# Patient Record
Sex: MALE | Race: OTHER | HISPANIC OR LATINO | Employment: PART TIME | URBAN - METROPOLITAN AREA
[De-identification: names, ages, dates, MRNs, and addresses within clinical notes are randomized per-mention and may not be internally consistent; named-entity substitution may affect disease eponyms.]

---

## 2020-11-01 ENCOUNTER — HOSPITAL ENCOUNTER (OUTPATIENT)
Facility: HOSPITAL | Age: 65
Setting detail: OBSERVATION
Discharge: HOME | End: 2020-11-02
Attending: EMERGENCY MEDICINE | Admitting: INTERNAL MEDICINE

## 2020-11-01 ENCOUNTER — APPOINTMENT (EMERGENCY)
Dept: RADIOLOGY | Facility: HOSPITAL | Age: 65
End: 2020-11-01

## 2020-11-01 DIAGNOSIS — R42 DIZZINESS: ICD-10-CM

## 2020-11-01 DIAGNOSIS — E78.5 HYPERLIPIDEMIA: ICD-10-CM

## 2020-11-01 DIAGNOSIS — I63.9 CVA (CEREBRAL VASCULAR ACCIDENT) (HCC): Primary | ICD-10-CM

## 2020-11-01 PROBLEM — H93.19 TINNITUS: Status: ACTIVE | Noted: 2020-11-01

## 2020-11-01 PROBLEM — J32.1 SINUSITIS CHRONIC, FRONTAL: Status: ACTIVE | Noted: 2020-11-01

## 2020-11-01 PROBLEM — G44.009 HEADACHES, CLUSTER: Status: ACTIVE | Noted: 2020-11-01

## 2020-11-01 LAB
ALBUMIN SERPL BCP-MCNC: 3.4 G/DL (ref 3.5–5)
ALP SERPL-CCNC: 93 U/L (ref 46–116)
ALT SERPL W P-5'-P-CCNC: 25 U/L (ref 12–78)
ANION GAP SERPL CALCULATED.3IONS-SCNC: 6 MMOL/L (ref 4–13)
APTT PPP: 30 SECONDS (ref 23–37)
AST SERPL W P-5'-P-CCNC: 14 U/L (ref 5–45)
BASOPHILS # BLD AUTO: 0.04 THOUSANDS/ΜL (ref 0–0.1)
BASOPHILS NFR BLD AUTO: 1 % (ref 0–1)
BILIRUB SERPL-MCNC: 0.4 MG/DL (ref 0.2–1)
BUN SERPL-MCNC: 19 MG/DL (ref 5–25)
CALCIUM ALBUM COR SERPL-MCNC: 9.2 MG/DL (ref 8.3–10.1)
CALCIUM SERPL-MCNC: 8.7 MG/DL (ref 8.3–10.1)
CHLORIDE SERPL-SCNC: 103 MMOL/L (ref 100–108)
CO2 SERPL-SCNC: 30 MMOL/L (ref 21–32)
CREAT SERPL-MCNC: 1.27 MG/DL (ref 0.6–1.3)
EOSINOPHIL # BLD AUTO: 0.13 THOUSAND/ΜL (ref 0–0.61)
EOSINOPHIL NFR BLD AUTO: 2 % (ref 0–6)
ERYTHROCYTE [DISTWIDTH] IN BLOOD BY AUTOMATED COUNT: 12.5 % (ref 11.6–15.1)
GFR SERPL CREATININE-BSD FRML MDRD: 59 ML/MIN/1.73SQ M
GLUCOSE SERPL-MCNC: 89 MG/DL (ref 65–140)
HCT VFR BLD AUTO: 43.4 % (ref 36.5–49.3)
HGB BLD-MCNC: 14.1 G/DL (ref 12–17)
IMM GRANULOCYTES # BLD AUTO: 0.11 THOUSAND/UL (ref 0–0.2)
IMM GRANULOCYTES NFR BLD AUTO: 2 % (ref 0–2)
INR PPP: 1.1 (ref 0.84–1.19)
LYMPHOCYTES # BLD AUTO: 1.08 THOUSANDS/ΜL (ref 0.6–4.47)
LYMPHOCYTES NFR BLD AUTO: 16 % (ref 14–44)
MCH RBC QN AUTO: 29.4 PG (ref 26.8–34.3)
MCHC RBC AUTO-ENTMCNC: 32.5 G/DL (ref 31.4–37.4)
MCV RBC AUTO: 91 FL (ref 82–98)
MONOCYTES # BLD AUTO: 0.53 THOUSAND/ΜL (ref 0.17–1.22)
MONOCYTES NFR BLD AUTO: 8 % (ref 4–12)
NEUTROPHILS # BLD AUTO: 4.75 THOUSANDS/ΜL (ref 1.85–7.62)
NEUTS SEG NFR BLD AUTO: 71 % (ref 43–75)
NRBC BLD AUTO-RTO: 0 /100 WBCS
NT-PROBNP SERPL-MCNC: 28 PG/ML
PLATELET # BLD AUTO: 204 THOUSANDS/UL (ref 149–390)
PMV BLD AUTO: 9.2 FL (ref 8.9–12.7)
POTASSIUM SERPL-SCNC: 4.2 MMOL/L (ref 3.5–5.3)
PROT SERPL-MCNC: 6.8 G/DL (ref 6.4–8.2)
PROTHROMBIN TIME: 14.1 SECONDS (ref 11.6–14.5)
RBC # BLD AUTO: 4.79 MILLION/UL (ref 3.88–5.62)
SODIUM SERPL-SCNC: 139 MMOL/L (ref 136–145)
TROPONIN I SERPL-MCNC: <0.02 NG/ML
TSH SERPL DL<=0.05 MIU/L-ACNC: 1.69 UIU/ML (ref 0.36–3.74)
WBC # BLD AUTO: 6.64 THOUSAND/UL (ref 4.31–10.16)

## 2020-11-01 PROCEDURE — 84484 ASSAY OF TROPONIN QUANT: CPT | Performed by: PHYSICIAN ASSISTANT

## 2020-11-01 PROCEDURE — 70496 CT ANGIOGRAPHY HEAD: CPT

## 2020-11-01 PROCEDURE — 85025 COMPLETE CBC W/AUTO DIFF WBC: CPT | Performed by: PHYSICIAN ASSISTANT

## 2020-11-01 PROCEDURE — 85730 THROMBOPLASTIN TIME PARTIAL: CPT | Performed by: PHYSICIAN ASSISTANT

## 2020-11-01 PROCEDURE — 84443 ASSAY THYROID STIM HORMONE: CPT | Performed by: PHYSICIAN ASSISTANT

## 2020-11-01 PROCEDURE — 80053 COMPREHEN METABOLIC PANEL: CPT | Performed by: PHYSICIAN ASSISTANT

## 2020-11-01 PROCEDURE — 71045 X-RAY EXAM CHEST 1 VIEW: CPT

## 2020-11-01 PROCEDURE — 85610 PROTHROMBIN TIME: CPT | Performed by: PHYSICIAN ASSISTANT

## 2020-11-01 PROCEDURE — 70498 CT ANGIOGRAPHY NECK: CPT

## 2020-11-01 PROCEDURE — 99220 PR INITIAL OBSERVATION CARE/DAY 70 MINUTES: CPT | Performed by: INTERNAL MEDICINE

## 2020-11-01 PROCEDURE — 99285 EMERGENCY DEPT VISIT HI MDM: CPT

## 2020-11-01 PROCEDURE — 99285 EMERGENCY DEPT VISIT HI MDM: CPT | Performed by: PHYSICIAN ASSISTANT

## 2020-11-01 PROCEDURE — 93005 ELECTROCARDIOGRAM TRACING: CPT

## 2020-11-01 PROCEDURE — 83880 ASSAY OF NATRIURETIC PEPTIDE: CPT | Performed by: PHYSICIAN ASSISTANT

## 2020-11-01 PROCEDURE — 36415 COLL VENOUS BLD VENIPUNCTURE: CPT | Performed by: PHYSICIAN ASSISTANT

## 2020-11-01 PROCEDURE — G1004 CDSM NDSC: HCPCS

## 2020-11-01 RX ORDER — ATORVASTATIN CALCIUM 40 MG/1
40 TABLET, FILM COATED ORAL EVERY EVENING
Status: DISCONTINUED | OUTPATIENT
Start: 2020-11-01 | End: 2020-11-02 | Stop reason: HOSPADM

## 2020-11-01 RX ORDER — ASPIRIN 81 MG/1
81 TABLET, CHEWABLE ORAL DAILY
Status: DISCONTINUED | OUTPATIENT
Start: 2020-11-01 | End: 2020-11-02 | Stop reason: HOSPADM

## 2020-11-01 RX ORDER — MECLIZINE HYDROCHLORIDE 25 MG/1
25 TABLET ORAL ONCE
Status: COMPLETED | OUTPATIENT
Start: 2020-11-01 | End: 2020-11-01

## 2020-11-01 RX ORDER — LORATADINE 10 MG/1
10 TABLET ORAL DAILY
Status: DISCONTINUED | OUTPATIENT
Start: 2020-11-01 | End: 2020-11-02 | Stop reason: HOSPADM

## 2020-11-01 RX ORDER — HEPARIN SODIUM 5000 [USP'U]/ML
5000 INJECTION, SOLUTION INTRAVENOUS; SUBCUTANEOUS EVERY 8 HOURS SCHEDULED
Status: DISCONTINUED | OUTPATIENT
Start: 2020-11-01 | End: 2020-11-02 | Stop reason: HOSPADM

## 2020-11-01 RX ORDER — FLUTICASONE PROPIONATE 50 MCG
1 SPRAY, SUSPENSION (ML) NASAL AS NEEDED
COMMUNITY

## 2020-11-01 RX ORDER — FLUTICASONE PROPIONATE 50 MCG
1 SPRAY, SUSPENSION (ML) NASAL DAILY
Status: DISCONTINUED | OUTPATIENT
Start: 2020-11-01 | End: 2020-11-02 | Stop reason: HOSPADM

## 2020-11-01 RX ORDER — ASPIRIN 81 MG/1
162 TABLET, CHEWABLE ORAL ONCE
Status: COMPLETED | OUTPATIENT
Start: 2020-11-01 | End: 2020-11-01

## 2020-11-01 RX ORDER — LORATADINE 10 MG/1
10 TABLET ORAL DAILY PRN
COMMUNITY

## 2020-11-01 RX ADMIN — PREDNISONE 50 MG: 20 TABLET ORAL at 10:55

## 2020-11-01 RX ADMIN — MECLIZINE HYDROCHLORIDE 25 MG: 25 TABLET ORAL at 10:54

## 2020-11-01 RX ADMIN — HEPARIN SODIUM 5000 UNITS: 5000 INJECTION INTRAVENOUS; SUBCUTANEOUS at 21:05

## 2020-11-01 RX ADMIN — ASPIRIN 81 MG 81 MG: 81 TABLET ORAL at 15:18

## 2020-11-01 RX ADMIN — IOHEXOL 85 ML: 350 INJECTION, SOLUTION INTRAVENOUS at 11:34

## 2020-11-01 RX ADMIN — HEPARIN SODIUM 5000 UNITS: 5000 INJECTION INTRAVENOUS; SUBCUTANEOUS at 15:17

## 2020-11-01 RX ADMIN — ATORVASTATIN CALCIUM 40 MG: 40 TABLET, FILM COATED ORAL at 17:29

## 2020-11-01 RX ADMIN — ASPIRIN 81 MG CHEWABLE TABLET 162 MG: 81 TABLET CHEWABLE at 12:22

## 2020-11-02 ENCOUNTER — APPOINTMENT (OUTPATIENT)
Dept: RADIOLOGY | Facility: HOSPITAL | Age: 65
End: 2020-11-02

## 2020-11-02 ENCOUNTER — APPOINTMENT (OUTPATIENT)
Dept: NON INVASIVE DIAGNOSTICS | Facility: HOSPITAL | Age: 65
End: 2020-11-02

## 2020-11-02 VITALS
DIASTOLIC BLOOD PRESSURE: 74 MMHG | WEIGHT: 196.87 LBS | HEART RATE: 63 BPM | BODY MASS INDEX: 27.56 KG/M2 | HEIGHT: 71 IN | SYSTOLIC BLOOD PRESSURE: 126 MMHG | TEMPERATURE: 98.3 F | OXYGEN SATURATION: 100 % | RESPIRATION RATE: 17 BRPM

## 2020-11-02 LAB
ALBUMIN SERPL BCP-MCNC: 3.1 G/DL (ref 3.5–5)
ALP SERPL-CCNC: 86 U/L (ref 46–116)
ALT SERPL W P-5'-P-CCNC: 20 U/L (ref 12–78)
ANION GAP SERPL CALCULATED.3IONS-SCNC: 8 MMOL/L (ref 4–13)
AST SERPL W P-5'-P-CCNC: 13 U/L (ref 5–45)
ATRIAL RATE: 61 BPM
BILIRUB SERPL-MCNC: 0.3 MG/DL (ref 0.2–1)
BUN SERPL-MCNC: 15 MG/DL (ref 5–25)
CALCIUM ALBUM COR SERPL-MCNC: 9.3 MG/DL (ref 8.3–10.1)
CALCIUM SERPL-MCNC: 8.6 MG/DL (ref 8.3–10.1)
CHLORIDE SERPL-SCNC: 105 MMOL/L (ref 100–108)
CHOLEST SERPL-MCNC: 196 MG/DL (ref 50–200)
CO2 SERPL-SCNC: 28 MMOL/L (ref 21–32)
CREAT SERPL-MCNC: 1.2 MG/DL (ref 0.6–1.3)
ERYTHROCYTE [DISTWIDTH] IN BLOOD BY AUTOMATED COUNT: 12.5 % (ref 11.6–15.1)
EST. AVERAGE GLUCOSE BLD GHB EST-MCNC: 114 MG/DL
GFR SERPL CREATININE-BSD FRML MDRD: 64 ML/MIN/1.73SQ M
GLUCOSE P FAST SERPL-MCNC: 101 MG/DL (ref 65–99)
GLUCOSE SERPL-MCNC: 101 MG/DL (ref 65–140)
HBA1C MFR BLD: 5.6 %
HCT VFR BLD AUTO: 43.1 % (ref 36.5–49.3)
HDLC SERPL-MCNC: 67 MG/DL
HGB BLD-MCNC: 13.8 G/DL (ref 12–17)
LDLC SERPL CALC-MCNC: 112 MG/DL (ref 0–100)
MCH RBC QN AUTO: 29.1 PG (ref 26.8–34.3)
MCHC RBC AUTO-ENTMCNC: 32 G/DL (ref 31.4–37.4)
MCV RBC AUTO: 91 FL (ref 82–98)
P AXIS: 53 DEGREES
PLATELET # BLD AUTO: 193 THOUSANDS/UL (ref 149–390)
PMV BLD AUTO: 9.8 FL (ref 8.9–12.7)
POTASSIUM SERPL-SCNC: 3.7 MMOL/L (ref 3.5–5.3)
PR INTERVAL: 164 MS
PROT SERPL-MCNC: 6.6 G/DL (ref 6.4–8.2)
QRS AXIS: 76 DEGREES
QRSD INTERVAL: 84 MS
QT INTERVAL: 376 MS
QTC INTERVAL: 378 MS
RBC # BLD AUTO: 4.75 MILLION/UL (ref 3.88–5.62)
SODIUM SERPL-SCNC: 141 MMOL/L (ref 136–145)
T WAVE AXIS: 42 DEGREES
TRIGL SERPL-MCNC: 87 MG/DL
VENTRICULAR RATE: 61 BPM
WBC # BLD AUTO: 9.14 THOUSAND/UL (ref 4.31–10.16)

## 2020-11-02 PROCEDURE — 85027 COMPLETE CBC AUTOMATED: CPT | Performed by: INTERNAL MEDICINE

## 2020-11-02 PROCEDURE — 93306 TTE W/DOPPLER COMPLETE: CPT

## 2020-11-02 PROCEDURE — 83036 HEMOGLOBIN GLYCOSYLATED A1C: CPT | Performed by: INTERNAL MEDICINE

## 2020-11-02 PROCEDURE — 70551 MRI BRAIN STEM W/O DYE: CPT

## 2020-11-02 PROCEDURE — 80061 LIPID PANEL: CPT | Performed by: INTERNAL MEDICINE

## 2020-11-02 PROCEDURE — 93306 TTE W/DOPPLER COMPLETE: CPT | Performed by: INTERNAL MEDICINE

## 2020-11-02 PROCEDURE — 97161 PT EVAL LOW COMPLEX 20 MIN: CPT

## 2020-11-02 PROCEDURE — 80053 COMPREHEN METABOLIC PANEL: CPT | Performed by: INTERNAL MEDICINE

## 2020-11-02 PROCEDURE — 99217 PR OBSERVATION CARE DISCHARGE MANAGEMENT: CPT | Performed by: NURSE PRACTITIONER

## 2020-11-02 PROCEDURE — 92523 SPEECH SOUND LANG COMPREHEN: CPT

## 2020-11-02 PROCEDURE — 99244 OFF/OP CNSLTJ NEW/EST MOD 40: CPT | Performed by: NURSE PRACTITIONER

## 2020-11-02 PROCEDURE — 97165 OT EVAL LOW COMPLEX 30 MIN: CPT

## 2020-11-02 PROCEDURE — 93010 ELECTROCARDIOGRAM REPORT: CPT | Performed by: INTERNAL MEDICINE

## 2020-11-02 PROCEDURE — G1004 CDSM NDSC: HCPCS

## 2020-11-02 RX ORDER — ATORVASTATIN CALCIUM 20 MG/1
20 TABLET, FILM COATED ORAL
Qty: 30 TABLET | Refills: 0 | Status: SHIPPED | OUTPATIENT
Start: 2020-11-02 | End: 2021-06-23

## 2020-11-02 RX ORDER — ATORVASTATIN CALCIUM 40 MG/1
20 TABLET, FILM COATED ORAL
Qty: 15 TABLET | Refills: 0 | Status: SHIPPED | OUTPATIENT
Start: 2020-11-02 | End: 2020-11-02 | Stop reason: HOSPADM

## 2020-11-02 RX ADMIN — ATORVASTATIN CALCIUM 40 MG: 40 TABLET, FILM COATED ORAL at 17:56

## 2020-11-02 RX ADMIN — HEPARIN SODIUM 5000 UNITS: 5000 INJECTION INTRAVENOUS; SUBCUTANEOUS at 15:11

## 2020-11-02 RX ADMIN — HEPARIN SODIUM 5000 UNITS: 5000 INJECTION INTRAVENOUS; SUBCUTANEOUS at 05:28

## 2020-11-02 RX ADMIN — ASPIRIN 81 MG 81 MG: 81 TABLET ORAL at 08:08

## 2020-11-02 RX ADMIN — LORATADINE 10 MG: 10 TABLET ORAL at 08:08

## 2020-11-02 RX ADMIN — FLUTICASONE PROPIONATE 1 SPRAY: 50 SPRAY, METERED NASAL at 08:08

## 2020-11-06 ENCOUNTER — TELEPHONE (OUTPATIENT)
Dept: NEUROLOGY | Facility: CLINIC | Age: 65
End: 2020-11-06

## 2021-03-15 DIAGNOSIS — K21.00 GASTROESOPHAGEAL REFLUX DISEASE WITH ESOPHAGITIS WITHOUT HEMORRHAGE: Primary | ICD-10-CM

## 2021-03-17 RX ORDER — OMEPRAZOLE 40 MG/1
CAPSULE, DELAYED RELEASE ORAL
Qty: 30 CAPSULE | Refills: 2 | Status: SHIPPED | OUTPATIENT
Start: 2021-03-17 | End: 2021-09-24

## 2021-03-18 NOTE — TELEPHONE ENCOUNTER
I lmom for pt to please call back to schedule an appt with Dr Schroeder Clarity  Will call pt again in one week if do not hear back from him

## 2021-03-26 NOTE — TELEPHONE ENCOUNTER
I lmom for pt to please call back to schedule an appt with Dr Susan Lockhart for a f/u to med refill  Will call pt again in two weeks if do not hear back from him

## 2021-03-31 ENCOUNTER — OFFICE VISIT (OUTPATIENT)
Dept: GASTROENTEROLOGY | Facility: CLINIC | Age: 66
End: 2021-03-31
Payer: COMMERCIAL

## 2021-03-31 VITALS
SYSTOLIC BLOOD PRESSURE: 122 MMHG | HEIGHT: 71 IN | DIASTOLIC BLOOD PRESSURE: 82 MMHG | WEIGHT: 200 LBS | BODY MASS INDEX: 28 KG/M2 | HEART RATE: 89 BPM

## 2021-03-31 DIAGNOSIS — E66.3 OVERWEIGHT: ICD-10-CM

## 2021-03-31 DIAGNOSIS — Z86.010 HX OF ADENOMATOUS COLONIC POLYPS: ICD-10-CM

## 2021-03-31 DIAGNOSIS — K21.00 GASTROESOPHAGEAL REFLUX DISEASE WITH ESOPHAGITIS WITHOUT HEMORRHAGE: Primary | ICD-10-CM

## 2021-03-31 PROBLEM — I63.81 LACUNAR INFARCT, ACUTE (HCC): Status: ACTIVE | Noted: 2021-03-31

## 2021-03-31 PROBLEM — Z86.0101 HX OF ADENOMATOUS COLONIC POLYPS: Status: ACTIVE | Noted: 2021-03-31

## 2021-03-31 PROCEDURE — 99214 OFFICE O/P EST MOD 30 MIN: CPT | Performed by: INTERNAL MEDICINE

## 2021-03-31 NOTE — PROGRESS NOTES
[facility] - Outpatient Follow-up Note  David Willard 73 y o  male MRN: [id number]  Encounter: [id number]          ASSESSMENT AND PLAN:      1  Gastroesophageal reflux disease with esophagitis without hemorrhage    Intermittent episodes controlled with as needed omeprazole  He will work on conservative measures and weight loss  He has not had a EGD for over 5 years  - EGD; Future  - PAT Covid Screening; Future    2  Hx of adenomatous colonic polyps    Was due for repeat colonoscopy for tubular adenomas and tubulovillous adenomas in March of 2019 but lost his insurance he now presents to schedule colonoscopy  - Colonoscopy; Future    3  Overweight    Gained weight   After poor dietary choices  He is working on better control ie  portion control and carbohydrate avoidance     ______________________________________________________________________    SUBJECTIVE:   Very pleasant 81-year-old gentleman who we seen over 3 years ago for EGD and colonoscopy  He has a gastroesophageal reflux without Mercado's  He was found to have colon polyps both tubular adenomas and tubulovillous adenomas  He was due for repeat in 2019 but lost his insurance  He now presents  No significant upper lower GI symptoms at present  Occasionally has some bloating  His reflux she controls with intermittent use of a PPI  He has gained weight during COVID  REVIEW OF SYSTEMS IS OTHERWISE NEGATIVE  Historical Information   History reviewed  No pertinent past medical history  Past Surgical History:   Procedure Laterality Date    NO PAST SURGERIES      NC COLONOSCOPY FLX DX W/COLLJ SPEC WHEN PFRMD N/A 2/29/2016    Procedure: COLONOSCOPY;  Surgeon: Ish Philippe MD;  Location: [facility] GI LAB; Service: Gastroenterology    NC EGD TRANSORAL BIOPSY SINGLE/MULTIPLE N/A 2/29/2016    Procedure: ESOPHAGOGASTRODUODENOSCOPY (EGD); Surgeon: Ish Philippe MD;  Location: [facility] GI LAB;   Service: Gastroenterology  TONSILLECTOMY N/A     child     Social History   Social History     Substance and Sexual Activity   Alcohol Use Yes    Frequency: 4 or more times a week    Drinks per session: 3 or 4    Binge frequency: Never     Social History     Substance and Sexual Activity   Drug Use No     Social History     Tobacco Use   Smoking Status Former Smoker    Quit date: 2001    Years since quittin 0   Smokeless Tobacco Never Used     History reviewed  No pertinent family history  Meds/Allergies       Current Outpatient Medications:     fluticasone (FLONASE) 50 mcg/act nasal spray    loratadine (CLARITIN) 10 mg tablet    omeprazole (PriLOSEC) 40 MG capsule    atorvastatin (LIPITOR) 20 mg tablet    Allergies   Allergen Reactions    Penicillins Other (See Comments)     dizziness           Objective     Blood pressure 122/82, pulse 89, height 5' 11" (1 803 m), weight 90 7 kg (200 lb)  Body mass index is 27 89 kg/m²  PHYSICAL EXAM:      General Appearance:   Alert, cooperative, no distress   HEENT:   Normocephalic, atraumatic, anicteric      Neck:  Supple, symmetrical, trachea midline   Lungs:   Clear to auscultation bilaterally; no rales, rhonchi or wheezing; respirations unlabored    Heart[de-identified]   Regular rate and rhythm; no murmur, rub, or gallop  Abdomen:   Soft, non-tender, non-distended; normal bowel sounds; no masses, no organomegaly    Genitalia:   Deferred    Rectal:   Deferred    Extremities:  No cyanosis, clubbing or edema    Pulses:  2+ and symmetric    Skin:  No jaundice, rashes, or lesions    Lymph nodes:  No palpable cervical lymphadenopathy        Lab Results:   No visits with results within 1 Day(s) from this visit     Latest known visit with results is:   Admission on 2020, Discharged on 2020   Component Date Value    WBC 2020 6 64     RBC 2020 4 79     Hemoglobin 2020 14 1     Hematocrit 2020 43 4     MCV 2020 91     MCH 2020 29 4     MCHC 11/01/2020 32 5     RDW 11/01/2020 12 5     MPV 11/01/2020 9 2     Platelets 86/33/8020 204     nRBC 11/01/2020 0     Neutrophils Relative 11/01/2020 71     Immat GRANS % 11/01/2020 2     Lymphocytes Relative 11/01/2020 16     Monocytes Relative 11/01/2020 8     Eosinophils Relative 11/01/2020 2     Basophils Relative 11/01/2020 1     Neutrophils Absolute 11/01/2020 4 75     Immature Grans Absolute 11/01/2020 0 11     Lymphocytes Absolute 11/01/2020 1 08     Monocytes Absolute 11/01/2020 0 53     Eosinophils Absolute 11/01/2020 0 13     Basophils Absolute 11/01/2020 0 04     Protime 11/01/2020 14 1     INR 11/01/2020 1 10     PTT 11/01/2020 30     Sodium 11/01/2020 139     Potassium 11/01/2020 4 2     Chloride 11/01/2020 103     CO2 11/01/2020 30     ANION GAP 11/01/2020 6     BUN 11/01/2020 19     Creatinine 11/01/2020 1 27     Glucose 11/01/2020 89     Calcium 11/01/2020 8 7     Corrected Calcium 11/01/2020 9 2     AST 11/01/2020 14     ALT 11/01/2020 25     Alkaline Phosphatase 11/01/2020 93     Total Protein 11/01/2020 6 8     Albumin 11/01/2020 3 4*    Total Bilirubin 11/01/2020 0 40     eGFR 11/01/2020 59     Troponin I 11/01/2020 <0 02     NT-proBNP 11/01/2020 28     TSH 3RD GENERATON 11/01/2020 1 691     Cholesterol 11/02/2020 196     Triglycerides 11/02/2020 87     HDL, Direct 11/02/2020 67     LDL Calculated 11/02/2020 112*    Hemoglobin A1C 11/02/2020 5 6     EAG 11/02/2020 114     Sodium 11/02/2020 141     Potassium 11/02/2020 3 7     Chloride 11/02/2020 105     CO2 11/02/2020 28     ANION GAP 11/02/2020 8     BUN 11/02/2020 15     Creatinine 11/02/2020 1 20     Glucose 11/02/2020 101     Glucose, Fasting 11/02/2020 101*    Calcium 11/02/2020 8 6     Corrected Calcium 11/02/2020 9 3     AST 11/02/2020 13     ALT 11/02/2020 20     Alkaline Phosphatase 11/02/2020 86     Total Protein 11/02/2020 6 6     Albumin 11/02/2020 3 1*    Total Bilirubin 11/02/2020 0 30     eGFR 11/02/2020 64     WBC 11/02/2020 9 14     RBC 11/02/2020 4 75     Hemoglobin 11/02/2020 13 8     Hematocrit 11/02/2020 43 1     MCV 11/02/2020 91     MCH 11/02/2020 29 1     MCHC 11/02/2020 32 0     RDW 11/02/2020 12 5     Platelets 67/64/5974 193     MPV 11/02/2020 9 8     Ventricular Rate 11/01/2020 61     Atrial Rate 11/01/2020 61     OR Interval 11/01/2020 164     QRSD Interval 11/01/2020 84     QT Interval 11/01/2020 376     QTC Interval 11/01/2020 378     P Axis 11/01/2020 53     QRS Axis 11/01/2020 76     T Wave Axis 11/01/2020 42          Radiology Results:   No results found

## 2021-03-31 NOTE — H&P (VIEW-ONLY)
[facility] - Outpatient Follow-up Note  Dione Nayak 73 y o  male MRN: [id number]  Encounter: [id number]          ASSESSMENT AND PLAN:      1  Gastroesophageal reflux disease with esophagitis without hemorrhage    Intermittent episodes controlled with as needed omeprazole  He will work on conservative measures and weight loss  He has not had a EGD for over 5 years  - EGD; Future  - PAT Covid Screening; Future    2  Hx of adenomatous colonic polyps    Was due for repeat colonoscopy for tubular adenomas and tubulovillous adenomas in March of 2019 but lost his insurance he now presents to schedule colonoscopy  - Colonoscopy; Future    3  Overweight    Gained weight   After poor dietary choices  He is working on better control ie  portion control and carbohydrate avoidance     ______________________________________________________________________    SUBJECTIVE:   Very pleasant 52-year-old gentleman who we seen over 3 years ago for EGD and colonoscopy  He has a gastroesophageal reflux without Mercado's  He was found to have colon polyps both tubular adenomas and tubulovillous adenomas  He was due for repeat in 2019 but lost his insurance  He now presents  No significant upper lower GI symptoms at present  Occasionally has some bloating  His reflux she controls with intermittent use of a PPI  He has gained weight during COVID  REVIEW OF SYSTEMS IS OTHERWISE NEGATIVE  Historical Information   History reviewed  No pertinent past medical history  Past Surgical History:   Procedure Laterality Date    NO PAST SURGERIES      VT COLONOSCOPY FLX DX W/COLLJ SPEC WHEN PFRMD N/A 2/29/2016    Procedure: COLONOSCOPY;  Surgeon: Berto Cho MD;  Location: [facility] GI LAB; Service: Gastroenterology    VT EGD TRANSORAL BIOPSY SINGLE/MULTIPLE N/A 2/29/2016    Procedure: ESOPHAGOGASTRODUODENOSCOPY (EGD); Surgeon: Berto Cho MD;  Location: [facility] GI LAB;   Service: Gastroenterology  TONSILLECTOMY N/A     child     Social History   Social History     Substance and Sexual Activity   Alcohol Use Yes    Frequency: 4 or more times a week    Drinks per session: 3 or 4    Binge frequency: Never     Social History     Substance and Sexual Activity   Drug Use No     Social History     Tobacco Use   Smoking Status Former Smoker    Quit date: 2001    Years since quittin 0   Smokeless Tobacco Never Used     History reviewed  No pertinent family history  Meds/Allergies       Current Outpatient Medications:     fluticasone (FLONASE) 50 mcg/act nasal spray    loratadine (CLARITIN) 10 mg tablet    omeprazole (PriLOSEC) 40 MG capsule    atorvastatin (LIPITOR) 20 mg tablet    Allergies   Allergen Reactions    Penicillins Other (See Comments)     dizziness           Objective     Blood pressure 122/82, pulse 89, height 5' 11" (1 803 m), weight 90 7 kg (200 lb)  Body mass index is 27 89 kg/m²  PHYSICAL EXAM:      General Appearance:   Alert, cooperative, no distress   HEENT:   Normocephalic, atraumatic, anicteric      Neck:  Supple, symmetrical, trachea midline   Lungs:   Clear to auscultation bilaterally; no rales, rhonchi or wheezing; respirations unlabored    Heart[de-identified]   Regular rate and rhythm; no murmur, rub, or gallop  Abdomen:   Soft, non-tender, non-distended; normal bowel sounds; no masses, no organomegaly    Genitalia:   Deferred    Rectal:   Deferred    Extremities:  No cyanosis, clubbing or edema    Pulses:  2+ and symmetric    Skin:  No jaundice, rashes, or lesions    Lymph nodes:  No palpable cervical lymphadenopathy        Lab Results:   No visits with results within 1 Day(s) from this visit     Latest known visit with results is:   Admission on 2020, Discharged on 2020   Component Date Value    WBC 2020 6 64     RBC 2020 4 79     Hemoglobin 2020 14 1     Hematocrit 2020 43 4     MCV 2020 91     MCH 2020 29 4     MCHC 11/01/2020 32 5     RDW 11/01/2020 12 5     MPV 11/01/2020 9 2     Platelets 46/38/3255 204     nRBC 11/01/2020 0     Neutrophils Relative 11/01/2020 71     Immat GRANS % 11/01/2020 2     Lymphocytes Relative 11/01/2020 16     Monocytes Relative 11/01/2020 8     Eosinophils Relative 11/01/2020 2     Basophils Relative 11/01/2020 1     Neutrophils Absolute 11/01/2020 4 75     Immature Grans Absolute 11/01/2020 0 11     Lymphocytes Absolute 11/01/2020 1 08     Monocytes Absolute 11/01/2020 0 53     Eosinophils Absolute 11/01/2020 0 13     Basophils Absolute 11/01/2020 0 04     Protime 11/01/2020 14 1     INR 11/01/2020 1 10     PTT 11/01/2020 30     Sodium 11/01/2020 139     Potassium 11/01/2020 4 2     Chloride 11/01/2020 103     CO2 11/01/2020 30     ANION GAP 11/01/2020 6     BUN 11/01/2020 19     Creatinine 11/01/2020 1 27     Glucose 11/01/2020 89     Calcium 11/01/2020 8 7     Corrected Calcium 11/01/2020 9 2     AST 11/01/2020 14     ALT 11/01/2020 25     Alkaline Phosphatase 11/01/2020 93     Total Protein 11/01/2020 6 8     Albumin 11/01/2020 3 4*    Total Bilirubin 11/01/2020 0 40     eGFR 11/01/2020 59     Troponin I 11/01/2020 <0 02     NT-proBNP 11/01/2020 28     TSH 3RD GENERATON 11/01/2020 1 691     Cholesterol 11/02/2020 196     Triglycerides 11/02/2020 87     HDL, Direct 11/02/2020 67     LDL Calculated 11/02/2020 112*    Hemoglobin A1C 11/02/2020 5 6     EAG 11/02/2020 114     Sodium 11/02/2020 141     Potassium 11/02/2020 3 7     Chloride 11/02/2020 105     CO2 11/02/2020 28     ANION GAP 11/02/2020 8     BUN 11/02/2020 15     Creatinine 11/02/2020 1 20     Glucose 11/02/2020 101     Glucose, Fasting 11/02/2020 101*    Calcium 11/02/2020 8 6     Corrected Calcium 11/02/2020 9 3     AST 11/02/2020 13     ALT 11/02/2020 20     Alkaline Phosphatase 11/02/2020 86     Total Protein 11/02/2020 6 6     Albumin 11/02/2020 3 1*    Total Bilirubin 11/02/2020 0 30     eGFR 11/02/2020 64     WBC 11/02/2020 9 14     RBC 11/02/2020 4 75     Hemoglobin 11/02/2020 13 8     Hematocrit 11/02/2020 43 1     MCV 11/02/2020 91     MCH 11/02/2020 29 1     MCHC 11/02/2020 32 0     RDW 11/02/2020 12 5     Platelets 47/62/0976 193     MPV 11/02/2020 9 8     Ventricular Rate 11/01/2020 61     Atrial Rate 11/01/2020 61     GA Interval 11/01/2020 164     QRSD Interval 11/01/2020 84     QT Interval 11/01/2020 376     QTC Interval 11/01/2020 378     P Axis 11/01/2020 53     QRS Axis 11/01/2020 76     T Wave Axis 11/01/2020 42          Radiology Results:   No results found

## 2021-04-05 ENCOUNTER — TRANSCRIBE ORDERS (OUTPATIENT)
Dept: ADMINISTRATIVE | Facility: HOSPITAL | Age: 66
End: 2021-04-05

## 2021-04-05 DIAGNOSIS — I70.213 ATHEROSCLEROSIS OF NATIVE ARTERIES OF EXTREMITIES WITH INTERMITTENT CLAUDICATION, BILATERAL LEGS (HCC): Primary | ICD-10-CM

## 2021-04-15 DIAGNOSIS — K21.00 GASTROESOPHAGEAL REFLUX DISEASE WITH ESOPHAGITIS WITHOUT HEMORRHAGE: ICD-10-CM

## 2021-04-15 PROCEDURE — U0005 INFEC AGEN DETEC AMPLI PROBE: HCPCS | Performed by: INTERNAL MEDICINE

## 2021-04-15 PROCEDURE — U0003 INFECTIOUS AGENT DETECTION BY NUCLEIC ACID (DNA OR RNA); SEVERE ACUTE RESPIRATORY SYNDROME CORONAVIRUS 2 (SARS-COV-2) (CORONAVIRUS DISEASE [COVID-19]), AMPLIFIED PROBE TECHNIQUE, MAKING USE OF HIGH THROUGHPUT TECHNOLOGIES AS DESCRIBED BY CMS-2020-01-R: HCPCS | Performed by: INTERNAL MEDICINE

## 2021-04-16 ENCOUNTER — HOSPITAL ENCOUNTER (OUTPATIENT)
Dept: RADIOLOGY | Facility: HOSPITAL | Age: 66
Discharge: HOME | End: 2021-04-16
Attending: INTERNAL MEDICINE
Payer: COMMERCIAL

## 2021-04-16 DIAGNOSIS — I70.213 ATHEROSCLEROSIS OF NATIVE ARTERIES OF EXTREMITIES WITH INTERMITTENT CLAUDICATION, BILATERAL LEGS (HCC): ICD-10-CM

## 2021-04-16 LAB — SARS-COV-2 RNA RESP QL NAA+PROBE: NEGATIVE

## 2021-04-16 PROCEDURE — 93923 UPR/LXTR ART STDY 3+ LVLS: CPT

## 2021-04-16 PROCEDURE — 93922 UPR/L XTREMITY ART 2 LEVELS: CPT | Performed by: SURGERY

## 2021-04-16 PROCEDURE — 93925 LOWER EXTREMITY STUDY: CPT

## 2021-04-16 PROCEDURE — 93925 LOWER EXTREMITY STUDY: CPT | Performed by: SURGERY

## 2021-04-19 NOTE — PRE-PROCEDURE INSTRUCTIONS
Pre-Surgery Instructions:   Medication Instructions    atorvastatin (LIPITOR) 20 mg tablet Patient was instructed by Physician and understands   fluticasone (FLONASE) 50 mcg/act nasal spray Patient was instructed by Physician and understands   loratadine (CLARITIN) 10 mg tablet Patient was instructed by Physician and understands   omeprazole (PriLOSEC) 40 MG capsule Patient was instructed by Physician and understands

## 2021-04-20 ENCOUNTER — ANESTHESIA EVENT (OUTPATIENT)
Dept: GASTROENTEROLOGY | Facility: AMBULARY SURGERY CENTER | Age: 66
End: 2021-04-20

## 2021-04-21 ENCOUNTER — ANESTHESIA (OUTPATIENT)
Dept: GASTROENTEROLOGY | Facility: AMBULARY SURGERY CENTER | Age: 66
End: 2021-04-21

## 2021-04-21 ENCOUNTER — HOSPITAL ENCOUNTER (OUTPATIENT)
Dept: GASTROENTEROLOGY | Facility: AMBULARY SURGERY CENTER | Age: 66
Setting detail: OUTPATIENT SURGERY
Discharge: HOME | End: 2021-04-21
Attending: INTERNAL MEDICINE
Payer: COMMERCIAL

## 2021-04-21 VITALS
SYSTOLIC BLOOD PRESSURE: 118 MMHG | WEIGHT: 200 LBS | HEIGHT: 71 IN | HEART RATE: 54 BPM | RESPIRATION RATE: 18 BRPM | DIASTOLIC BLOOD PRESSURE: 70 MMHG | BODY MASS INDEX: 28 KG/M2 | OXYGEN SATURATION: 96 % | TEMPERATURE: 95.3 F

## 2021-04-21 DIAGNOSIS — Z86.010 HX OF ADENOMATOUS COLONIC POLYPS: ICD-10-CM

## 2021-04-21 DIAGNOSIS — K21.00 GASTROESOPHAGEAL REFLUX DISEASE WITH ESOPHAGITIS WITHOUT HEMORRHAGE: ICD-10-CM

## 2021-04-21 PROCEDURE — 88305 TISSUE EXAM BY PATHOLOGIST: CPT | Performed by: PATHOLOGY

## 2021-04-21 PROCEDURE — 45385 COLONOSCOPY W/LESION REMOVAL: CPT | Performed by: INTERNAL MEDICINE

## 2021-04-21 PROCEDURE — 43239 EGD BIOPSY SINGLE/MULTIPLE: CPT | Performed by: INTERNAL MEDICINE

## 2021-04-21 PROCEDURE — 88313 SPECIAL STAINS GROUP 2: CPT | Performed by: PATHOLOGY

## 2021-04-21 RX ORDER — PROPOFOL 10 MG/ML
INJECTION, EMULSION INTRAVENOUS AS NEEDED
Status: DISCONTINUED | OUTPATIENT
Start: 2021-04-21 | End: 2021-04-21

## 2021-04-21 RX ORDER — SODIUM CHLORIDE, SODIUM LACTATE, POTASSIUM CHLORIDE, CALCIUM CHLORIDE 600; 310; 30; 20 MG/100ML; MG/100ML; MG/100ML; MG/100ML
125 INJECTION, SOLUTION INTRAVENOUS CONTINUOUS
Status: DISCONTINUED | OUTPATIENT
Start: 2021-04-21 | End: 2021-04-25 | Stop reason: HOSPADM

## 2021-04-21 RX ADMIN — PROPOFOL 50 MG: 10 INJECTION, EMULSION INTRAVENOUS at 11:14

## 2021-04-21 RX ADMIN — PROPOFOL 50 MG: 10 INJECTION, EMULSION INTRAVENOUS at 11:12

## 2021-04-21 RX ADMIN — PROPOFOL 20 MG: 10 INJECTION, EMULSION INTRAVENOUS at 11:31

## 2021-04-21 RX ADMIN — PROPOFOL 30 MG: 10 INJECTION, EMULSION INTRAVENOUS at 11:28

## 2021-04-21 RX ADMIN — PROPOFOL 50 MG: 10 INJECTION, EMULSION INTRAVENOUS at 11:18

## 2021-04-21 RX ADMIN — PROPOFOL 50 MG: 10 INJECTION, EMULSION INTRAVENOUS at 11:15

## 2021-04-21 RX ADMIN — PROPOFOL 50 MG: 10 INJECTION, EMULSION INTRAVENOUS at 11:06

## 2021-04-21 RX ADMIN — SODIUM CHLORIDE, SODIUM LACTATE, POTASSIUM CHLORIDE, AND CALCIUM CHLORIDE 125 ML/HR: .6; .31; .03; .02 INJECTION, SOLUTION INTRAVENOUS at 10:34

## 2021-04-21 RX ADMIN — PROPOFOL 100 MG: 10 INJECTION, EMULSION INTRAVENOUS at 10:55

## 2021-04-21 RX ADMIN — LIDOCAINE HYDROCHLORIDE 100 MG: 20 INJECTION, SOLUTION INTRAVENOUS at 10:55

## 2021-04-21 NOTE — INTERVAL H&P NOTE
H&P reviewed  After examining the patient I find no changes in the patients condition since the H&P had been written      Vitals:    04/21/21 1023   BP: 104/57   Pulse: 57   Resp: 16   Temp: (!) 95 3 °F (35 2 °C)   SpO2: 97%

## 2021-04-21 NOTE — ANESTHESIA PREPROCEDURE EVALUATION
Procedure:  EGD  COLONOSCOPY    Relevant Problems   GI/HEPATIC   (+) Gastroesophageal reflux disease with esophagitis without hemorrhage      NEURO/PSYCH   (+) Hx of adenomatous colonic polyps        Physical Exam    Airway    Mallampati score: II  TM Distance: >3 FB  Neck ROM: full     Dental   No notable dental hx     Cardiovascular  Rhythm: regular, Rate: normal, Cardiovascular exam normal    Pulmonary  Pulmonary exam normal Breath sounds clear to auscultation,     Other Findings        Anesthesia Plan  ASA Score- 2     Anesthesia Type- IV sedation with anesthesia with ASA Monitors  Additional Monitors:   Airway Plan:           Plan Factors-Exercise tolerance (METS): >4 METS  Chart reviewed  Existing labs reviewed  Patient summary reviewed  Patient is not a current smoker  Induction- intravenous  Postoperative Plan-     Informed Consent- Anesthetic plan and risks discussed with patient  I personally reviewed this patient with the CRNA  Discussed and agreed on the Anesthesia Plan with the CRNA  Sae Ponce

## 2021-04-21 NOTE — ANESTHESIA POSTPROCEDURE EVALUATION
Post-Op Assessment Note    CV Status:  Stable       Mental Status:  Sleepy   Hydration Status:  Stable   PONV Controlled:  Controlled   Airway Patency:  Patent      Post Op Vitals Reviewed: Yes      Staff: CRNA         No complications documented      BP   115/69   Temp     Pulse  54   Resp   20   SpO2   98

## 2021-04-30 NOTE — RESULT ENCOUNTER NOTE
Please inform patient that there biopsies were benign    Schedule EGD for 2-3 months to follow-up ulcer, repeat colonoscopy 3 years

## 2021-05-03 ENCOUNTER — TELEPHONE (OUTPATIENT)
Dept: GASTROENTEROLOGY | Facility: CLINIC | Age: 66
End: 2021-05-03

## 2021-05-03 NOTE — TELEPHONE ENCOUNTER
LMOM for pt to call to schedule EGD with Dr Cheryl Bowie in 2-3 months  Hx of ulcer  Will try calling again in 1 week  If he calls back please get him scheduled @ [facility] [place]  I can offer him June 30 or July 1

## 2021-05-03 NOTE — TELEPHONE ENCOUNTER
----- Message from Angela Chakraborty LPN sent at 3/5/4046 10:03 AM EDT -----  Pt informed of results and recommendations for repeat EGD 2-3 months and colon 3 years  Recalls entered  Please call pt to schedule his repeat EGD

## 2021-05-05 ENCOUNTER — IMMUNIZATIONS (OUTPATIENT)
Dept: FAMILY MEDICINE CLINIC | Facility: HOSPITAL | Age: 66
End: 2021-05-05

## 2021-05-05 DIAGNOSIS — Z23 ENCOUNTER FOR IMMUNIZATION: Primary | ICD-10-CM

## 2021-05-05 PROCEDURE — 0011A SARS-COV-2 / COVID-19 MRNA VACCINE (MODERNA) 100 MCG: CPT

## 2021-05-05 PROCEDURE — 91301 SARS-COV-2 / COVID-19 MRNA VACCINE (MODERNA) 100 MCG: CPT

## 2021-06-07 ENCOUNTER — IMMUNIZATIONS (OUTPATIENT)
Dept: FAMILY MEDICINE CLINIC | Facility: HOSPITAL | Age: 66
End: 2021-06-07

## 2021-06-07 DIAGNOSIS — Z23 ENCOUNTER FOR IMMUNIZATION: Primary | ICD-10-CM

## 2021-06-07 PROCEDURE — 0012A SARS-COV-2 / COVID-19 MRNA VACCINE (MODERNA) 100 MCG: CPT

## 2021-06-07 PROCEDURE — 91301 SARS-COV-2 / COVID-19 MRNA VACCINE (MODERNA) 100 MCG: CPT

## 2021-06-22 ENCOUNTER — TELEPHONE (OUTPATIENT)
Dept: PREADMISSION TESTING | Facility: HOSPITAL | Age: 66
End: 2021-06-22

## 2021-06-23 VITALS — WEIGHT: 200 LBS | HEIGHT: 71 IN | BODY MASS INDEX: 28 KG/M2

## 2021-06-23 PROCEDURE — U0005 INFEC AGEN DETEC AMPLI PROBE: HCPCS | Performed by: INTERNAL MEDICINE

## 2021-06-23 PROCEDURE — U0003 INFECTIOUS AGENT DETECTION BY NUCLEIC ACID (DNA OR RNA); SEVERE ACUTE RESPIRATORY SYNDROME CORONAVIRUS 2 (SARS-COV-2) (CORONAVIRUS DISEASE [COVID-19]), AMPLIFIED PROBE TECHNIQUE, MAKING USE OF HIGH THROUGHPUT TECHNOLOGIES AS DESCRIBED BY CMS-2020-01-R: HCPCS | Performed by: INTERNAL MEDICINE

## 2021-06-23 NOTE — PRE-PROCEDURE INSTRUCTIONS
Pre-Surgery Instructions:   Medication Instructions    fluticasone (FLONASE) 50 mcg/act nasal spray Pt may use if needed    loratadine (CLARITIN) 10 mg tablet Instructed patient per Anesthesia Guidelines   omeprazole (PriLOSEC) 40 MG capsule Instructed patient per Anesthesia Guidelines  Pt to follow Dr Ej Dong instructions    yamilex Hunt [phone]

## 2021-06-29 ENCOUNTER — TELEPHONE (OUTPATIENT)
Dept: GASTROENTEROLOGY | Facility: AMBULARY SURGERY CENTER | Age: 66
End: 2021-06-29

## 2021-06-30 ENCOUNTER — HOSPITAL ENCOUNTER (OUTPATIENT)
Dept: GASTROENTEROLOGY | Facility: AMBULARY SURGERY CENTER | Age: 66
Setting detail: OUTPATIENT SURGERY
Discharge: HOME | End: 2021-06-30
Attending: INTERNAL MEDICINE | Admitting: INTERNAL MEDICINE
Payer: COMMERCIAL

## 2021-06-30 ENCOUNTER — ANESTHESIA (OUTPATIENT)
Dept: GASTROENTEROLOGY | Facility: AMBULARY SURGERY CENTER | Age: 66
End: 2021-06-30

## 2021-06-30 ENCOUNTER — ANESTHESIA EVENT (OUTPATIENT)
Dept: GASTROENTEROLOGY | Facility: AMBULARY SURGERY CENTER | Age: 66
End: 2021-06-30

## 2021-06-30 VITALS
RESPIRATION RATE: 18 BRPM | OXYGEN SATURATION: 98 % | BODY MASS INDEX: 27.89 KG/M2 | DIASTOLIC BLOOD PRESSURE: 64 MMHG | HEART RATE: 52 BPM | SYSTOLIC BLOOD PRESSURE: 114 MMHG | TEMPERATURE: 97.4 F | HEIGHT: 71 IN

## 2021-06-30 DIAGNOSIS — K25.9 GASTRIC ULCER, UNSPECIFIED CHRONICITY, UNSPECIFIED WHETHER GASTRIC ULCER HEMORRHAGE OR PERFORATION PRESENT: ICD-10-CM

## 2021-06-30 PROCEDURE — 43239 EGD BIOPSY SINGLE/MULTIPLE: CPT | Performed by: INTERNAL MEDICINE

## 2021-06-30 PROCEDURE — 88313 SPECIAL STAINS GROUP 2: CPT | Performed by: PATHOLOGY

## 2021-06-30 PROCEDURE — 88305 TISSUE EXAM BY PATHOLOGIST: CPT | Performed by: PATHOLOGY

## 2021-06-30 RX ORDER — PROPOFOL 10 MG/ML
INJECTION, EMULSION INTRAVENOUS AS NEEDED
Status: DISCONTINUED | OUTPATIENT
Start: 2021-06-30 | End: 2021-06-30

## 2021-06-30 RX ORDER — SODIUM CHLORIDE, SODIUM LACTATE, POTASSIUM CHLORIDE, CALCIUM CHLORIDE 600; 310; 30; 20 MG/100ML; MG/100ML; MG/100ML; MG/100ML
INJECTION, SOLUTION INTRAVENOUS CONTINUOUS PRN
Status: DISCONTINUED | OUTPATIENT
Start: 2021-06-30 | End: 2021-06-30

## 2021-06-30 RX ORDER — SODIUM CHLORIDE, SODIUM LACTATE, POTASSIUM CHLORIDE, CALCIUM CHLORIDE 600; 310; 30; 20 MG/100ML; MG/100ML; MG/100ML; MG/100ML
125 INJECTION, SOLUTION INTRAVENOUS CONTINUOUS
Status: DISCONTINUED | OUTPATIENT
Start: 2021-06-30 | End: 2021-07-04 | Stop reason: HOSPADM

## 2021-06-30 RX ORDER — LIDOCAINE HYDROCHLORIDE 10 MG/ML
INJECTION, SOLUTION EPIDURAL; INFILTRATION; INTRACAUDAL; PERINEURAL AS NEEDED
Status: DISCONTINUED | OUTPATIENT
Start: 2021-06-30 | End: 2021-06-30

## 2021-06-30 RX ADMIN — SODIUM CHLORIDE, SODIUM LACTATE, POTASSIUM CHLORIDE, AND CALCIUM CHLORIDE 125 ML/HR: .6; .31; .03; .02 INJECTION, SOLUTION INTRAVENOUS at 09:28

## 2021-06-30 RX ADMIN — PROPOFOL 130 MG: 10 INJECTION, EMULSION INTRAVENOUS at 09:38

## 2021-06-30 RX ADMIN — PROPOFOL 50 MG: 10 INJECTION, EMULSION INTRAVENOUS at 09:43

## 2021-06-30 RX ADMIN — PROPOFOL 50 MG: 10 INJECTION, EMULSION INTRAVENOUS at 09:41

## 2021-06-30 RX ADMIN — PROPOFOL 20 MG: 10 INJECTION, EMULSION INTRAVENOUS at 09:39

## 2021-06-30 RX ADMIN — SODIUM CHLORIDE, SODIUM LACTATE, POTASSIUM CHLORIDE, AND CALCIUM CHLORIDE: .6; .31; .03; .02 INJECTION, SOLUTION INTRAVENOUS at 09:31

## 2021-06-30 RX ADMIN — LIDOCAINE HYDROCHLORIDE 50 MG: 10 INJECTION, SOLUTION EPIDURAL; INFILTRATION; INTRACAUDAL; PERINEURAL at 09:38

## 2021-06-30 RX ADMIN — PROPOFOL 50 MG: 10 INJECTION, EMULSION INTRAVENOUS at 09:45

## 2021-06-30 NOTE — H&P
History and Physical - [facility]  Virginie Nelson 73 y o  male MRN: [id number]                  HPI: Virginie Nelson is a 73y o  year old male who presents for upper endoscopy follow-up on esophageal ulcerations and antral ulceration      REVIEW OF SYSTEMS: Per the HPI, and otherwise unremarkable  Historical Information   Past Medical History:   Diagnosis Date    Cataract     Colon polyp     GERD (gastroesophageal reflux disease)     Hyperlipidemia     Seasonal allergies     Tinnitus     Wears glasses      Past Surgical History:   Procedure Laterality Date    COLONOSCOPY      FL COLONOSCOPY FLX DX W/COLLJ SPEC WHEN PFRMD N/A 2016    Procedure: COLONOSCOPY;  Surgeon: Nam Paulson MD;  Location: [facility] GI LAB; Service: Gastroenterology    FL EGD TRANSORAL BIOPSY SINGLE/MULTIPLE N/A 2016    Procedure: ESOPHAGOGASTRODUODENOSCOPY (EGD); Surgeon: Nma Paulson MD;  Location: [facility] GI LAB;   Service: Gastroenterology    TONSILLECTOMY N/A     child     Social History   Social History     Substance and Sexual Activity   Alcohol Use Yes    Comment: on occ     Social History     Substance and Sexual Activity   Drug Use No     Social History     Tobacco Use   Smoking Status Former Smoker    Quit date: 2001    Years since quittin 3   Smokeless Tobacco Never Used     Family History   Problem Relation Age of Onset    Arthritis Mother     Alzheimer's disease Mother     Other Mother         smoker-phlegm    Alcohol abuse Father     Suicidality Father        Meds/Allergies       Current Outpatient Medications:     fluticasone (FLONASE) 50 mcg/act nasal spray    loratadine (CLARITIN) 10 mg tablet    omeprazole (PriLOSEC) 40 MG capsule    Current Facility-Administered Medications:     lactated ringers infusion, 125 mL/hr, Intravenous, Continuous    Allergies   Allergen Reactions    Penicillins Other (See Comments)     dizziness       Objective     There were no vitals taken for this visit  PHYSICAL EXAM    Gen: NAD  Head: NCAT  CV: RRR  CHEST: Clear  ABD: soft, NT/ND  EXT: no edema      ASSESSMENT/PLAN:  This is a 73y o  year old male here for upper endoscopy, and he is stable and optimized for his procedure

## 2021-06-30 NOTE — ANESTHESIA PREPROCEDURE EVALUATION
Procedure:  EGD    Relevant Problems   GI/HEPATIC   (+) Gastroesophageal reflux disease with esophagitis without hemorrhage      NEURO/PSYCH   (+) Hx of adenomatous colonic polyps        Physical Exam    Airway    Mallampati score: II  TM Distance: >3 FB  Neck ROM: full     Dental   No notable dental hx     Cardiovascular  Cardiovascular exam normal    Pulmonary  Pulmonary exam normal     Other Findings        Anesthesia Plan  ASA Score- 2     Anesthesia Type- IV sedation with anesthesia with ASA Monitors  Additional Monitors:   Airway Plan:           Plan Factors-Exercise tolerance (METS): >4 METS  Chart reviewed  Imaging results reviewed  Existing labs reviewed  Patient summary reviewed  Patient is not a current smoker  Induction-     Postoperative Plan-     Informed Consent- Anesthetic plan and risks discussed with patient  I personally reviewed this patient with the CRNA  Discussed and agreed on the Anesthesia Plan with the CRNA  Lucia Sampson

## 2021-06-30 NOTE — ANESTHESIA POSTPROCEDURE EVALUATION
Post-Op Assessment Note    CV Status:  Stable  Pain Score: 0    Pain management: adequate     Mental Status:  Alert and awake   Hydration Status:  Euvolemic   PONV Controlled:  Controlled   Airway Patency:  Patent      Post Op Vitals Reviewed: Yes      Staff: CRNA         No complications documented      BP   95/56   Temp  97   Pulse  57   Resp   16   SpO2   98

## 2021-07-01 NOTE — RESULT ENCOUNTER NOTE
Please inform patient that their biopsies were benign  Office visit 3-4 months  Repeat EGD in 1 year to check for esophageal polyps and re-biopsy of the EG junction

## 2021-07-02 ENCOUNTER — TELEPHONE (OUTPATIENT)
Dept: GASTROENTEROLOGY | Facility: CLINIC | Age: 66
End: 2021-07-02

## 2021-07-02 NOTE — TELEPHONE ENCOUNTER
Left message for patient to call and schedule f/u ov to EGD with Dr Delisa Araiza in 3-4 months  Will call again in 1 wk if he doesn't return call to schedule

## 2021-07-02 NOTE — TELEPHONE ENCOUNTER
----- Message from Jessy Enriquez LPN sent at 3/6/2032  5:03 PM EDT -----  Patient aware  Educated  Egd 1 year  Please call patient to schedule 3-4 month f/u appt   Thank you

## 2021-09-22 DIAGNOSIS — K21.00 GASTROESOPHAGEAL REFLUX DISEASE WITH ESOPHAGITIS WITHOUT HEMORRHAGE: ICD-10-CM

## 2021-09-24 RX ORDER — OMEPRAZOLE 40 MG/1
CAPSULE, DELAYED RELEASE ORAL
Qty: 30 CAPSULE | Refills: 2 | Status: SHIPPED | OUTPATIENT
Start: 2021-09-24 | End: 2021-09-30 | Stop reason: DRUGHIGH

## 2021-09-30 ENCOUNTER — OFFICE VISIT (OUTPATIENT)
Dept: GASTROENTEROLOGY | Facility: CLINIC | Age: 66
End: 2021-09-30
Payer: COMMERCIAL

## 2021-09-30 VITALS
SYSTOLIC BLOOD PRESSURE: 132 MMHG | DIASTOLIC BLOOD PRESSURE: 85 MMHG | BODY MASS INDEX: 25.93 KG/M2 | WEIGHT: 185.19 LBS | HEART RATE: 94 BPM | OXYGEN SATURATION: 98 % | HEIGHT: 71 IN

## 2021-09-30 DIAGNOSIS — K25.9 GASTRIC ULCER, UNSPECIFIED CHRONICITY, UNSPECIFIED WHETHER GASTRIC ULCER HEMORRHAGE OR PERFORATION PRESENT: ICD-10-CM

## 2021-09-30 DIAGNOSIS — E66.3 OVERWEIGHT: ICD-10-CM

## 2021-09-30 DIAGNOSIS — K22.81 ESOPHAGEAL POLYP: Primary | ICD-10-CM

## 2021-09-30 DIAGNOSIS — Z86.010 HX OF ADENOMATOUS COLONIC POLYPS: ICD-10-CM

## 2021-09-30 DIAGNOSIS — K21.00 GASTROESOPHAGEAL REFLUX DISEASE WITH ESOPHAGITIS WITHOUT HEMORRHAGE: ICD-10-CM

## 2021-09-30 PROCEDURE — 99213 OFFICE O/P EST LOW 20 MIN: CPT | Performed by: INTERNAL MEDICINE

## 2021-09-30 RX ORDER — OMEPRAZOLE 20 MG/1
20 CAPSULE, DELAYED RELEASE ORAL DAILY
Qty: 90 CAPSULE | Refills: 1 | Status: SHIPPED | OUTPATIENT
Start: 2021-09-30 | End: 2022-04-05

## 2021-09-30 RX ORDER — ERGOCALCIFEROL 1.25 MG/1
50000 CAPSULE ORAL WEEKLY
COMMUNITY
Start: 2021-09-27

## 2021-09-30 NOTE — PROGRESS NOTES
[facility] - Outpatient Follow-up Note  Genaro Cota 73 y o  male MRN: [id number]  Encounter: [id number]          ASSESSMENT AND PLAN:      1  Esophageal polyp    Squamous papillomas repeat   EGD in June of 2022    2  Gastroesophageal reflux disease with esophagitis without hemorrhage   well controlled will decrease omeprazole to 20 mg a day  - omeprazole (PriLOSEC) 20 mg delayed release capsule; Take 1 capsule (20 mg total) by mouth daily  Dispense: 90 capsule; Refill: 1    3  Gastric ulcer, unspecified chronicity, unspecified whether gastric ulcer hemorrhage or perforation present   no H pylori I  Healing documented  - omeprazole (PriLOSEC) 20 mg delayed release capsule; Take 1 capsule (20 mg total) by mouth daily  Dispense: 90 capsule; Refill: 1    4  Hx of adenomatous colonic polyps    Next colonoscopy March of 2024    5  Overweight   will work on portion control weight loss  He will otherwise follow-up in 6 months     ______________________________________________________________________    SUBJECTIVE:    Very pleasant 36-year-old gentleman who we see for history of esophageal polyps, gastric ulcer, esophagitis and adenomatous colon polyps  He is doing very well on 40 mg of omeprazole  We discussed tapering that down  He is working on weight loss and avoids pizza which is 1 of his biggest triggers  No melena bright red blood per rectum  REVIEW OF SYSTEMS IS OTHERWISE NEGATIVE  Historical Information   Past Medical History:   Diagnosis Date    Cataract     Colon polyp     GERD (gastroesophageal reflux disease)     Hyperlipidemia     Seasonal allergies     Tinnitus     Wears glasses      Past Surgical History:   Procedure Laterality Date    COLONOSCOPY      HI COLONOSCOPY FLX DX W/COLLJ SPEC WHEN PFRMD N/A 2/29/2016    Procedure: COLONOSCOPY;  Surgeon: Blaine Fournier MD;  Location: [place] [address] GI LAB;   Service: Gastroenterology    HI EGD TRANSORAL BIOPSY SINGLE/MULTIPLE N/A 2016    Procedure: ESOPHAGOGASTRODUODENOSCOPY (EGD); Surgeon: Pedro Vyas MD;  Location: [facility]; Service: Gastroenterology    TONSILLECTOMY N/A     child     Social History   Social History     Substance and Sexual Activity   Alcohol Use Yes    Comment: on occ     Social History     Substance and Sexual Activity   Drug Use No     Social History     Tobacco Use   Smoking Status Former Smoker    Quit date: 2001    Years since quittin 6   Smokeless Tobacco Never Used     Family History   Problem Relation Age of Onset    Arthritis Mother     Alzheimer's disease Mother     Other Mother         smoker-phlegm    Alcohol abuse Father     Suicidality Father        Meds/Allergies       Current Outpatient Medications:     ergocalciferol (VITAMIN D2) 50,000 units    loratadine (CLARITIN) 10 mg tablet    fluticasone (FLONASE) 50 mcg/act nasal spray    omeprazole (PriLOSEC) 20 mg delayed release capsule    Allergies   Allergen Reactions    Penicillins Other (See Comments)     dizziness           Objective     Blood pressure 132/85, pulse 94, height 5' 11" (1 803 m), weight 84 kg (185 lb 3 oz), SpO2 98 %  Body mass index is 25 83 kg/m²  PHYSICAL EXAM:      General Appearance:   Alert, cooperative, no distress   HEENT:   Normocephalic, atraumatic, anicteric      Neck:  Supple, symmetrical, trachea midline   Lungs:   Clear to auscultation bilaterally; no rales, rhonchi or wheezing; respirations unlabored    Heart[de-identified]   Regular rate and rhythm; no murmur, rub, or gallop  Abdomen:   Soft, non-tender, non-distended; normal bowel sounds; no masses, no organomegaly    Genitalia:   Deferred    Rectal:   Deferred    Extremities:  No cyanosis, clubbing or edema    Pulses:  2+ and symmetric    Skin:  No jaundice, rashes, or lesions    Lymph nodes:  No palpable cervical lymphadenopathy        Lab Results:   No visits with results within 1 Day(s) from this visit     Latest known visit with results is:   Hospital Outpatient Visit on 06/30/2021   Component Date Value    Case Report 06/30/2021                      Value:Surgical Pathology Report                         Case: [id number]                                   Authorizing Provider:  Alex Lopez MD           Collected:           06/30/2021 8950              Ordering Location:     [facility]   Received:            06/30/2021 [address]                                                                       Pathologist:           Dia Barber MD                                                              Specimens:   A) - Esophagus, Polyp X2 Bx at 26cm-esophageal                                                      B) - Stomach, Antral Bx Dx  Erythema                                                                C) - Esophagogastric junction, Bx  EGJ Dx  Erythema                                                 D) - Stomach, Polyp BX  at hiatal hernia site                                              Final Diagnosis 06/30/2021                      Value: This result contains rich text formatting which cannot be displayed here   Note 06/30/2021                      Value: This result contains rich text formatting which cannot be displayed here   Additional Information 06/30/2021                      Value: This result contains rich text formatting which cannot be displayed here  Mariana Mosley Gross Description 06/30/2021                      Value: This result contains rich text formatting which cannot be displayed here  Radiology Results:   No results found

## 2021-12-07 ENCOUNTER — IMMUNIZATIONS (OUTPATIENT)
Dept: FAMILY MEDICINE CLINIC | Facility: HOSPITAL | Age: 66
End: 2021-12-07

## 2021-12-07 DIAGNOSIS — Z23 ENCOUNTER FOR IMMUNIZATION: Primary | ICD-10-CM

## 2021-12-07 PROCEDURE — 91306 COVID-19 MODERNA VACC 0.25 ML BOOSTER: CPT

## 2021-12-07 PROCEDURE — 0064A COVID-19 MODERNA VACC 0.25 ML BOOSTER: CPT

## 2021-12-22 PROCEDURE — U0005 INFEC AGEN DETEC AMPLI PROBE: HCPCS | Performed by: INTERNAL MEDICINE

## 2021-12-22 PROCEDURE — U0003 INFECTIOUS AGENT DETECTION BY NUCLEIC ACID (DNA OR RNA); SEVERE ACUTE RESPIRATORY SYNDROME CORONAVIRUS 2 (SARS-COV-2) (CORONAVIRUS DISEASE [COVID-19]), AMPLIFIED PROBE TECHNIQUE, MAKING USE OF HIGH THROUGHPUT TECHNOLOGIES AS DESCRIBED BY CMS-2020-01-R: HCPCS | Performed by: INTERNAL MEDICINE

## 2022-04-05 DIAGNOSIS — K21.00 GASTROESOPHAGEAL REFLUX DISEASE WITH ESOPHAGITIS WITHOUT HEMORRHAGE: ICD-10-CM

## 2022-04-05 DIAGNOSIS — K25.9 GASTRIC ULCER, UNSPECIFIED CHRONICITY, UNSPECIFIED WHETHER GASTRIC ULCER HEMORRHAGE OR PERFORATION PRESENT: ICD-10-CM

## 2022-04-05 RX ORDER — OMEPRAZOLE 20 MG/1
CAPSULE, DELAYED RELEASE ORAL
Qty: 90 CAPSULE | Refills: 1 | Status: SHIPPED | OUTPATIENT
Start: 2022-04-05

## 2022-09-27 ENCOUNTER — OFFICE VISIT (OUTPATIENT)
Dept: GASTROENTEROLOGY | Facility: CLINIC | Age: 67
End: 2022-09-27
Payer: MEDICARE

## 2022-09-27 VITALS
WEIGHT: 196.2 LBS | HEIGHT: 71 IN | SYSTOLIC BLOOD PRESSURE: 154 MMHG | BODY MASS INDEX: 27.47 KG/M2 | HEART RATE: 58 BPM | DIASTOLIC BLOOD PRESSURE: 88 MMHG

## 2022-09-27 DIAGNOSIS — K21.00 GASTROESOPHAGEAL REFLUX DISEASE WITH ESOPHAGITIS WITHOUT HEMORRHAGE: Primary | ICD-10-CM

## 2022-09-27 DIAGNOSIS — Z86.010 HX OF ADENOMATOUS COLONIC POLYPS: ICD-10-CM

## 2022-09-27 DIAGNOSIS — K25.9 GASTRIC ULCER, UNSPECIFIED CHRONICITY, UNSPECIFIED WHETHER GASTRIC ULCER HEMORRHAGE OR PERFORATION PRESENT: ICD-10-CM

## 2022-09-27 DIAGNOSIS — K31.A11 INTESTINAL METAPLASIA OF ANTRUM OF STOMACH WITHOUT DYSPLASIA: ICD-10-CM

## 2022-09-27 DIAGNOSIS — I10 PRIMARY HYPERTENSION: ICD-10-CM

## 2022-09-27 DIAGNOSIS — K22.81 ESOPHAGEAL POLYP: ICD-10-CM

## 2022-09-27 PROCEDURE — 99214 OFFICE O/P EST MOD 30 MIN: CPT | Performed by: INTERNAL MEDICINE

## 2022-09-27 RX ORDER — OMEPRAZOLE 20 MG/1
20 CAPSULE, DELAYED RELEASE ORAL DAILY
Qty: 90 CAPSULE | Refills: 1 | Status: SHIPPED | OUTPATIENT
Start: 2022-09-27

## 2022-09-27 NOTE — PROGRESS NOTES
[facility] - Outpatient Follow-up Note  Josee Amaro 78 y o  male MRN: [id number]  Encounter: [id number]          ASSESSMENT AND PLAN:      1  Gastroesophageal reflux disease with esophagitis without hemorrhage  Controlled with Prilosec as needed  - EGD; Future  - omeprazole (PriLOSEC) 20 mg delayed release capsule; Take 1 capsule (20 mg total) by mouth daily  Dispense: 90 capsule; Refill: 1    2  Gastric ulcer, unspecified chronicity, unspecified whether gastric ulcer hemorrhage or perforation present  In 2021  - EGD; Future  - omeprazole (PriLOSEC) 20 mg delayed release capsule; Take 1 capsule (20 mg total) by mouth daily  Dispense: 90 capsule; Refill: 1    3  Esophageal polyp  In 2021  - EGD; Future    4  Intestinal metaplasia of antrum of stomach without dysplasia  In 2021 on 1 occasion   - EGD; Future    5  Hx of adenomatous colonic polyps  Next colonoscopy due April of 2024    6  Primary hypertension  Polyps his blood pressure at home  Will keep a closer eye on it  He will otherwise follow-up in 6 months     ______________________________________________________________________    SUBJECTIVE:  Very pleasant gentleman 78years of age who we see for colon cancer screening gastroesophageal reflux  In 2021 he did have some goblet cells in the antrum along with a gastric ulcer he is due for repeat EGD additionally had a squamous papilloma removed from the esophagus this can also be followed up with his upcoming EGD  He uses a omeprazole sometimes on a daily basis sometimes wean himself off  We discussed conservative measures for controlling his reflux  His colon cancer screening is up-to-date  He is working on portion control weight loss  REVIEW OF SYSTEMS IS OTHERWISE NEGATIVE        Historical Information   Past Medical History:   Diagnosis Date    Cataract     Colon polyp     GERD (gastroesophageal reflux disease)     Hyperlipidemia     Seasonal allergies     Tinnitus  Wears glasses      Past Surgical History:   Procedure Laterality Date    COLONOSCOPY      MN COLONOSCOPY FLX DX W/COLLJ SPEC WHEN PFRMD N/A 2016    Procedure: COLONOSCOPY;  Surgeon: Abdiel Griffith MD;  Location[facility]; Service: Gastroenterology    MN EGD TRANSORAL BIOPSY SINGLE/MULTIPLE N/A 2016    Procedure: ESOPHAGOGASTRODUODENOSCOPY (EGD); Surgeon: Abdiel Griffith MD;  [facility]; Service: Gastroenterology    TONSILLECTOMY N/A     child     Social History   Social History     Substance and Sexual Activity   Alcohol Use Yes    Comment: on occ     Social History     Substance and Sexual Activity   Drug Use No     Social History     Tobacco Use   Smoking Status Former Smoker    Quit date: 2001    Years since quittin 5   Smokeless Tobacco Never Used     Family History   Problem Relation Age of Onset    Arthritis Mother     Alzheimer's disease Mother     Other Mother         smoker-phlegm    Alcohol abuse Father     Suicidality Father        Meds/Allergies       Current Outpatient Medications:     ergocalciferol (VITAMIN D2) 50,000 units    loratadine (CLARITIN) 10 mg tablet    omeprazole (PriLOSEC) 20 mg delayed release capsule    fluticasone (FLONASE) 50 mcg/act nasal spray    Allergies   Allergen Reactions    Penicillins Other (See Comments)     dizziness           Objective     Blood pressure 154/88, pulse 58, height 5' 11" (1 803 m), weight 89 kg (196 lb 3 2 oz)  Body mass index is 27 36 kg/m²  PHYSICAL EXAM:      General Appearance:   Alert, cooperative, no distress   HEENT:   Normocephalic, atraumatic, anicteric      Neck:  Supple, symmetrical, trachea midline   Lungs:   Clear to auscultation bilaterally; no rales, rhonchi or wheezing; respirations unlabored    Heart[de-identified]   Regular rate and rhythm; no murmur, rub, or gallop     Abdomen:   Soft, non-tender, non-distended; normal bowel sounds; no masses, no organomegaly    Genitalia:   Deferred  Rectal:   Deferred    Extremities:  No cyanosis, clubbing or edema    Pulses:  2+ and symmetric    Skin:  No jaundice, rashes, or lesions    Lymph nodes:  No palpable cervical lymphadenopathy        Lab Results:   No visits with results within 1 Day(s) from this visit  Latest known visit with results is:   Orders Only on 12/22/2021   Component Date Value    SARS-CoV-2 12/22/2021 Negative          Radiology Results:   No results found

## 2022-10-21 ENCOUNTER — TELEPHONE (OUTPATIENT)
Dept: GASTROENTEROLOGY | Facility: AMBULARY SURGERY CENTER | Age: 67
End: 2022-10-21

## 2022-10-21 NOTE — TELEPHONE ENCOUNTER
Patients GI provider:  Dr Leopoldo Blew    Number to return call: (    [phone]    Reason for call: Pt calling to cancel egd    Scheduled procedure/appointment date if applicable: Apt/procedure   11-16-22

## 2023-01-06 ENCOUNTER — OFFICE VISIT (OUTPATIENT)
Dept: INTERNAL MEDICINE CLINIC | Facility: CLINIC | Age: 68
End: 2023-01-06

## 2023-01-06 ENCOUNTER — APPOINTMENT (OUTPATIENT)
Dept: LAB | Facility: CLINIC | Age: 68
End: 2023-01-06

## 2023-01-06 VITALS
BODY MASS INDEX: 27.52 KG/M2 | WEIGHT: 196.6 LBS | HEART RATE: 78 BPM | SYSTOLIC BLOOD PRESSURE: 130 MMHG | DIASTOLIC BLOOD PRESSURE: 84 MMHG | HEIGHT: 71 IN | TEMPERATURE: 98 F | OXYGEN SATURATION: 98 % | RESPIRATION RATE: 15 BRPM

## 2023-01-06 DIAGNOSIS — E78.2 MIXED HYPERLIPIDEMIA: ICD-10-CM

## 2023-01-06 DIAGNOSIS — K25.7 CHRONIC GASTRIC ULCER WITHOUT HEMORRHAGE AND WITHOUT PERFORATION: ICD-10-CM

## 2023-01-06 DIAGNOSIS — Z13.0 SCREENING FOR DEFICIENCY ANEMIA: ICD-10-CM

## 2023-01-06 DIAGNOSIS — Z13.6 SCREENING FOR CARDIOVASCULAR CONDITION: ICD-10-CM

## 2023-01-06 DIAGNOSIS — I63.81 LACUNAR INFARCT, ACUTE (HCC): ICD-10-CM

## 2023-01-06 DIAGNOSIS — N18.31 STAGE 3A CHRONIC KIDNEY DISEASE (HCC): Primary | ICD-10-CM

## 2023-01-06 DIAGNOSIS — E55.9 VITAMIN D DEFICIENCY: ICD-10-CM

## 2023-01-06 DIAGNOSIS — R73.9 HYPERGLYCEMIA: ICD-10-CM

## 2023-01-06 DIAGNOSIS — Z00.00 ENCOUNTER FOR SUBSEQUENT ANNUAL WELLNESS VISIT (AWV) IN MEDICARE PATIENT: ICD-10-CM

## 2023-01-06 DIAGNOSIS — I70.213 ATHEROSCLEROSIS OF NATIVE ARTERIES OF EXTREMITIES WITH INTERMITTENT CLAUDICATION, BILATERAL LEGS (HCC): ICD-10-CM

## 2023-01-06 DIAGNOSIS — K21.00 GASTROESOPHAGEAL REFLUX DISEASE WITH ESOPHAGITIS WITHOUT HEMORRHAGE: ICD-10-CM

## 2023-01-06 DIAGNOSIS — H93.13 TINNITUS OF BOTH EARS: ICD-10-CM

## 2023-01-06 LAB
25(OH)D3 SERPL-MCNC: 19.7 NG/ML (ref 30–100)
ALBUMIN SERPL BCP-MCNC: 3.8 G/DL (ref 3.5–5)
ALP SERPL-CCNC: 100 U/L (ref 46–116)
ALT SERPL W P-5'-P-CCNC: 26 U/L (ref 12–78)
ANION GAP SERPL CALCULATED.3IONS-SCNC: 3 MMOL/L (ref 4–13)
AST SERPL W P-5'-P-CCNC: 19 U/L (ref 5–45)
BACTERIA UR QL AUTO: ABNORMAL /HPF
BASOPHILS # BLD AUTO: 0.06 THOUSANDS/ΜL (ref 0–0.1)
BASOPHILS NFR BLD AUTO: 1 % (ref 0–1)
BILIRUB SERPL-MCNC: 0.7 MG/DL (ref 0.2–1)
BILIRUB UR QL STRIP: NEGATIVE
BUN SERPL-MCNC: 17 MG/DL (ref 5–25)
CALCIUM SERPL-MCNC: 9.4 MG/DL (ref 8.3–10.1)
CHLORIDE SERPL-SCNC: 107 MMOL/L (ref 96–108)
CHOLEST SERPL-MCNC: 248 MG/DL
CLARITY UR: ABNORMAL
CO2 SERPL-SCNC: 29 MMOL/L (ref 21–32)
COLOR UR: ABNORMAL
CREAT SERPL-MCNC: 1.27 MG/DL (ref 0.6–1.3)
CREAT UR-MCNC: 265 MG/DL
EOSINOPHIL # BLD AUTO: 0.17 THOUSAND/ΜL (ref 0–0.61)
EOSINOPHIL NFR BLD AUTO: 3 % (ref 0–6)
ERYTHROCYTE [DISTWIDTH] IN BLOOD BY AUTOMATED COUNT: 12.8 % (ref 11.6–15.1)
GFR SERPL CREATININE-BSD FRML MDRD: 58 ML/MIN/1.73SQ M
GLUCOSE P FAST SERPL-MCNC: 101 MG/DL (ref 65–99)
GLUCOSE UR STRIP-MCNC: NEGATIVE MG/DL
HCT VFR BLD AUTO: 42.9 % (ref 36.5–49.3)
HDLC SERPL-MCNC: 66 MG/DL
HGB BLD-MCNC: 14.2 G/DL (ref 12–17)
HGB UR QL STRIP.AUTO: ABNORMAL
IMM GRANULOCYTES # BLD AUTO: 0.02 THOUSAND/UL (ref 0–0.2)
IMM GRANULOCYTES NFR BLD AUTO: 0 % (ref 0–2)
KETONES UR STRIP-MCNC: NEGATIVE MG/DL
LDLC SERPL CALC-MCNC: 166 MG/DL (ref 0–100)
LEUKOCYTE ESTERASE UR QL STRIP: NEGATIVE
LYMPHOCYTES # BLD AUTO: 1.18 THOUSANDS/ΜL (ref 0.6–4.47)
LYMPHOCYTES NFR BLD AUTO: 22 % (ref 14–44)
MCH RBC QN AUTO: 29.2 PG (ref 26.8–34.3)
MCHC RBC AUTO-ENTMCNC: 33.1 G/DL (ref 31.4–37.4)
MCV RBC AUTO: 88 FL (ref 82–98)
MICROALBUMIN UR-MCNC: 10.6 MG/L (ref 0–20)
MICROALBUMIN/CREAT 24H UR: 4 MG/G CREATININE (ref 0–30)
MONOCYTES # BLD AUTO: 0.47 THOUSAND/ΜL (ref 0.17–1.22)
MONOCYTES NFR BLD AUTO: 9 % (ref 4–12)
MUCOUS THREADS UR QL AUTO: ABNORMAL
NEUTROPHILS # BLD AUTO: 3.51 THOUSANDS/ΜL (ref 1.85–7.62)
NEUTS SEG NFR BLD AUTO: 65 % (ref 43–75)
NITRITE UR QL STRIP: NEGATIVE
NON-SQ EPI CELLS URNS QL MICRO: ABNORMAL /HPF
NRBC BLD AUTO-RTO: 0 /100 WBCS
PH UR STRIP.AUTO: 6 [PH]
PLATELET # BLD AUTO: 196 THOUSANDS/UL (ref 149–390)
PMV BLD AUTO: 9.9 FL (ref 8.9–12.7)
POTASSIUM SERPL-SCNC: 4.4 MMOL/L (ref 3.5–5.3)
PROT SERPL-MCNC: 7.4 G/DL (ref 6.4–8.4)
PROT UR STRIP-MCNC: ABNORMAL MG/DL
RBC # BLD AUTO: 4.86 MILLION/UL (ref 3.88–5.62)
RBC #/AREA URNS AUTO: ABNORMAL /HPF
SODIUM SERPL-SCNC: 139 MMOL/L (ref 135–147)
SP GR UR STRIP.AUTO: 1.02 (ref 1–1.03)
TRIGL SERPL-MCNC: 81 MG/DL
UROBILINOGEN UR STRIP-ACNC: <2 MG/DL
WBC # BLD AUTO: 5.41 THOUSAND/UL (ref 4.31–10.16)
WBC #/AREA URNS AUTO: ABNORMAL /HPF

## 2023-01-06 RX ORDER — MELATONIN
1000 DAILY
COMMUNITY

## 2023-01-06 NOTE — ASSESSMENT & PLAN NOTE
Complete annual wellness exam done counseling screening recommendation work-up follow-up in place for details see attached annual wellness

## 2023-01-06 NOTE — PATIENT INSTRUCTIONS
Medicare Preventive Visit Patient Instructions  Thank you for completing your Welcome to Medicare Visit or Medicare Annual Wellness Visit today  Your next wellness visit will be due in one year (1/7/2024)  The screening/preventive services that you may require over the next 5-10 years are detailed below  Some tests may not apply to you based off risk factors and/or age  Screening tests ordered at today's visit but not completed yet may show as past due  Also, please note that scanned in results may not display below  Preventive Screenings:  Service Recommendations Previous Testing/Comments   Colorectal Cancer Screening  · Colonoscopy    · Fecal Occult Blood Test (FOBT)/Fecal Immunochemical Test (FIT)  · Fecal DNA/Cologuard Test  · Flexible Sigmoidoscopy Age: 39-70 years old   Colonoscopy: every 10 years (May be performed more frequently if at higher risk)  OR  FOBT/FIT: every 1 year  OR  Cologuard: every 3 years  OR  Sigmoidoscopy: every 5 years  Screening may be recommended earlier than age 39 if at higher risk for colorectal cancer  Also, an individualized decision between you and your healthcare provider will decide whether screening between the ages of 74-80 would be appropriate   Colonoscopy: 04/21/2021  FOBT/FIT: Not on file  Cologuard: Not on file  Sigmoidoscopy: Not on file          Prostate Cancer Screening Individualized decision between patient and health care provider in men between ages of 53-78   Medicare will cover every 12 months beginning on the day after your 50th birthday PSA: No results in last 5 years           Hepatitis C Screening Once for adults born between 80 and 1965  More frequently in patients at high risk for Hepatitis C Hep C Antibody: Not on file        Diabetes Screening 1-2 times per year if you're at risk for diabetes or have pre-diabetes Fasting glucose: 101 mg/dL (11/2/2020)  A1C: 5 6 % (11/2/2020)      Cholesterol Screening Once every 5 years if you don't have a lipid disorder  May order more often based on risk factors  Lipid panel: 11/02/2020         Other Preventive Screenings Covered by Medicare:  1  Abdominal Aortic Aneurysm (AAA) Screening: covered once if your at risk  You're considered to be at risk if you have a family history of AAA or a male between the age of 73-68 who smoking at least 100 cigarettes in your lifetime  2  Lung Cancer Screening: covers low dose CT scan once per year if you meet all of the following conditions: (1) Age 50-69; (2) No signs or symptoms of lung cancer; (3) Current smoker or have quit smoking within the last 15 years; (4) You have a tobacco smoking history of at least 20 pack years (packs per day x number of years you smoked); (5) You get a written order from a healthcare provider  3  Glaucoma Screening: covered annually if you're considered high risk: (1) You have diabetes OR (2) Family history of glaucoma OR (3)  aged 48 and older OR (3)  American aged 72 and older  3  Osteoporosis Screening: covered every 2 years if you meet one of the following conditions: (1) Have a vertebral abnormality; (2) On glucocorticoid therapy for more than 3 months; (3) Have primary hyperparathyroidism; (4) On osteoporosis medications and need to assess response to drug therapy  5  HIV Screening: covered annually if you're between the age of 12-76  Also covered annually if you are younger than 13 and older than 72 with risk factors for HIV infection  For pregnant patients, it is covered up to 3 times per pregnancy      Immunizations:  Immunization Recommendations   Influenza Vaccine Annual influenza vaccination during flu season is recommended for all persons aged >= 6 months who do not have contraindications   Pneumococcal Vaccine   * Pneumococcal conjugate vaccine = PCV13 (Prevnar 13), PCV15 (Vaxneuvance), PCV20 (Prevnar 20)  * Pneumococcal polysaccharide vaccine = PPSV23 (Pneumovax) Adults 25-60 years old: 1-3 doses may be recommended based on certain risk factors  Adults 72 years old: 1-2 doses may be recommended based off what pneumonia vaccine you previously received   Hepatitis B Vaccine 3 dose series if at intermediate or high risk (ex: diabetes, end stage renal disease, liver disease)   Tetanus (Td) Vaccine - COST NOT COVERED BY MEDICARE PART B Following completion of primary series, a booster dose should be given every 10 years to maintain immunity against tetanus  Td may also be given as tetanus wound prophylaxis  Tdap Vaccine - COST NOT COVERED BY MEDICARE PART B Recommended at least once for all adults  For pregnant patients, recommended with each pregnancy  Shingles Vaccine (Shingrix) - COST NOT COVERED BY MEDICARE PART B  2 shot series recommended in those aged 48 and above     Health Maintenance Due:      Topic Date Due   • Hepatitis C Screening  Never done   • Colorectal Cancer Screening  04/21/2031     Immunizations Due:      Topic Date Due   • Hepatitis B Vaccine (1 of 3 - 3-dose series) Never done   • Pneumococcal Vaccine: 65+ Years (1 - PCV) Never done   • COVID-19 Vaccine (4 - Booster for Moderna series) 02/01/2022   • Influenza Vaccine (1) Never done     Advance Directives   What are advance directives? Advance directives are legal documents that state your wishes and plans for medical care  These plans are made ahead of time in case you lose your ability to make decisions for yourself  Advance directives can apply to any medical decision, such as the treatments you want, and if you want to donate organs  What are the types of advance directives? There are many types of advance directives, and each state has rules about how to use them  You may choose a combination of any of the following:  · Living will: This is a written record of the treatment you want  You can also choose which treatments you do not want, which to limit, and which to stop at a certain time   This includes surgery, medicine, IV fluid, and tube feedings  · Durable power of  for healthcare [facility]): This is a written record that states who you want to make healthcare choices for you when you are unable to make them for yourself  This person, called a proxy, is usually a family member or a friend  You may choose more than 1 proxy  · Do not resuscitate (DNR) order:  A DNR order is used in case your heart stops beating or you stop breathing  It is a request not to have certain forms of treatment, such as CPR  A DNR order may be included in other types of advance directives  · Medical directive: This covers the care that you want if you are in a coma, near death, or unable to make decisions for yourself  You can list the treatments you want for each condition  Treatment may include pain medicine, surgery, blood transfusions, dialysis, IV or tube feedings, and a ventilator (breathing machine)  · Values history: This document has questions about your views, beliefs, and how you feel and think about life  This information can help others choose the care that you would choose  Why are advance directives important? An advance directive helps you control your care  Although spoken wishes may be used, it is better to have your wishes written down  Spoken wishes can be misunderstood, or not followed  Treatments may be given even if you do not want them  An advance directive may make it easier for your family to make difficult choices about your care  Weight Management   Why it is important to manage your weight:  Being overweight increases your risk of health conditions such as heart disease, high blood pressure, type 2 diabetes, and certain types of cancer  It can also increase your risk for osteoarthritis, sleep apnea, and other respiratory problems  Aim for a slow, steady weight loss  Even a small amount of weight loss can lower your risk of health problems    How to lose weight safely:  A safe and healthy way to lose weight is to eat fewer calories and get regular exercise  You can lose up about 1 pound a week by decreasing the number of calories you eat by 500 calories each day  Healthy meal plan for weight management:  A healthy meal plan includes a variety of foods, contains fewer calories, and helps you stay healthy  A healthy meal plan includes the following:  · Eat whole-grain foods more often  A healthy meal plan should contain fiber  Fiber is the part of grains, fruits, and vegetables that is not broken down by your body  Whole-grain foods are healthy and provide extra fiber in your diet  Some examples of whole-grain foods are whole-wheat breads and pastas, oatmeal, brown rice, and bulgur  · Eat a variety of vegetables every day  Include dark, leafy greens such as spinach, kale, rajesh greens, and mustard greens  Eat yellow and orange vegetables such as carrots, sweet potatoes, and winter squash  · Eat a variety of fruits every day  Choose fresh or canned fruit (canned in its own juice or light syrup) instead of juice  Fruit juice has very little or no fiber  · Eat low-fat dairy foods  Drink fat-free (skim) milk or 1% milk  Eat fat-free yogurt and low-fat cottage cheese  Try low-fat cheeses such as mozzarella and other reduced-fat cheeses  · Choose meat and other protein foods that are low in fat  Choose beans or other legumes such as split peas or lentils  Choose fish, skinless poultry (chicken or turkey), or lean cuts of red meat (beef or pork)  Before you cook meat or poultry, cut off any visible fat  · Use less fat and oil  Try baking foods instead of frying them  Add less fat, such as margarine, sour cream, regular salad dressing and mayonnaise to foods  Eat fewer high-fat foods  Some examples of high-fat foods include french fries, doughnuts, ice cream, and cakes  · Eat fewer sweets  Limit foods and drinks that are high in sugar  This includes candy, cookies, regular soda, and sweetened drinks    Exercise: Exercise at least 30 minutes per day on most days of the week  Some examples of exercise include walking, biking, dancing, and swimming  You can also fit in more physical activity by taking the stairs instead of the elevator or parking farther away from stores  Ask your healthcare provider about the best exercise plan for you  © Copyright [employer] 2018 Information is for End User's use only and may not be sold, redistributed or otherwise used for commercial purposes   All illustrations and images included in CareNotes® are the copyrighted property of [employer]  or [address] [place] [address]

## 2023-01-06 NOTE — ASSESSMENT & PLAN NOTE
No sign of no sign of symptoms of ischemia both legs continue baby aspirin awaiting lipid profile check LDL if needed start statin

## 2023-01-06 NOTE — PROGRESS NOTES
Assessment and Plan:     Problem List Items Addressed This Visit        Digestive    Gastroesophageal reflux disease with esophagitis without hemorrhage     Symptoms controlled with Pepcid continue Pepcid         Chronic gastric ulcer without hemorrhage and without perforation     Symptoms controlled with Pepcid patient was on Protonix discontinued awaiting to be seen by GI         Relevant Orders    Ambulatory Referral to Gastroenterology       Cardiovascular and Mediastinum    Atherosclerosis of native arteries of extremities with intermittent claudication, bilateral legs ([facility] [address] ) - Primary     No sign of no sign of symptoms of ischemia both legs continue baby aspirin awaiting lipid profile check LDL if needed start statin            Nervous and Auditory    Lacunar infarct, acute ([facility] [address] )     History of right basal ganglia lacunar infarct in 2020 advise baby aspirin check lipid profile LDL if needed start statin         Relevant Orders    Comprehensive metabolic panel       Other    Tinnitus of both ears     For more than 2 years awaiting to be seen by ENT         Relevant Orders    Ambulatory Referral to Otolaryngology   Other Visit Diagnoses     Vitamin D deficiency        Relevant Orders    Vitamin D 25 hydroxy    Screening for cardiovascular condition        Relevant Orders    Comprehensive metabolic panel    Screening for deficiency anemia        Relevant Orders    CBC and differential    Hyperglycemia        Relevant Orders    Hemoglobin A1C    Microalbumin / creatinine urine ratio    Urinalysis with microscopic    Mixed hyperlipidemia        Relevant Orders    Lipid Panel with Direct LDL reflex        BMI Counseling: Body mass index is 27 42 kg/m²   The BMI is above normal  Nutrition recommendations include decreasing portion sizes, encouraging healthy choices of fruits and vegetables, decreasing fast food intake, consuming healthier snacks, limiting drinks that contain sugar, moderation in carbohydrate intake, increasing intake of lean protein, reducing intake of saturated and trans fat and reducing intake of cholesterol  Exercise recommendations include exercising 3-5 times per week  No pharmacotherapy was ordered  Patient referred to PCP  Rationale for BMI follow-up plan is due to patient being overweight or obese  Depression Screening and Follow-up Plan: Patient was screened for depression during today's encounter  They screened negative with a PHQ-2 score of 0  Preventive health issues were discussed with patient, and age appropriate screening tests were ordered as noted in patient's After Visit Summary  Personalized health advice and appropriate referrals for health education or preventive services given if needed, as noted in patient's After Visit Summary  History of Present Illness:     Patient presents for a Medicare Wellness Visit    Patient came in for follow-up for more than after more than 2 years complains of ringing in the ears both side almost for 2 years initially at the time was treated for chronic sinusitis the sinusitis resolved still persistent tinnitus both side with her suspected hearing loss awaiting to be seen by ENT also history of lacunar infarct more than 2 years ago no recurrence or no symptoms since then also for blood work and yearly wellness exam which was done all the counseling screening recommendation and follow-up done for details see attached annual wellness      Patient Care Team:  Nick Waite MD as PCP - General (Internal Medicine)  Nick Waite MD as PCP - PCP-[facility] ([facility])  Ronit Serrano MD as Endoscopist     Review of Systems:     Review of Systems   Constitutional: Negative for activity change, appetite change, chills, diaphoresis, fever and unexpected weight change  HENT: Positive for hearing loss and tinnitus   Negative for congestion, dental problem, drooling, ear discharge, ear pain, facial swelling, mouth sores, nosebleeds, postnasal drip, rhinorrhea, sinus pressure, sneezing, sore throat, trouble swallowing and voice change  Eyes: Negative for photophobia, pain, discharge, redness, itching and visual disturbance  Respiratory: Negative for apnea, cough, choking, chest tightness, shortness of breath, wheezing and stridor  Cardiovascular: Negative for chest pain, palpitations and leg swelling  Gastrointestinal: Negative for abdominal distention, abdominal pain, anal bleeding, blood in stool, constipation, diarrhea, nausea, rectal pain and vomiting  Endocrine: Negative for cold intolerance, heat intolerance, polydipsia, polyphagia and polyuria  Genitourinary: Negative for decreased urine volume, difficulty urinating, dysuria, enuresis, flank pain, frequency, genital sores, hematuria and urgency  Musculoskeletal: Negative for arthralgias, back pain, gait problem, joint swelling, myalgias, neck pain and neck stiffness  Skin: Negative for color change, pallor, rash and wound  Allergic/Immunologic: Negative  Negative for environmental allergies, food allergies and immunocompromised state  Neurological: Negative for dizziness, tremors, seizures, syncope, facial asymmetry, speech difficulty, weakness, light-headedness, numbness and headaches  Psychiatric/Behavioral: Negative for agitation, behavioral problems, confusion, decreased concentration, dysphoric mood, hallucinations, self-injury, sleep disturbance and suicidal ideas  The patient is not nervous/anxious and is not hyperactive           Problem List:     Patient Active Problem List   Diagnosis   • Dizziness   • Headaches, cluster   • Tinnitus of both ears   • Sinusitis chronic, frontal   • Gastroesophageal reflux disease with esophagitis without hemorrhage   • Hx of adenomatous colonic polyps   • Lacunar infarct, acute (HCC)   • Atherosclerosis of native arteries of extremities with intermittent claudication, bilateral legs (HCC)   • Chronic gastric ulcer without hemorrhage and without perforation      Past Medical and Surgical History:     Past Medical History:   Diagnosis Date   • Cataract    • Colon polyp    • Dizziness    • GERD (gastroesophageal reflux disease)    • Hyperlipidemia    • Lightheadedness    • Seasonal allergies    • Tinnitus    • Wears glasses      Past Surgical History:   Procedure Laterality Date   • COLONOSCOPY     • NC COLONOSCOPY FLX DX W/COLLJ SPEC WHEN PFRMD N/A 2016    Procedure: COLONOSCOPY;  Surgeon: Jarrett Hines MD;  Location: [facility] GI LAB; Service: Gastroenterology   • NC EGD TRANSORAL BIOPSY SINGLE/MULTIPLE N/A 2016    Procedure: ESOPHAGOGASTRODUODENOSCOPY (EGD); Surgeon: Jarrett Hines MD;  Location: [facility] GI LAB; Service: Gastroenterology   • TONSILLECTOMY N/A     child      Family History:     Family History   Problem Relation Age of Onset   • Arthritis Mother    • Alzheimer's disease Mother    • Other Mother         smoker-phlegm   • Alcohol abuse Father    • Suicidality Father       Social History:     Social History     Socioeconomic History   • Marital status: Single     Spouse name: None   • Number of children: None   • Years of education: None   • Highest education level: None   Occupational History   • None   Tobacco Use   • Smoking status: Former     Types: Cigarettes     Quit date: 2001     Years since quittin 8   • Smokeless tobacco: Never   Vaping Use   • Vaping Use: Never used   Substance and Sexual Activity   • Alcohol use: Yes     Comment: on occ   • Drug use: No   • Sexual activity: None   Other Topics Concern   • None   Social History Narrative   • None     Social Determinants of Health     Financial Resource Strain: Low Risk    • Difficulty of Paying Living Expenses: Not very hard   Food Insecurity: Not on file   Transportation Needs: No Transportation Needs   • Lack of Transportation (Medical): No   • Lack of Transportation (Non-Medical):  No   Physical Activity: Not on file   Stress: Not on file Social Connections: Not on file   Intimate Partner Violence: Not on file   Housing Stability: Not on file      Medications and Allergies:     Current Outpatient Medications   Medication Sig Dispense Refill   • cholecalciferol (VITAMIN D3) 1,000 units tablet Take 1,000 Units by mouth daily       No current facility-administered medications for this visit  Allergies   Allergen Reactions   • Penicillins Other (See Comments)     dizziness      Immunizations:     Immunization History   Administered Date(s) Administered   • COVID-19 MODERNA VACC 0 25 ML IM BOOSTER 12/07/2021   • COVID-19 MODERNA VACC 0 5 ML IM 05/05/2021, 06/07/2021      Health Maintenance:         Topic Date Due   • Hepatitis C Screening  Never done   • Colorectal Cancer Screening  04/21/2031         Topic Date Due   • Hepatitis B Vaccine (1 of 3 - 3-dose series) Never done   • Pneumococcal Vaccine: 65+ Years (1 - PCV) Never done   • COVID-19 Vaccine (4 - Booster for [place] series) 02/01/2022   • Influenza Vaccine (1) Never done      Medicare Screening Tests and Risk Assessments:     Dearl Phalen is here for his Subsequent Wellness visit  Health Risk Assessment:   Patient rates overall health as fair  Patient feels that their physical health rating is slightly better  Patient is satisfied with their life  Eyesight was rated as same  Hearing was rated as slightly worse  Patient feels that their emotional and mental health rating is same  Patients states they are sometimes angry  Patient states they are sometimes unusually tired/fatigued  Pain experienced in the last 7 days has been some  Patient's pain rating has been 7/10  Patient states that he has experienced no weight loss or gain in last 6 months  Fall Risk Screening:    In the past year, patient has experienced: history of falling in past year    Number of falls: 1  Injured during fall?: No    Feels unsteady when standing or walking?: No    Worried about falling?: No      Home Safety:  Patient does not have trouble with stairs inside or outside of their home  Patient has working smoke alarms and has working carbon monoxide detector  Home safety hazards include: none  Nutrition:   Current diet is Low Saturated Fat and Low Carb  High protein lots of salads  Medications:   Patient is currently taking over-the-counter supplements  OTC medications include: see medication list  Patient is able to manage medications  Activities of Daily Living (ADLs)/Instrumental Activities of Daily Living (IADLs):   Walk and transfer into and out of bed and chair?: Yes  Dress and groom yourself?: Yes    Bathe or shower yourself?: Yes    Feed yourself? Yes  Do your laundry/housekeeping?: Yes  Manage your money, pay your bills and track your expenses?: Yes  Make your own meals?: Yes    Do your own shopping?: Yes    Previous Hospitalizations:   Any hospitalizations or ED visits within the last 12 months?: No      Advance Care Planning:   Living will: No    Durable POA for healthcare: No    Advanced directive: No    Advanced directive counseling given: No    Five wishes given: No    Patient declined ACP directive: No    End of Life Decisions reviewed with patient: No    Provider agrees with end of life decisions: No      Cognitive Screening:   Provider or family/friend/caregiver concerned regarding cognition?: No    PREVENTIVE SCREENINGS      Cardiovascular Screening:    General: Screening Current    Due for: Lipid Panel, Lipoprotein, Cholesterol and Triglycerides      Diabetes Screening:       Due for: Blood Glucose      Colorectal Cancer Screening:     General: Screening Current      Prostate Cancer Screening:      Due for: PSA      Abdominal Aortic Aneurysm (AAA) Screening:    Risk factors include: age between 73-69 yo and tobacco use      Due for: Screening AAA Ultrasound      Lung Cancer Screening:     General: Screening Not Indicated      Hepatitis C Screening:      Hep C Screening Accepted:  Yes Screening, Brief Intervention, and Referral to Treatment (SBIRT)    Screening      AUDIT-C Screenin) How often did you have a drink containing alcohol in the past year? monthly or less  2) How many drinks did you have on a typical day when you were drinking in the past year? 1 to 2  3) How often did you have 6 or more drinks on one occasion in the past year? less than monthly    AUDIT-C Score: 2  Interpretation: Score 0-3 (male): Negative screen for alcohol misuse    Single Item Drug Screening:  How often have you used an illegal drug (including marijuana) or a prescription medication for non-medical reasons in the past year? never    Single Item Drug Screen Score: 0  Interpretation: Negative screen for possible drug use disorder    No results found  Physical Exam:     /84   Pulse 78   Temp 98 °F (36 7 °C)   Resp 15   Ht 5' 11" (1 803 m)   Wt 89 2 kg (196 lb 9 6 oz)   SpO2 98%   BMI 27 42 kg/m²     Physical Exam  Vitals and nursing note reviewed  Constitutional:       General: He is not in acute distress  Appearance: He is well-developed  HENT:      Head: Normocephalic and atraumatic  Eyes:      Conjunctiva/sclera: Conjunctivae normal    Cardiovascular:      Rate and Rhythm: Normal rate and regular rhythm  Heart sounds: No murmur heard  Pulmonary:      Effort: Pulmonary effort is normal  No respiratory distress  Breath sounds: Normal breath sounds  Abdominal:      Palpations: Abdomen is soft  Tenderness: There is no abdominal tenderness  Musculoskeletal:         General: No swelling  Cervical back: Neck supple  Skin:     General: Skin is warm and dry  Capillary Refill: Capillary refill takes less than 2 seconds  Neurological:      General: No focal deficit present  Mental Status: He is alert     Psychiatric:         Mood and Affect: Mood normal           Darline Leavitt MD

## 2023-01-06 NOTE — ASSESSMENT & PLAN NOTE
History of right basal ganglia lacunar infarct in 2020 advise baby aspirin check lipid profile LDL if needed start statin

## 2023-01-07 PROBLEM — N18.31 STAGE 3A CHRONIC KIDNEY DISEASE (HCC): Status: ACTIVE | Noted: 2023-01-07

## 2023-01-07 PROBLEM — E78.2 MIXED HYPERLIPIDEMIA: Status: ACTIVE | Noted: 2023-01-07

## 2023-01-07 NOTE — ASSESSMENT & PLAN NOTE
Lab Results   Component Value Date    LDLCALC 166 (H) 01/06/2023   Patient recommended statin reluctant wants to cry diet exercise low-fat low-cholesterol diet counseling done

## 2023-01-09 ENCOUNTER — OFFICE VISIT (OUTPATIENT)
Dept: OTOLARYNGOLOGY | Facility: CLINIC | Age: 68
End: 2023-01-09

## 2023-01-09 ENCOUNTER — OFFICE VISIT (OUTPATIENT)
Dept: AUDIOLOGY | Facility: CLINIC | Age: 68
End: 2023-01-09

## 2023-01-09 VITALS — HEIGHT: 71 IN | TEMPERATURE: 97.4 F | BODY MASS INDEX: 27.72 KG/M2 | WEIGHT: 198 LBS

## 2023-01-09 DIAGNOSIS — H90.3 SENSORINEURAL HEARING LOSS (SNHL), BILATERAL: ICD-10-CM

## 2023-01-09 DIAGNOSIS — H93.13 TINNITUS OF BOTH EARS: Primary | ICD-10-CM

## 2023-01-09 DIAGNOSIS — H90.3 SENSORINEURAL HEARING LOSS (SNHL), BILATERAL: Primary | ICD-10-CM

## 2023-01-09 LAB
EST. AVERAGE GLUCOSE BLD GHB EST-MCNC: 105 MG/DL
HBA1C MFR BLD: 5.3 %

## 2023-01-09 NOTE — PATIENT INSTRUCTIONS
Tinnitus and possible causes including medications, viral illness, inner ear disorder, TMJ syndrome, or hearing changes  Options for bilateral tinnitus including adding background noise, tinnitus retraining therapy, masking device, Resound tinnitus miguel, Acupuncture, or acceptance  Limit caffeine and ibuprofen intake  Discouraged q-tip use due to damage of eac hair for sound conduction  Consider Flonase daily for eustachian tube dysfunction  No specific medications or surgery indicated for treatment of tinnitus

## 2023-01-09 NOTE — ASSESSMENT & PLAN NOTE
No significant findings on exam   No cerumen impaction, no serous fluid  Offered audiogram, pt noted hearing is decreased    Audiogram indicating bilateral high frequency SNHL mild sloping to moderately severe  Tymps type A bilaterally, word discrimination 90% on right and 100% on left  Tinnitus most bothersome during the night  Reviewed nature of tinnitus and possible causes including medications, viral illness, inner ear disorder, TMJ syndrome, or hearing changes  Discussed options for bilateral tinnitus including adding background noise, tinnitus retraining therapy, masking device, Resound tinnitus miguel, Acupuncture, or acceptance  Limit caffeine and ibuprofen intake  Discouraged q-tip use due to damage of eac hair for sound conduction  Consider Flonase daily for eustachian tube dysfunction  No specific medications or surgery indicated for treatment of tinnitus  Offered imaging if worsens  Offered options for bilateral SNHL including acceptance, lifestyle changes such as moving closer to those who are speaking, or hearing amplification  He would qualify for hearing amplification based on audiogram   Discussed hearing aid options including facilities to obtain a hearing aid from as well as costs and benefits    Discussed that quality of life may improve with hearing amplification

## 2023-01-09 NOTE — PROGRESS NOTES
Assessment/Plan:    Tinnitus of both ears  No significant findings on exam   No cerumen impaction, no serous fluid  Offered audiogram, pt noted hearing is decreased    Audiogram indicating bilateral high frequency SNHL mild sloping to moderately severe  Tymps type A bilaterally, word discrimination 90% on right and 100% on left  Tinnitus most bothersome during the night  Reviewed nature of tinnitus and possible causes including medications, viral illness, inner ear disorder, TMJ syndrome, or hearing changes  Discussed options for bilateral tinnitus including adding background noise, tinnitus retraining therapy, masking device, Resound tinnitus miguel, Acupuncture, or acceptance  Limit caffeine and ibuprofen intake  Discouraged q-tip use due to damage of eac hair for sound conduction  Consider Flonase daily for eustachian tube dysfunction  No specific medications or surgery indicated for treatment of tinnitus  Offered imaging if worsens  Offered options for bilateral SNHL including acceptance, lifestyle changes such as moving closer to those who are speaking, or hearing amplification  He would qualify for hearing amplification based on audiogram   Discussed hearing aid options including facilities to obtain a hearing aid from as well as costs and benefits  Discussed that quality of life may improve with hearing amplification           Diagnoses and all orders for this visit:    Tinnitus of both ears  -     Ambulatory Referral to Otolaryngology  -     Ambulatory referral to Audiology    Sensorineural hearing loss (SNHL), bilateral          Subjective:      Patient ID: Lissy Srevin is a 78 y o  male  Presents today as a new patient due to ear concerns  Hearing gradually worsening  Difficulty hearing at times, works dispatch and mis hears words  Bilateral tinnitus (ringing) since 2020  No otalgia or otorrhea  No history of ear surgery  No current hearing aids    History of q-tip injury to right ear that caused bleeding  The following portions of the patient's history were reviewed and updated as appropriate: allergies, current medications, past family history, past medical history, past social history, past surgical history and problem list     Review of Systems   Constitutional: Negative  HENT: Positive for tinnitus  Negative for congestion, ear discharge, ear pain, hearing loss, nosebleeds, postnasal drip, rhinorrhea, sinus pressure, sinus pain, sore throat and voice change  Eyes: Negative  Respiratory: Negative for chest tightness and shortness of breath  Cardiovascular: Negative  Gastrointestinal: Negative  Endocrine: Negative  Musculoskeletal: Negative  Skin: Negative for color change  Neurological: Negative for dizziness, numbness and headaches  Psychiatric/Behavioral: Negative  Objective:      Temp (!) 97 4 °F (36 3 °C) (Temporal)   Ht 5' 11" (1 803 m)   Wt 89 8 kg (198 lb)   BMI 27 62 kg/m²          Physical Exam  Constitutional:       Appearance: He is well-developed  HENT:      Head: Normocephalic  Right Ear: Hearing, tympanic membrane, ear canal and external ear normal  No decreased hearing noted  No drainage or tenderness  Tympanic membrane is not perforated or erythematous  Left Ear: Hearing, tympanic membrane, ear canal and external ear normal  No decreased hearing noted  No drainage or tenderness  Tympanic membrane is not perforated or erythematous  Nose: Nose normal  No nasal deformity or septal deviation  Mouth/Throat:      Mouth: Mucous membranes are not pale and not dry  No oral lesions  Dentition: Normal dentition  Pharynx: Uvula midline  No oropharyngeal exudate  Neck:      Trachea: No tracheal deviation  Cardiovascular:      Rate and Rhythm: Normal rate  Pulmonary:      Effort: Pulmonary effort is normal  No accessory muscle usage or respiratory distress     Musculoskeletal:      Right shoulder: Normal range of motion  Cervical back: Full passive range of motion without pain, normal range of motion and neck supple  Lymphadenopathy:      Cervical: No cervical adenopathy  Skin:     General: Skin is warm and dry  Neurological:      Mental Status: He is alert and oriented to person, place, and time  Cranial Nerves: No cranial nerve deficit  Sensory: No sensory deficit  Psychiatric:         Behavior: Behavior is cooperative

## 2023-01-09 NOTE — PROGRESS NOTES
ADULT ENT HEARING EVALUATION - [place] [address] AUDIOLOGY      Patient Name: Gabo Gutierrez   MRN:  [id number]   :  1955   Age: 78 y o  Gender: male   DOS: 2023     HISTORY:     Gabo Gutierrez, a 78 y o  male, was seen on 2023 at the referral of EMILY Castorena,  for an evaluation of hearing as part of his ENT visit  Mr Boston Beach primary complaint is bilateral hearing loss and tinnitus  He denied otalgia, otorrhea, aural fullness and dizziness  Mr Norberto Meigs has not had his hearing tested previously  RESULTS:    Otoscopic Evaluation:   Right Ear: Unremarkable, canal clear   Left Ear: Unremarkable, canal clear    Tympanometry:   Right Ear: Type A; normal middle ear pressure and static compliance    Left Ear: Type A; normal middle ear pressure and static compliance     Audiometry:  Conventional pure tone audiometry from 250 - 8000 Hz  obtained with good reliability and revealed the following:     Right Ear: normal to moderate sensorineural hearing loss (SNHL)   Left Ear: normal to moderate sensorineural hearing loss (SNHL)     Speech Audiometry:    Speech Reception (SRT)   Right Ear: 15 dB HL   Left Ear: 15 dB HL    Word Recognition Scores (WRS):  Right Ear: excellent (90 % correct)     Left Ear: excellent (100 % correct)   Stimuli: [id number]    *see attached audiogram*      RECOMMENDATIONS:    1 ) Follow-up with referring provider  2 ) Based on today's findings and patient symptoms, Mr Norberto Meigs is a candidate for a trial with amplification  A hearing aid evaluation to further discuss Mr [place] Beach lifestyle, communication needs, and develop a treatment plan for their hearing loss is recommended  It was a pleasure working with Mr Norberto Meigs today  Thank you for referring this patient  Newt Phoenix, AuD    Clinical Audiologist    [address] [place] [phone]

## 2023-01-10 PROBLEM — H90.3 SENSORINEURAL HEARING LOSS (SNHL), BILATERAL: Status: ACTIVE | Noted: 2023-01-10

## 2023-03-06 ENCOUNTER — TELEPHONE (OUTPATIENT)
Dept: GASTROENTEROLOGY | Facility: CLINIC | Age: 68
End: 2023-03-06

## 2023-03-07 PROBLEM — Z00.00 ENCOUNTER FOR SUBSEQUENT ANNUAL WELLNESS VISIT (AWV) IN MEDICARE PATIENT: Status: RESOLVED | Noted: 2023-01-06 | Resolved: 2023-03-07

## 2023-03-15 ENCOUNTER — APPOINTMENT (OUTPATIENT)
Dept: LAB | Facility: CLINIC | Age: 68
End: 2023-03-15

## 2023-03-20 ENCOUNTER — HOSPITAL ENCOUNTER (OUTPATIENT)
Dept: GASTROENTEROLOGY | Facility: AMBULARY SURGERY CENTER | Age: 68
Setting detail: OUTPATIENT SURGERY
Discharge: HOME | End: 2023-03-20
Attending: INTERNAL MEDICINE

## 2023-03-20 ENCOUNTER — ANESTHESIA (OUTPATIENT)
Dept: GASTROENTEROLOGY | Facility: AMBULARY SURGERY CENTER | Age: 68
End: 2023-03-20

## 2023-03-20 ENCOUNTER — ANESTHESIA EVENT (OUTPATIENT)
Dept: GASTROENTEROLOGY | Facility: AMBULARY SURGERY CENTER | Age: 68
End: 2023-03-20

## 2023-03-20 ENCOUNTER — APPOINTMENT (OUTPATIENT)
Dept: LAB | Facility: CLINIC | Age: 68
End: 2023-03-20

## 2023-03-20 VITALS
TEMPERATURE: 97.5 F | RESPIRATION RATE: 18 BRPM | SYSTOLIC BLOOD PRESSURE: 117 MMHG | OXYGEN SATURATION: 98 % | DIASTOLIC BLOOD PRESSURE: 86 MMHG | HEART RATE: 81 BPM

## 2023-03-20 DIAGNOSIS — K22.81 ESOPHAGEAL POLYP: ICD-10-CM

## 2023-03-20 DIAGNOSIS — K25.9 GASTRIC ULCER, UNSPECIFIED CHRONICITY, UNSPECIFIED WHETHER GASTRIC ULCER HEMORRHAGE OR PERFORATION PRESENT: ICD-10-CM

## 2023-03-20 DIAGNOSIS — K31.A11 INTESTINAL METAPLASIA OF ANTRUM OF STOMACH WITHOUT DYSPLASIA: ICD-10-CM

## 2023-03-20 DIAGNOSIS — K21.00 GASTROESOPHAGEAL REFLUX DISEASE WITH ESOPHAGITIS WITHOUT HEMORRHAGE: ICD-10-CM

## 2023-03-20 RX ORDER — LIDOCAINE HYDROCHLORIDE 20 MG/ML
INJECTION, SOLUTION EPIDURAL; INFILTRATION; INTRACAUDAL; PERINEURAL AS NEEDED
Status: DISCONTINUED | OUTPATIENT
Start: 2023-03-20 | End: 2023-03-20

## 2023-03-20 RX ORDER — SODIUM CHLORIDE, SODIUM LACTATE, POTASSIUM CHLORIDE, CALCIUM CHLORIDE 600; 310; 30; 20 MG/100ML; MG/100ML; MG/100ML; MG/100ML
INJECTION, SOLUTION INTRAVENOUS CONTINUOUS PRN
Status: DISCONTINUED | OUTPATIENT
Start: 2023-03-20 | End: 2023-03-20

## 2023-03-20 RX ORDER — PROPOFOL 10 MG/ML
INJECTION, EMULSION INTRAVENOUS AS NEEDED
Status: DISCONTINUED | OUTPATIENT
Start: 2023-03-20 | End: 2023-03-20

## 2023-03-20 RX ADMIN — PROPOFOL 50 MG: 10 INJECTION, EMULSION INTRAVENOUS at 11:16

## 2023-03-20 RX ADMIN — PROPOFOL 50 MG: 10 INJECTION, EMULSION INTRAVENOUS at 11:18

## 2023-03-20 RX ADMIN — PROPOFOL 130 MG: 10 INJECTION, EMULSION INTRAVENOUS at 11:13

## 2023-03-20 RX ADMIN — SODIUM CHLORIDE, SODIUM LACTATE, POTASSIUM CHLORIDE, AND CALCIUM CHLORIDE: .6; .31; .03; .02 INJECTION, SOLUTION INTRAVENOUS at 10:44

## 2023-03-20 RX ADMIN — LIDOCAINE HYDROCHLORIDE 100 MG: 20 INJECTION, SOLUTION EPIDURAL; INFILTRATION; INTRACAUDAL; PERINEURAL at 11:13

## 2023-03-20 NOTE — ANESTHESIA PREPROCEDURE EVALUATION
Procedure:  EGD    Relevant Problems   ANESTHESIA (within normal limits)      CARDIO   (+) Mixed hyperlipidemia      ENDO (within normal limits)      GI/HEPATIC   (+) Chronic gastric ulcer without hemorrhage and without perforation   (+) Gastroesophageal reflux disease with esophagitis without hemorrhage      /RENAL   (+) Stage 3a chronic kidney disease (HCC)      NEURO/PSYCH   (+) Atherosclerosis of native arteries of extremities with intermittent claudication, bilateral legs (HCC)   (+) Headaches, cluster   (+) Hx of adenomatous colonic polyps      PULMONARY (within normal limits)        Physical Exam    Airway    Mallampati score: II  TM Distance: >3 FB  Neck ROM: full     Dental   No notable dental hx     Cardiovascular      Pulmonary      Other Findings        Anesthesia Plan  ASA Score- 2     Anesthesia Type- IV sedation with anesthesia with ASA Monitors  Additional Monitors:   Airway Plan:           Plan Factors-Exercise tolerance (METS): >4 METS  Chart reviewed  EKG reviewed  Existing labs reviewed  Patient summary reviewed  Patient is not a current smoker  Induction-     Postoperative Plan-     Informed Consent- Anesthetic plan and risks discussed with patient  I personally reviewed this patient with the CRNA  Discussed and agreed on the Anesthesia Plan with the CRNA  Oliver Ley

## 2023-03-20 NOTE — H&P
History and Physical - [facility]  Adrianna Del Rio 80 y o  male MRN: [id number]                  HPI: Adrianna Del Rio is a 80y o  year old male who presents for EGD due to gastroesophageal reflux history of gastric ulcers, squamous papilloma of the esophagus      REVIEW OF SYSTEMS: Per the HPI, and otherwise unremarkable  Historical Information   Past Medical History:   Diagnosis Date   • Cataract    • Colon polyp    • Dizziness    • Ear problems 2020   • GERD (gastroesophageal reflux disease)    • Hyperlipidemia    • Lightheadedness    • Seasonal allergies    • Tinnitus    • Wears glasses      Past Surgical History:   Procedure Laterality Date   • COLONOSCOPY     • AR COLONOSCOPY FLX DX W/COLLJ SPEC WHEN PFRMD N/A 2016    Procedure: COLONOSCOPY;  Surgeon: Graig Spurling, MD;  Location: [facility] GI LAB; Service: Gastroenterology   • AR EGD TRANSORAL BIOPSY SINGLE/MULTIPLE N/A 2016    Procedure: ESOPHAGOGASTRODUODENOSCOPY (EGD); Surgeon: Graig Spurling, MD;  Location: [facility] GI LAB;   Service: Gastroenterology   • TONSILLECTOMY N/A     child     Social History   Social History     Substance and Sexual Activity   Alcohol Use Yes    Comment: on occ     Social History     Substance and Sexual Activity   Drug Use No     Social History     Tobacco Use   Smoking Status Former   • Types: Cigarettes   • Quit date: 2001   • Years since quittin 0   Smokeless Tobacco Never     Family History   Problem Relation Age of Onset   • Arthritis Mother    • Alzheimer's disease Mother    • Other Mother         smoker-phlegm   • Alcohol abuse Father    • Suicidality Father        Meds/Allergies       Current Outpatient Medications:   •  tamsulosin (FLOMAX) 0 4 mg  •  cholecalciferol (VITAMIN D3) 1,000 units tablet  •  Cyanocobalamin (VITAMIN B 12 PO)  •  VITAMIN E PO    Allergies   Allergen Reactions   • Penicillins Other (See Comments)     dizziness       Objective     /72   Pulse (!) 54   Temp 97 5 °F (36 4 °C) (Temporal)   Resp 16   SpO2 97%       PHYSICAL EXAM    Gen: NAD  Head: NCAT  CV: RRR  CHEST: Clear  ABD: soft, NT/ND  EXT: no edema      ASSESSMENT/PLAN:  This is a 80y o  year old male here for EGD, and he is stable and optimized for his procedure

## 2023-03-20 NOTE — ANESTHESIA POSTPROCEDURE EVALUATION
Post-Op Assessment Note    CV Status:  Stable  Pain Score: 0    Pain management: adequate     Mental Status:  Sleepy and arousable   Hydration Status:  Stable   PONV Controlled:  None   Airway Patency:  Patent and adequate      Post Op Vitals Reviewed: Yes      Staff: CRNA         No notable events documented      BP      Temp      Pulse    Resp      SpO2

## 2023-03-23 NOTE — RESULT ENCOUNTER NOTE
Patient was informed via my chart biopsies were benign  Repeat EGD 3 years due to intestinal metaplasia of the antrum, reflux, squamous papillomas of the esophagus

## 2023-04-03 ENCOUNTER — OFFICE VISIT (OUTPATIENT)
Dept: INTERNAL MEDICINE CLINIC | Facility: CLINIC | Age: 68
End: 2023-04-03

## 2023-04-03 VITALS
OXYGEN SATURATION: 97 % | SYSTOLIC BLOOD PRESSURE: 120 MMHG | HEART RATE: 60 BPM | DIASTOLIC BLOOD PRESSURE: 80 MMHG | WEIGHT: 201 LBS | HEIGHT: 71 IN | RESPIRATION RATE: 16 BRPM | BODY MASS INDEX: 28.14 KG/M2 | TEMPERATURE: 98 F

## 2023-04-03 DIAGNOSIS — E78.2 MIXED HYPERLIPIDEMIA: ICD-10-CM

## 2023-04-03 DIAGNOSIS — I63.81 LACUNAR INFARCT, ACUTE (HCC): ICD-10-CM

## 2023-04-03 DIAGNOSIS — N18.31 STAGE 3A CHRONIC KIDNEY DISEASE (HCC): ICD-10-CM

## 2023-04-03 DIAGNOSIS — H90.3 SENSORINEURAL HEARING LOSS (SNHL), BILATERAL: ICD-10-CM

## 2023-04-03 DIAGNOSIS — E55.9 VITAMIN D DEFICIENCY: ICD-10-CM

## 2023-04-03 DIAGNOSIS — K21.00 GASTROESOPHAGEAL REFLUX DISEASE WITH ESOPHAGITIS WITHOUT HEMORRHAGE: ICD-10-CM

## 2023-04-03 DIAGNOSIS — I70.213 ATHEROSCLEROSIS OF NATIVE ARTERIES OF EXTREMITIES WITH INTERMITTENT CLAUDICATION, BILATERAL LEGS (HCC): Primary | ICD-10-CM

## 2023-04-03 DIAGNOSIS — G47.39 OTHER SLEEP APNEA: ICD-10-CM

## 2023-04-03 RX ORDER — ERGOCALCIFEROL 1.25 MG/1
50000 CAPSULE ORAL WEEKLY
Qty: 12 CAPSULE | Refills: 0 | Status: SHIPPED | OUTPATIENT
Start: 2023-04-03

## 2023-04-03 RX ORDER — ATORVASTATIN CALCIUM 40 MG/1
40 TABLET, FILM COATED ORAL DAILY
Qty: 90 TABLET | Refills: 1 | Status: SHIPPED | OUTPATIENT
Start: 2023-04-03

## 2023-04-03 NOTE — ASSESSMENT & PLAN NOTE
Lab Results   Component Value Date    EGFR 58 01/06/2023    EGFR 64 11/02/2020    EGFR 59 11/01/2020    CREATININE 1 27 01/06/2023    CREATININE 1 20 11/02/2020    CREATININE 1 27 11/01/2020   The GFR creatinine stable at baseline GFR around 59 blood pressure controlled avoid nephrotoxic  GFR is baseline  Creat is baseline  Recommend periodic blood test for monitoring of BUN and Creat  Avoid Non Steroidal Antiinflammatory such as ibuprofen, aleve etc   Potassium, HCO3 and calcium are wnl and will require periodic blood test   Bone and Mineral disease monitoring as appropriate    All patient with GFR less than 30( CKD 4 or 5 or 6) advised to follow with nephrologist v

## 2023-04-03 NOTE — PATIENT INSTRUCTIONS
Hyperlipidemia   AMBULATORY CARE:   Hyperlipidemia  is a high level of lipids (fats) in your blood  These lipids include cholesterol or triglycerides  Lipids are made by your body  They also come from the foods you eat  Your body needs lipids to work properly, but high levels increase your risk for heart disease, heart attack, and stroke  Call your local emergency number (911 in the [address] [facility] [facility]) or have someone call if:   You have any of the following signs of a heart attack:      Squeezing, pressure, or pain in your chest    You may  also have any of the following:     Discomfort or pain in your back, neck, jaw, stomach, or arm    Shortness of breath    Nausea or vomiting    Lightheadedness or a sudden cold sweat    You have any of the following signs of a stroke:      Numbness or drooping on one side of your face     Weakness in an arm or leg    Confusion or difficulty speaking    Dizziness, a severe headache, or vision loss    Call your doctor if:   You have questions or concerns about your condition or care  Treatment  may first include lifestyle changes to help decrease your lipid levels  Your provider may recommend you work with a team to manage hyperlipidemia  The team may include medical experts such as a dietitian, an exercise or physical therapist, and a behavior therapist  Your family members may be included in helping you create lifestyle changes  You may also need to take medicine to lower your lipid levels  Some of the lifestyle changes you may need to make include the following:  Maintain a healthy weight  Ask your healthcare provider what a healthy weight is for you  Ask him or her to help you create a weight loss plan if you are overweight  Weight loss can decrease your cholesterol and triglyceride levels  Be physically active throughout the day  Physical activity, such as exercise, lowers your cholesterol levels and helps you maintain a healthy weight   Get 30 minutes or more of aerobic exercise 4 to 6 days each week  You can split your exercise into four 10-minute workouts instead of 30 minutes at one time  Examples of aerobic exercises include walking briskly, swimming, or riding a bike  Work with your healthcare provider to plan the best exercise program for you  Also include strength training at least 2 times each week  Your healthcare providers can help you create a physical activity plan  Do not smoke  Nicotine and other chemicals in cigarettes and cigars can increase your risk for a heart attack and stroke  Ask your healthcare provider for information if you currently smoke and need help to quit  E-cigarettes or smokeless tobacco still contain nicotine  Talk to your healthcare provider before you use these products  Eat heart-healthy foods  A dietitian or your provider can give you more information on low-sodium plans or the DASH (Dietary Approaches to Stop Hypertension) eating plan  The DASH plan is low in sodium, processed sugar, unhealthy fats, and total fat  It is high in potassium, calcium, and fiber  It is high in potassium, calcium, and fiber  These can be found in vegetables, fruit, and whole-grain foods  The following are ways to get more heart-healthy foods:         Decrease the total amount of fat you eat  Choose lean meats, fat-free or 1% fat milk, and low-fat dairy products, such as yogurt and cheese  Limit or do not eat red meat  Red meats are high in fat and cholesterol  Replace unhealthy fats with healthy fats  Unhealthy fats include saturated fat, trans fat, and cholesterol  Choose soft margarines that are low in saturated fat and have little or no trans fat  Monounsaturated fats are healthy fats  These are found in olive oil, canola oil, avocado, and nuts  Polyunsaturated fats are also healthy  These are found in fish, flaxseed, walnuts, and soybeans  Eat 5 or more servings of fruits and vegetables every day    They are low in calories and fat and a good source of essential vitamins  Include dark green, red, and orange vegetables  Examples include spinach, kale, broccoli, and carrots  Eat foods high in fiber  Fiber can help lower your cholesterol levels  Choose whole grain, high-fiber foods  Good choices include whole-wheat breads or cereals, beans, peas, fruits, and vegetables  Limit sodium (salt) as directed  Too much sodium can affect your fluid balance and blood pressure  Your healthcare provider will tell you how much sodium and potassium are safe for you to have in a day  He or she may recommend that you limit sodium to 2,300 mg a day  Your provider or a dietitian can help you find ways to limit sodium  For example, if you add salt while you cook, do not add more salt at the table  Check labels to find low-sodium or no-salt-added foods  Some low-sodium foods use potassium salts for flavor  Too much potassium can also cause health problems  Ask your healthcare provider if it is okay for you to drink alcohol  Alcohol can increase your cholesterol and triglyceride levels  Your provider can tell you how many drinks are okay to have within 24 hours and within 1 week  A drink of alcohol is 12 ounces of beer, 5 ounces of wine, or 1½ ounces of liquor  Follow up with your doctor as directed: You may need to return for more tests  Your healthcare provider may refer you to a dietitian  Write down your questions so you remember to ask them during your visits  © Copyright [employer] 2022 Information is for End User's use only and may not be sold, redistributed or otherwise used for commercial purposes  The above information is an  only  It is not intended as medical advice for individual conditions or treatments  Talk to your doctor, nurse or pharmacist before following any medical regimen to see if it is safe and effective for you

## 2023-04-03 NOTE — ASSESSMENT & PLAN NOTE
Complaint snoring daytime sleepiness morning headaches we will check for sleep apnea referred to pulmonary need sleep study

## 2023-06-21 ENCOUNTER — CONSULT (OUTPATIENT)
Dept: PULMONOLOGY | Facility: MEDICAL CENTER | Age: 68
End: 2023-06-21
Payer: COMMERCIAL

## 2023-06-21 VITALS
HEIGHT: 71 IN | SYSTOLIC BLOOD PRESSURE: 106 MMHG | OXYGEN SATURATION: 98 % | RESPIRATION RATE: 12 BRPM | DIASTOLIC BLOOD PRESSURE: 74 MMHG | HEART RATE: 65 BPM | TEMPERATURE: 97.1 F | BODY MASS INDEX: 27.44 KG/M2 | WEIGHT: 196 LBS

## 2023-06-21 DIAGNOSIS — G47.39 OTHER SLEEP APNEA: ICD-10-CM

## 2023-06-21 DIAGNOSIS — R06.83 SNORING: ICD-10-CM

## 2023-06-21 DIAGNOSIS — G47.10 HYPERSOMNIA: Primary | ICD-10-CM

## 2023-06-21 PROCEDURE — 99203 OFFICE O/P NEW LOW 30 MIN: CPT | Performed by: INTERNAL MEDICINE

## 2023-06-21 RX ORDER — ALFUZOSIN HYDROCHLORIDE 10 MG/1
10 TABLET, EXTENDED RELEASE ORAL DAILY
COMMUNITY
Start: 2023-05-16

## 2023-06-21 NOTE — ASSESSMENT & PLAN NOTE
Eva Diaz does have daytime fatigue and this is evidenced by [place] sleepiness score of 13  Also feels his sleep is unrefreshing at times and he does snore  Mallampati score is 3  Possibly could have obstructive sleep apnea  We discussed the diagnosis and treatment of ZENY  I did order an in lab sleep study for him

## 2023-06-21 NOTE — PROGRESS NOTES
"Assessment/Plan:       Problem List Items Addressed This Visit        Respiratory    Other sleep apnea       Other    Hypersomnia - Primary     Bard Rodriguez does have daytime fatigue and this is evidenced by [place] sleepiness score of 13  Also feels his sleep is unrefreshing at times and he does snore  Mallampati score is 3  Possibly could have obstructive sleep apnea  We discussed the diagnosis and treatment of ZENY  I did order an in lab sleep study for him  Relevant Orders    Diagnostic Sleep Study    Snoring     Bard Rodriguez does have history of snoring  Plan for follow up:  Return if symptoms worsen or fail to improve  All questions are answered to the patient's satisfaction and understanding  He verbalizes understanding  He is encouraged to call with any further questions or concerns  Portions of the record may have been created with voice recognition software  Occasional wrong word or \"sound a like\" substitutions may have occurred due to the inherent limitations of voice recognition software  Read the chart carefully and recognize, using context, where substitutions have occurred  a    Electronically Signed by Felipa Riggs DO    ______________________________________________________________________    Chief Complaint: daytime fatigue       Patient ID: Bard Rodriguez is a 80 y o  y o  male has a past medical history of Cataract, Colon polyp, Dizziness, Ear problems (2020), GERD (gastroesophageal reflux disease), Hyperlipidemia, Lightheadedness, Seasonal allergies, Tinnitus, and Wears glasses  6/21/2023  Patient presents today for initial visit for daytime fatigue    HPI       Bard Rodriguez is 80years old and presents for evaluation for possible sleep disordered breathing  He states he does snore and he does feel like his sleep is unrefreshing  He generally sleeps about 5 to 6 hours per night  Does not feel like he will fall asleep driving  Does have fatigue throughout the day    Does not wake up " gasping for air  Does have some headaches in the morning  Does have history of GERD  Hyperlipidemia and seasonal allergies  No history of heart disease, asthma, stroke or, thyroid disease  No history of hypertension but he does have chronic kidney disease stage III    Has not had any prior surgeries  He does not have any shortness of breath activity  He quit smoking in 2001 and smoked about a pack of cigarettes per day for 13 years  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 [address]      Review of Systems   Constitutional: Positive for fatigue  Negative for chills, fever and unexpected weight change  HENT: Negative for congestion, rhinorrhea and sore throat  Eyes: Negative for discharge and redness  Cardiovascular: Negative for chest pain, palpitations and leg swelling  Gastrointestinal: Negative for abdominal distention, abdominal pain and nausea  Endocrine: Negative for polydipsia and polyphagia  Genitourinary: Negative for dysuria  Musculoskeletal: Negative for joint swelling and myalgias  Skin: Negative for rash  Neurological: Negative for light-headedness  Psychiatric/Behavioral: Positive for confusion  Social history: He reports that he quit smoking about 22 years ago  His smoking use included cigarettes  He has a 15 00 pack-year smoking history  He has never used smokeless tobacco  He reports current alcohol use  He reports that he does not use drugs  Past surgical history:   Past Surgical History:   Procedure Laterality Date   • COLONOSCOPY     • OR COLONOSCOPY FLX DX W/COLLJ SPEC WHEN PFRMD N/A 2/29/2016    Procedure: COLONOSCOPY;  Surgeon: Melvin Kaur MD;  Location: [place] [address] GI LAB; Service: Gastroenterology   • OR EGD TRANSORAL BIOPSY SINGLE/MULTIPLE N/A 2/29/2016    Procedure: ESOPHAGOGASTRODUODENOSCOPY (EGD); Surgeon: Melvin Kaur MD;  Location: [facility] GI LAB;   Service: Gastroenterology   • TONSILLECTOMY N/A     child     Family "history:   Family History   Problem Relation Age of Onset   • Arthritis Mother    • Alzheimer's disease Mother    • Other Mother         smoker-phlegm   • Alcohol abuse Father    • Suicidality Father        Immunization History   Administered Date(s) Administered   • COVID-19 MODERNA VACC 0 25 ML IM BOOSTER 12/07/2021   • COVID-19 MODERNA VACC 0 5 ML IM 05/05/2021, 06/07/2021     Current Outpatient Medications   Medication Sig Dispense Refill   • Cyanocobalamin (VITAMIN B 12 PO) Take by mouth in the morning     • ergocalciferol (VITAMIN D2) 50,000 units Take 1 capsule (50,000 Units total) by mouth once a week 12 capsule 0   • VITAMIN E PO Take by mouth in the morning     • alfuzosin (UROXATRAL) 10 mg 24 hr tablet Take 10 mg by mouth daily     • atorvastatin (LIPITOR) 40 mg tablet Take 1 tablet (40 mg total) by mouth daily (Patient not taking: Reported on 6/21/2023) 90 tablet 1   • cholecalciferol (VITAMIN D3) 1,000 units tablet Take 1,000 Units by mouth daily (Patient not taking: Reported on 6/21/2023)     • tamsulosin (FLOMAX) 0 4 mg Take 0 4 mg by mouth daily with dinner (Patient not taking: Reported on 6/21/2023)       No current facility-administered medications for this visit  Allergies: Penicillins    Objective:  Vitals:    06/21/23 1415   BP: 106/74   BP Location: Right arm   Patient Position: Sitting   Cuff Size: Standard   Pulse: 65   Resp: 12   Temp: (!) 97 1 °F (36 2 °C)   TempSrc: Temporal   SpO2: 98%   Weight: 88 9 kg (196 lb)   Height: 5' 11\" (1 803 m)   Oxygen Therapy  SpO2: 98 %    Wt Readings from Last 3 Encounters:   06/21/23 88 9 kg (196 lb)   04/03/23 91 2 kg (201 lb)   03/16/23 91 2 kg (201 lb)     Body mass index is 27 34 kg/m²  Physical Exam  Vitals reviewed  Constitutional:       General: He is not in acute distress  Appearance: Normal appearance  He is well-developed  HENT:      Head: Normocephalic        Right Ear: External ear normal       Left Ear: External ear normal       " Nose: Nose normal       Mouth/Throat:      Mouth: Mucous membranes are moist       Pharynx: Oropharynx is clear  No oropharyngeal exudate  Comments: Mallampati score is 3  Eyes:      Conjunctiva/sclera: Conjunctivae normal       Pupils: Pupils are equal, round, and reactive to light  Neck:      Vascular: No JVD  Trachea: No tracheal deviation  Cardiovascular:      Rate and Rhythm: Normal rate and regular rhythm  Heart sounds: Normal heart sounds  Pulmonary:      Effort: Pulmonary effort is normal       Comments: Lung sounds are clear  No wheezes, crackles or rhonchi  Abdominal:      General: There is no distension  Palpations: Abdomen is soft  Tenderness: There is no abdominal tenderness  There is no guarding  Musculoskeletal:      Cervical back: Neck supple  Comments: No edema, cyanosis or clubbing   Lymphadenopathy:      Cervical: No cervical adenopathy  Skin:     General: Skin is warm and dry  Findings: No rash  Neurological:      General: No focal deficit present  Mental Status: He is alert and oriented to person, place, and time  Psychiatric:         Behavior: Behavior normal          Thought Content:  Thought content normal          Lab Review:   Lab Results   Component Value Date    K 4 4 01/06/2023     01/06/2023    CO2 29 01/06/2023    BUN 17 01/06/2023    CREATININE 1 27 01/06/2023    CALCIUM 9 4 01/06/2023     Lab Results   Component Value Date    WBC 5 41 01/06/2023    HGB 14 2 01/06/2023    HCT 42 9 01/06/2023    MCV 88 01/06/2023     01/06/2023          ESS: Total score: 13  Neck size 15 5 inches

## 2023-06-21 NOTE — PATIENT INSTRUCTIONS
I ordered a in lab diagnostic sleep study    If you have any problems getting this done and need to change to a home study contact my office    [facility] [phone]

## 2023-11-15 ENCOUNTER — HOSPITAL ENCOUNTER (OUTPATIENT)
Dept: SLEEP CENTER | Facility: CLINIC | Age: 68
Discharge: HOME | End: 2023-11-15
Payer: COMMERCIAL

## 2023-11-15 DIAGNOSIS — G47.10 HYPERSOMNIA: ICD-10-CM

## 2023-11-15 PROCEDURE — 95810 POLYSOM 6/> YRS 4/> PARAM: CPT | Performed by: INTERNAL MEDICINE

## 2023-11-15 PROCEDURE — 95810 POLYSOM 6/> YRS 4/> PARAM: CPT

## 2023-11-16 NOTE — PROGRESS NOTES
Sleep Study Documentation    Pre-Sleep Study       Sleep testing procedure explained to patient:YES    Patient napped prior to study:NO    Caffeine:Dayshift worker after 12PM.  Caffeine use:NO    Alcohol:Dayshift workers after 5PM: Alcohol use:NO    Typical day for patient:YES       Study Documentation    Sleep Study Indications: EDS, Snore, Unrefreshing sleep, Chronic AM headache    Sleep Study: Diagnostic   Snore:Mild  Supplemental O2: no    O2 flow rate (L/min) range NA  O2 flow rate (L/min) final NA  Minimum SaO2 83  Baseline SaO2 95    Mode of Therapy: NA    EKG abnormalities: no     EEG abnormalities: no    Were abnormal behaviors in sleep observed:NO    Is Total Sleep Study Recording Time < 2 hours: N/A    Is Total Sleep Study Recording Time > 2 hours but study is incomplete: N/A    Is Total Sleep Study Recording Time 6 hours or more but sleep was not obtained: NO    Patient classification: employed       Post-Sleep Study    Medication used at bedtime or during sleep study:NO    Patient reports time it took to fall asleep:30 to 60 minutes    Patient reports waking up during study:3 or more times. Patient reports returning to sleep in 10 to 30 minutes. Patient reports sleeping 4 to 6 hours without dreaming. Does the Patient feel this is a typical night of sleep:typical    Patient rated sleepiness: Not sleepy or tired    PAP treatment:no.

## 2023-11-29 ENCOUNTER — TELEPHONE (OUTPATIENT)
Dept: PULMONOLOGY | Facility: MEDICAL CENTER | Age: 68
End: 2023-11-29

## 2024-02-06 ENCOUNTER — OFFICE VISIT (OUTPATIENT)
Dept: PULMONOLOGY | Facility: MEDICAL CENTER | Age: 69
End: 2024-02-06
Payer: COMMERCIAL

## 2024-02-06 VITALS
RESPIRATION RATE: 12 BRPM | SYSTOLIC BLOOD PRESSURE: 118 MMHG | BODY MASS INDEX: 27.72 KG/M2 | DIASTOLIC BLOOD PRESSURE: 70 MMHG | OXYGEN SATURATION: 98 % | WEIGHT: 198 LBS | TEMPERATURE: 97.4 F | HEART RATE: 75 BPM | HEIGHT: 71 IN

## 2024-02-06 DIAGNOSIS — E61.1 IRON DEFICIENCY: ICD-10-CM

## 2024-02-06 DIAGNOSIS — R53.83 OTHER FATIGUE: ICD-10-CM

## 2024-02-06 DIAGNOSIS — N18.31 STAGE 3A CHRONIC KIDNEY DISEASE (HCC): ICD-10-CM

## 2024-02-06 DIAGNOSIS — G47.61 PERIODIC LIMB MOVEMENTS OF SLEEP: Primary | ICD-10-CM

## 2024-02-06 PROCEDURE — 99214 OFFICE O/P EST MOD 30 MIN: CPT | Performed by: INTERNAL MEDICINE

## 2024-02-06 NOTE — PROGRESS NOTES
Assessment/Plan        Problem List Items Addressed This Visit          Genitourinary    Stage 3a chronic kidney disease (HCC)     Lab Results   Component Value Date    EGFR 73 02/07/2024    EGFR 58 01/06/2023    EGFR 64 11/02/2020    CREATININE 1.11 02/07/2024    CREATININE 1.27 01/06/2023    CREATININE 1.20 11/02/2020       I ordered CBC and CMP to evaluate his chronic kidney disease.  This CMP was a year ago and creatinine was 1.27 at that time         Relevant Orders    CBC and differential    Comprehensive metabolic panel       Other    Periodic limb movements of sleep - Primary     He did have diagnostic sleep study in the lab done over 15, 2023.  No evidence of any significant obstructive sleep apnea.  He did have many periodic limb movements but they in general did not cause arousals.  I did order serum iron and ferritin level         Relevant Orders    Iron Panel (Includes Ferritin, Iron Sat%, Iron, and TIBC)    Other fatigue     Does have some mild fatigue at times.  Diagnostic sleep study showed overall AHI to be normal.  Did advise him to follow good sleep hygiene and exercise.  I also ordered CBC         Relevant Orders    Iron Panel (Includes Ferritin, Iron Sat%, Iron, and TIBC)     Other Visit Diagnoses       Iron deficiency        Relevant Orders    Iron Panel (Includes Ferritin, Iron Sat%, Iron, and TIBC)              Follow-up (Sleep results) had diagnostic sleep study done late November      General  Associated symptoms include headaches. Pertinent negatives include no chest pain or coughing.       Shay does have daytime fatigue.  He did undergo an in lab diagnostic sleep study on November 15, 2023.  This showed overall 17 respiratory events which consisted of 6 obstructive apneas, 5 central apneas and 6 hypopneas for overall AHI 3.7.  In supine position is AHI increased to 14.8 but he had limited sleep time in the supine position of only 16% of total recorded sleep time.  Latency to sleep  onset was normal.  He had 274 minutes of sleep time but sleep efficiency was decreased.  He did have 144 periodic limb movements but PLMA index was only 2.6.  Room air O2 saturation at rest was 95% with jean of 83%.  He did have intermittent snoring    He is not having any difficulty falling asleep.  States he is feeling okay and not having any excessive fatigue.  No nocturnal dyspnea.    He does have history of GERD, hyperlipidemia and seasonal allergies.  Does have chronic kidney disease stage III.    Blood work from June 2023 showed hemoglobin to be 14.2 and read also indices were normal.  Serum creatinine was 1.27 with sodium 139.  Liver enzymes were normal    He is not having any shortness of breath.  Does get some fatigue at times but not severe.  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  He does have to drive about an hour each way and is able to do it without any excessive fatigue.    Sometimes will get some cramping in his legs at bedtime but not on regular basis.  Does not get restless sensation in his legs.      Past Medical History:   Diagnosis Date    Cataract     Colon polyp     Dizziness     Ear problems 2020    GERD (gastroesophageal reflux disease)     Hyperlipidemia     Lightheadedness     Seasonal allergies     Tinnitus     Wears glasses        Past Surgical History:   Procedure Laterality Date    COLONOSCOPY      NC COLONOSCOPY FLX DX W/COLLJ SPEC WHEN PFRMD N/A 2/29/2016    Procedure: COLONOSCOPY;  Surgeon: Gorge Deleon MD;  Location: [facility];  Service: Gastroenterology    NC EGD TRANSORAL BIOPSY SINGLE/MULTIPLE N/A 2/29/2016    Procedure: ESOPHAGOGASTRODUODENOSCOPY (EGD);  Surgeon: Gorge Deleon MD;  Location: [facility];  Service: Gastroenterology    TONSILLECTOMY N/A     child         Current Outpatient Medications:     cholecalciferol (VITAMIN D3) 1,000 units tablet, Take 1,000 Units by mouth daily, Disp: , Rfl:     Cyanocobalamin (VITAMIN B 12 PO),  "Take by mouth in the morning, Disp: , Rfl:     VITAMIN E PO, Take by mouth in the morning, Disp: , Rfl:     alfuzosin (UROXATRAL) 10 mg 24 hr tablet, Take 10 mg by mouth daily, Disp: , Rfl:     atorvastatin (LIPITOR) 40 mg tablet, Take 1 tablet (40 mg total) by mouth daily (Patient not taking: Reported on 2023), Disp: 90 tablet, Rfl: 1    ergocalciferol (VITAMIN D2) 50,000 units, Take 1 capsule (50,000 Units total) by mouth once a week, Disp: 12 capsule, Rfl: 0    tamsulosin (FLOMAX) 0.4 mg, Take 0.4 mg by mouth daily with dinner (Patient not taking: Reported on 2023), Disp: , Rfl:     Allergies   Allergen Reactions    Penicillins Other (See Comments)     dizziness       Social History     Tobacco Use    Smoking status: Former     Current packs/day: 0.00     Average packs/day: 0.5 packs/day for 8.2 years (4.1 ttl pk-yrs)     Types: Cigarettes     Start date: 1975     Quit date: 1983     Years since quittin.9    Smokeless tobacco: Never   Substance Use Topics    Alcohol use: Yes     Comment: on occ         Family History   Problem Relation Age of Onset    Arthritis Mother     Alzheimer's disease Mother     Other Mother         smoker-phlegm    Alcohol abuse Father     Suicidality Father        Review of Systems   Constitutional:  Negative for appetite change.   HENT:  Positive for rhinorrhea.    Eyes:  Negative for redness.   Respiratory:  Negative for cough and wheezing.    Cardiovascular:  Negative for chest pain.   Neurological:  Positive for headaches.   Psychiatric/Behavioral:  Negative for decreased concentration.            Vitals:    24 0917   BP: 118/70   Pulse: 75   Resp: 12   Temp: (!) 97.4 °F (36.3 °C)   SpO2: 98%     Height: 5' 11\" (180.3 cm)  IBW (Ideal Body Weight): 75.3 kg  Body mass index is 27.62 kg/m².  Weight (last 2 days)       None                Physical Exam  Vitals reviewed.   Constitutional:       General: He is not in acute distress.     Appearance: Normal " appearance. He is well-developed.   HENT:      Head: Normocephalic.      Right Ear: External ear normal.      Left Ear: External ear normal.      Nose: Nose normal.      Mouth/Throat:      Mouth: Mucous membranes are moist.      Pharynx: Oropharynx is clear. No oropharyngeal exudate.      Comments: Mallampati score is 2-3  Eyes:      Conjunctiva/sclera: Conjunctivae normal.      Pupils: Pupils are equal, round, and reactive to light.   Cardiovascular:      Rate and Rhythm: Normal rate and regular rhythm.      Heart sounds: Normal heart sounds.   Pulmonary:      Effort: Pulmonary effort is normal.      Comments: Lung sounds are clear.  No wheezes, crackles or rhonchi  Abdominal:      General: There is no distension.      Palpations: Abdomen is soft.      Tenderness: There is no abdominal tenderness.   Musculoskeletal:      Cervical back: Neck supple.      Comments: No edema, cyanosis or clubbing   Skin:     General: Skin is warm and dry.   Neurological:      General: No focal deficit present.      Mental Status: He is alert and oriented to person, place, and time.   Psychiatric:         Mood and Affect: Mood normal.         Behavior: Behavior normal.         Thought Content: Thought content normal.

## 2024-02-06 NOTE — PATIENT INSTRUCTIONS
I will check with sleep lab and find out if your EKG rhythm showed atrial fibrillation or not    I wrote for blood work.  This includes iron panel, CMP and CBC

## 2024-02-08 LAB
ALBUMIN SERPL-MCNC: 4.3 G/DL (ref 3.9–4.9)
ALBUMIN/GLOB SERPL: 2 {RATIO} (ref 1.2–2.2)
ALP SERPL-CCNC: 94 IU/L (ref 44–121)
ALT SERPL-CCNC: 16 IU/L (ref 0–44)
AST SERPL-CCNC: 22 IU/L (ref 0–40)
BASOPHILS # BLD AUTO: 0.1 X10E3/UL (ref 0–0.2)
BASOPHILS NFR BLD AUTO: 1 %
BILIRUB SERPL-MCNC: 0.5 MG/DL (ref 0–1.2)
BUN SERPL-MCNC: 16 MG/DL (ref 8–27)
BUN/CREAT SERPL: 14 (ref 10–24)
CALCIUM SERPL-MCNC: 9.4 MG/DL (ref 8.6–10.2)
CHLORIDE SERPL-SCNC: 104 MMOL/L (ref 96–106)
CO2 SERPL-SCNC: 23 MMOL/L (ref 20–29)
CREAT SERPL-MCNC: 1.11 MG/DL (ref 0.76–1.27)
EGFR: 73 ML/MIN/1.73
EOSINOPHIL # BLD AUTO: 0.1 X10E3/UL (ref 0–0.4)
EOSINOPHIL NFR BLD AUTO: 2 %
ERYTHROCYTE [DISTWIDTH] IN BLOOD BY AUTOMATED COUNT: 13.1 % (ref 11.6–15.4)
FERRITIN SERPL-MCNC: 178 NG/ML (ref 30–400)
GLOBULIN SER-MCNC: 2.2 G/DL (ref 1.5–4.5)
GLUCOSE SERPL-MCNC: 66 MG/DL (ref 70–99)
HCT VFR BLD AUTO: 41.2 % (ref 37.5–51)
HGB BLD-MCNC: 13.6 G/DL (ref 13–17.7)
IMM GRANULOCYTES # BLD: 0 X10E3/UL (ref 0–0.1)
IMM GRANULOCYTES NFR BLD: 0 %
IRON SATN MFR SERPL: 38 % (ref 15–55)
IRON SERPL-MCNC: 114 UG/DL (ref 38–169)
LYMPHOCYTES # BLD AUTO: 1.6 X10E3/UL (ref 0.7–3.1)
LYMPHOCYTES NFR BLD AUTO: 24 %
MCH RBC QN AUTO: 29 PG (ref 26.6–33)
MCHC RBC AUTO-ENTMCNC: 33 G/DL (ref 31.5–35.7)
MCV RBC AUTO: 88 FL (ref 79–97)
MONOCYTES # BLD AUTO: 0.5 X10E3/UL (ref 0.1–0.9)
MONOCYTES NFR BLD AUTO: 8 %
NEUTROPHILS # BLD AUTO: 4.2 X10E3/UL (ref 1.4–7)
NEUTROPHILS NFR BLD AUTO: 65 %
PLATELET # BLD AUTO: 202 X10E3/UL (ref 150–450)
POTASSIUM SERPL-SCNC: 3.8 MMOL/L (ref 3.5–5.2)
PROT SERPL-MCNC: 6.5 G/DL (ref 6–8.5)
RBC # BLD AUTO: 4.69 X10E6/UL (ref 4.14–5.8)
SODIUM SERPL-SCNC: 142 MMOL/L (ref 134–144)
TIBC SERPL-MCNC: 299 UG/DL (ref 250–450)
UIBC SERPL-MCNC: 185 UG/DL (ref 111–343)
WBC # BLD AUTO: 6.5 X10E3/UL (ref 3.4–10.8)

## 2024-02-10 PROBLEM — G47.61 PERIODIC LIMB MOVEMENTS OF SLEEP: Status: ACTIVE | Noted: 2024-02-10

## 2024-02-10 PROBLEM — Z86.73 HISTORY OF STROKE WITHOUT RESIDUAL DEFICITS: Status: ACTIVE | Noted: 2021-03-31

## 2024-02-10 PROBLEM — R53.83 OTHER FATIGUE: Status: ACTIVE | Noted: 2024-02-10

## 2024-02-10 NOTE — ASSESSMENT & PLAN NOTE
He did have diagnostic sleep study in the lab done over 15, 2023.  No evidence of any significant obstructive sleep apnea.  He did have many periodic limb movements but they in general did not cause arousals.  I did order serum iron and ferritin level

## 2024-02-10 NOTE — ASSESSMENT & PLAN NOTE
Does have some mild fatigue at times.  Diagnostic sleep study showed overall AHI to be normal.  Did advise him to follow good sleep hygiene and exercise.  I also ordered CBC

## 2024-02-10 NOTE — ASSESSMENT & PLAN NOTE
Lab Results   Component Value Date    EGFR 73 02/07/2024    EGFR 58 01/06/2023    EGFR 64 11/02/2020    CREATININE 1.11 02/07/2024    CREATININE 1.27 01/06/2023    CREATININE 1.20 11/02/2020       I ordered CBC and CMP to evaluate his chronic kidney disease.  This CMP was a year ago and creatinine was 1.27 at that time

## 2024-02-20 ENCOUNTER — OFFICE VISIT (OUTPATIENT)
Dept: INTERNAL MEDICINE CLINIC | Facility: CLINIC | Age: 69
End: 2024-02-20
Payer: COMMERCIAL

## 2024-02-20 VITALS
SYSTOLIC BLOOD PRESSURE: 106 MMHG | OXYGEN SATURATION: 97 % | HEIGHT: 71 IN | DIASTOLIC BLOOD PRESSURE: 70 MMHG | HEART RATE: 82 BPM | WEIGHT: 197 LBS | BODY MASS INDEX: 27.58 KG/M2

## 2024-02-20 DIAGNOSIS — K21.9 GASTROESOPHAGEAL REFLUX DISEASE WITHOUT ESOPHAGITIS: ICD-10-CM

## 2024-02-20 DIAGNOSIS — Z00.00 ENCOUNTER FOR SUBSEQUENT ANNUAL WELLNESS VISIT (AWV) IN MEDICARE PATIENT: ICD-10-CM

## 2024-02-20 DIAGNOSIS — E78.2 MIXED HYPERLIPIDEMIA: Primary | ICD-10-CM

## 2024-02-20 PROBLEM — I70.213 ATHEROSCLEROSIS OF NATIVE ARTERIES OF EXTREMITIES WITH INTERMITTENT CLAUDICATION, BILATERAL LEGS (HCC): Status: RESOLVED | Noted: 2023-01-06 | Resolved: 2024-02-20

## 2024-02-20 PROCEDURE — 99213 OFFICE O/P EST LOW 20 MIN: CPT | Performed by: INTERNAL MEDICINE

## 2024-02-20 PROCEDURE — G0439 PPPS, SUBSEQ VISIT: HCPCS | Performed by: INTERNAL MEDICINE

## 2024-02-20 RX ORDER — FAMOTIDINE 40 MG/1
40 TABLET, FILM COATED ORAL DAILY
Qty: 90 TABLET | Refills: 1 | Status: SHIPPED | OUTPATIENT
Start: 2024-02-20

## 2024-02-20 RX ORDER — SIMVASTATIN 40 MG
40 TABLET ORAL
Qty: 90 TABLET | Refills: 3 | Status: SHIPPED | OUTPATIENT
Start: 2024-02-20

## 2024-02-20 NOTE — PATIENT INSTRUCTIONS
Medicare Preventive Visit Patient Instructions  Thank you for completing your Welcome to Medicare Visit or Medicare Annual Wellness Visit today. Your next wellness visit will be due in one year (2/20/2025).  The screening/preventive services that you may require over the next 5-10 years are detailed below. Some tests may not apply to you based off risk factors and/or age. Screening tests ordered at today's visit but not completed yet may show as past due. Also, please note that scanned in results may not display below.  Preventive Screenings:  Service Recommendations Previous Testing/Comments   Colorectal Cancer Screening  Colonoscopy    Fecal Occult Blood Test (FOBT)/Fecal Immunochemical Test (FIT)  Fecal DNA/Cologuard Test  Flexible Sigmoidoscopy Age: 45-75 years old   Colonoscopy: every 10 years (May be performed more frequently if at higher risk)  OR  FOBT/FIT: every 1 year  OR  Cologuard: every 3 years  OR  Sigmoidoscopy: every 5 years  Screening may be recommended earlier than age 45 if at higher risk for colorectal cancer. Also, an individualized decision between you and your healthcare provider will decide whether screening between the ages of 76-85 would be appropriate. Colonoscopy: 04/21/2021  FOBT/FIT: Not on file  Cologuard: Not on file  Sigmoidoscopy: Not on file          Prostate Cancer Screening Individualized decision between patient and health care provider in men between ages of 55-69   Medicare will cover every 12 months beginning on the day after your 50th birthday PSA: 0.9 ng/mL           Hepatitis C Screening Once for adults born between 1945 and 1965  More frequently in patients at high risk for Hepatitis C Hep C Antibody: Not on file        Diabetes Screening 1-2 times per year if you're at risk for diabetes or have pre-diabetes Fasting glucose: 101 mg/dL (1/6/2023)  A1C: 5.3 % (1/6/2023)      Cholesterol Screening Once every 5 years if you don't have a lipid disorder. May order more often  based on risk factors. Lipid panel: 01/06/2023         Other Preventive Screenings Covered by Medicare:  Abdominal Aortic Aneurysm (AAA) Screening: covered once if your at risk. You're considered to be at risk if you have a family history of AAA or a male between the age of 65-75 who smoking at least 100 cigarettes in your lifetime.  Lung Cancer Screening: covers low dose CT scan once per year if you meet all of the following conditions: (1) Age 55-77; (2) No signs or symptoms of lung cancer; (3) Current smoker or have quit smoking within the last 15 years; (4) You have a tobacco smoking history of at least 20 pack years (packs per day x number of years you smoked); (5) You get a written order from a healthcare provider.  Glaucoma Screening: covered annually if you're considered high risk: (1) You have diabetes OR (2) Family history of glaucoma OR (3)  aged 50 and older OR (4)  American aged 65 and older  Osteoporosis Screening: covered every 2 years if you meet one of the following conditions: (1) Have a vertebral abnormality; (2) On glucocorticoid therapy for more than 3 months; (3) Have primary hyperparathyroidism; (4) On osteoporosis medications and need to assess response to drug therapy.  HIV Screening: covered annually if you're between the age of 15-65. Also covered annually if you are younger than 15 and older than 65 with risk factors for HIV infection. For pregnant patients, it is covered up to 3 times per pregnancy.    Immunizations:  Immunization Recommendations   Influenza Vaccine Annual influenza vaccination during flu season is recommended for all persons aged >= 6 months who do not have contraindications   Pneumococcal Vaccine   * Pneumococcal conjugate vaccine = PCV13 (Prevnar 13), PCV15 (Vaxneuvance), PCV20 (Prevnar 20)  * Pneumococcal polysaccharide vaccine = PPSV23 (Pneumovax) Adults 19-65 yo with certain risk factors or if 65+ yo  If never received any pneumonia vaccine:  recommend Prevnar 20 (PCV20)  Give PCV20 if previously received 1 dose of PCV13 or PPSV23   Hepatitis B Vaccine 3 dose series if at intermediate or high risk (ex: diabetes, end stage renal disease, liver disease)   Respiratory syncytial virus (RSV) Vaccine - COVERED BY MEDICARE PART D  * RSVPreF3 (Arexvy) CDC recommends that adults 60 years of age and older may receive a single dose of RSV vaccine using shared clinical decision-making (SCDM)   Tetanus (Td) Vaccine - COST NOT COVERED BY MEDICARE PART B Following completion of primary series, a booster dose should be given every 10 years to maintain immunity against tetanus. Td may also be given as tetanus wound prophylaxis.   Tdap Vaccine - COST NOT COVERED BY MEDICARE PART B Recommended at least once for all adults. For pregnant patients, recommended with each pregnancy.   Shingles Vaccine (Shingrix) - COST NOT COVERED BY MEDICARE PART B  2 shot series recommended in those 19 years and older who have or will have weakened immune systems or those 50 years and older     Health Maintenance Due:      Topic Date Due   • Hepatitis C Screening  Never done   • Colorectal Cancer Screening  04/21/2031     Immunizations Due:      Topic Date Due   • Pneumococcal Vaccine: 65+ Years (1 of 1 - PCV) Never done   • Influenza Vaccine (1) Never done   • COVID-19 Vaccine (4 - 2023-24 season) 09/01/2023     Advance Directives   What are advance directives?  Advance directives are legal documents that state your wishes and plans for medical care. These plans are made ahead of time in case you lose your ability to make decisions for yourself. Advance directives can apply to any medical decision, such as the treatments you want, and if you want to donate organs.   What are the types of advance directives?  There are many types of advance directives, and each state has rules about how to use them. You may choose a combination of any of the following:  Living will:  This is a written record  of the treatment you want. You can also choose which treatments you do not want, which to limit, and which to stop at a certain time. This includes surgery, medicine, IV fluid, and tube feedings.   Durable power of  for healthcare (DPAHC):  This is a written record that states who you want to make healthcare choices for you when you are unable to make them for yourself. This person, called a proxy, is usually a family member or a friend. You may choose more than 1 proxy.  Do not resuscitate (DNR) order:  A DNR order is used in case your heart stops beating or you stop breathing. It is a request not to have certain forms of treatment, such as CPR. A DNR order may be included in other types of advance directives.  Medical directive:  This covers the care that you want if you are in a coma, near death, or unable to make decisions for yourself. You can list the treatments you want for each condition. Treatment may include pain medicine, surgery, blood transfusions, dialysis, IV or tube feedings, and a ventilator (breathing machine).  Values history:  This document has questions about your views, beliefs, and how you feel and think about life. This information can help others choose the care that you would choose.  Why are advance directives important?  An advance directive helps you control your care. Although spoken wishes may be used, it is better to have your wishes written down. Spoken wishes can be misunderstood, or not followed. Treatments may be given even if you do not want them. An advance directive may make it easier for your family to make difficult choices about your care.   Weight Management   Why it is important to manage your weight:  Being overweight increases your risk of health conditions such as heart disease, high blood pressure, type 2 diabetes, and certain types of cancer. It can also increase your risk for osteoarthritis, sleep apnea, and other respiratory problems. Aim for a slow, steady  weight loss. Even a small amount of weight loss can lower your risk of health problems.  How to lose weight safely:  A safe and healthy way to lose weight is to eat fewer calories and get regular exercise. You can lose up about 1 pound a week by decreasing the number of calories you eat by 500 calories each day.   Healthy meal plan for weight management:  A healthy meal plan includes a variety of foods, contains fewer calories, and helps you stay healthy. A healthy meal plan includes the following:  Eat whole-grain foods more often.  A healthy meal plan should contain fiber. Fiber is the part of grains, fruits, and vegetables that is not broken down by your body. Whole-grain foods are healthy and provide extra fiber in your diet. Some examples of whole-grain foods are whole-wheat breads and pastas, oatmeal, brown rice, and bulgur.  Eat a variety of vegetables every day.  Include dark, leafy greens such as spinach, kale, rajesh greens, and mustard greens. Eat yellow and orange vegetables such as carrots, sweet potatoes, and winter squash.   Eat a variety of fruits every day.  Choose fresh or canned fruit (canned in its own juice or light syrup) instead of juice. Fruit juice has very little or no fiber.  Eat low-fat dairy foods.  Drink fat-free (skim) milk or 1% milk. Eat fat-free yogurt and low-fat cottage cheese. Try low-fat cheeses such as mozzarella and other reduced-fat cheeses.  Choose meat and other protein foods that are low in fat.  Choose beans or other legumes such as split peas or lentils. Choose fish, skinless poultry (chicken or turkey), or lean cuts of red meat (beef or pork). Before you cook meat or poultry, cut off any visible fat.   Use less fat and oil.  Try baking foods instead of frying them. Add less fat, such as margarine, sour cream, regular salad dressing and mayonnaise to foods. Eat fewer high-fat foods. Some examples of high-fat foods include french fries, doughnuts, ice cream, and  cakes.  Eat fewer sweets.  Limit foods and drinks that are high in sugar. This includes candy, cookies, regular soda, and sweetened drinks.  Exercise:  Exercise at least 30 minutes per day on most days of the week. Some examples of exercise include walking, biking, dancing, and swimming. You can also fit in more physical activity by taking the stairs instead of the elevator or parking farther away from stores. Ask your healthcare provider about the best exercise plan for you.      © Copyright [employer] 2018 Information is for End User's use only and may not be sold, redistributed or otherwise used for commercial purposes. All illustrations and images included in CareNotes® are the copyrighted property of [employer] or [employer]

## 2024-02-20 NOTE — ASSESSMENT & PLAN NOTE
Lab Results   Component Value Date    EGFR 73 02/07/2024    EGFR 58 01/06/2023    EGFR 64 11/02/2020    CREATININE 1.11 02/07/2024    CREATININE 1.27 01/06/2023    CREATININE 1.20 11/02/2020   Much improved GFR now 73 creatinine 1.1 avoid nephrotoxic will monitor closely

## 2024-02-20 NOTE — PROGRESS NOTES
Assessment and Plan:     Problem List Items Addressed This Visit        Digestive    Gastroesophageal reflux disease without esophagitis     Complaining of intermittent heartburn history of GERD    Will start Pepcid 40 mg daily    Instructed about the diet to avoid chocolate caffeine alcohol nicotine and steroid         Relevant Medications    famotidine (PEPCID) 40 MG tablet       Genitourinary    Stage 3a chronic kidney disease (HCC)     Lab Results   Component Value Date    EGFR 73 02/07/2024    EGFR 58 01/06/2023    EGFR 64 11/02/2020    CREATININE 1.11 02/07/2024    CREATININE 1.27 01/06/2023    CREATININE 1.20 11/02/2020   Much improved GFR now 73 creatinine 1.1 avoid nephrotoxic will monitor closely            Other    Encounter for subsequent annual wellness visit (AWV) in Medicare patient    Mixed hyperlipidemia - Primary     Lab Results   Component Value Date    LDLCALC 166 (H) 01/06/2023   Above reviewed    Not on any kind of treatment    Start simvastatin 40 mg daily with low-fat low-cholesterol diet explained the side effect         Relevant Medications    simvastatin (ZOCOR) 40 mg tablet        Preventive health issues were discussed with patient, and age appropriate screening tests were ordered as noted in patient's After Visit Summary.  Personalized health advice and appropriate referrals for health education or preventive services given if needed, as noted in patient's After Visit Summary.     History of Present Illness:     Patient presents for a Medicare Wellness Visit    Patient came in follow-up chronic medical condition listed under visit diagnosis complaining of heartburn intermittent was taking over-the-counter Pepcid with some relief and also labs reviewed LDL high discussed treatment options started on simvastatin no chest pain no difficulty breathing patient was seen by sleep apnea medicine this study done negative all the labs reviewed       Patient Care Team:  Alber Matthews MD as  PCP - General (Internal Medicine)  Alber Matthews MD as PCP - P[facility] ([facility])  Gorge Deleon MD as Endoscopist     Review of Systems:     Review of Systems   Constitutional:  Negative for activity change, appetite change, chills, diaphoresis, fever and unexpected weight change.   HENT:  Negative for congestion, dental problem, drooling, ear discharge, ear pain, facial swelling, mouth sores, nosebleeds, postnasal drip, rhinorrhea, sinus pressure, sneezing, sore throat, trouble swallowing and voice change.    Eyes:  Negative for photophobia, pain, discharge, redness, itching and visual disturbance.   Respiratory:  Negative for apnea, cough, choking, chest tightness, shortness of breath, wheezing and stridor.    Cardiovascular:  Negative for chest pain, palpitations and leg swelling.   Gastrointestinal:  Negative for abdominal distention, abdominal pain, anal bleeding, blood in stool, constipation, diarrhea, nausea, rectal pain and vomiting.   Endocrine: Negative for cold intolerance, heat intolerance, polydipsia, polyphagia and polyuria.   Genitourinary:  Negative for decreased urine volume, difficulty urinating, dysuria, enuresis, flank pain, frequency, genital sores, hematuria and urgency.   Musculoskeletal:  Negative for back pain, gait problem, joint swelling, myalgias, neck pain and neck stiffness.   Skin:  Negative for color change, pallor, rash and wound.   Allergic/Immunologic: Negative.  Negative for environmental allergies, food allergies and immunocompromised state.   Neurological:  Negative for dizziness, tremors, seizures, syncope, facial asymmetry, speech difficulty, weakness, light-headedness, numbness and headaches.   Psychiatric/Behavioral:  Negative for agitation, behavioral problems, confusion, decreased concentration, dysphoric mood, hallucinations, self-injury, sleep disturbance and suicidal ideas. The patient is not nervous/anxious and is not hyperactive.         Problem  List:     Patient Active Problem List   Diagnosis   • Dizziness   • Headaches, cluster   • Tinnitus of both ears   • Sinusitis chronic, frontal   • Gastroesophageal reflux disease with esophagitis without hemorrhage   • Hx of adenomatous colonic polyps   • History of stroke without residual deficits   • Chronic gastric ulcer without hemorrhage and without perforation   • Encounter for subsequent annual wellness visit (AWV) in Medicare patient   • Stage 3a chronic kidney disease (HCC)   • Mixed hyperlipidemia   • Sensorineural hearing loss (SNHL), bilateral   • Other sleep apnea   • Hypersomnia   • Snoring   • Periodic limb movements of sleep   • Other fatigue   • Gastroesophageal reflux disease without esophagitis      Past Medical and Surgical History:     Past Medical History:   Diagnosis Date   • Cataract    • Colon polyp    • Dizziness    • Ear problems 2020   • GERD (gastroesophageal reflux disease)    • Hyperlipidemia    • Lightheadedness    • Seasonal allergies    • Tinnitus    • Wears glasses      Past Surgical History:   Procedure Laterality Date   • COLONOSCOPY     • CO COLONOSCOPY FLX DX W/COLLJ SPEC WHEN PFRMD N/A 2/29/2016    Procedure: COLONOSCOPY;  Surgeon: Gorge Deleon MD;  Location: [facility];  Service: Gastroenterology   • CO EGD TRANSORAL BIOPSY SINGLE/MULTIPLE N/A 2/29/2016    Procedure: ESOPHAGOGASTRODUODENOSCOPY (EGD);  Surgeon: Gorge Deleon MD;  Location: [facility];  Service: Gastroenterology   • TONSILLECTOMY N/A     child      Family History:     Family History   Problem Relation Age of Onset   • Arthritis Mother    • Alzheimer's disease Mother    • Other Mother         smoker-phlegm   • Alcohol abuse Father    • Suicidality Father       Social History:     Social History     Socioeconomic History   • Marital status: Single     Spouse name: None   • Number of children: None   • Years of education: None   • Highest education level: None   Occupational History   • None   Tobacco Use    • Smoking status: Former     Current packs/day: 0.00     Average packs/day: 0.5 packs/day for 8.2 years (4.1 ttl pk-yrs)     Types: Cigarettes     Start date: 1975     Quit date: 1983     Years since quittin.0   • Smokeless tobacco: Never   Vaping Use   • Vaping status: Never Used   Substance and Sexual Activity   • Alcohol use: Yes     Comment: on occ   • Drug use: No   • Sexual activity: Yes     Partners: Female     Birth control/protection: Condom Male   Other Topics Concern   • None   Social History Narrative   • None     Social Determinants of Health     Financial Resource Strain: Low Risk  (2024)    Overall Financial Resource Strain (CARDIA)    • Difficulty of Paying Living Expenses: Not hard at all   Food Insecurity: Not on file   Transportation Needs: Unknown (2024)    PRAPARE - Transportation    • Lack of Transportation (Medical): Not on file    • Lack of Transportation (Non-Medical): No   Physical Activity: Not on file   Stress: Not on file   Social Connections: Not on file   Intimate Partner Violence: Not on file   Housing Stability: Not on file      Medications and Allergies:     Current Outpatient Medications   Medication Sig Dispense Refill   • cholecalciferol (VITAMIN D3) 1,000 units tablet Take 1,000 Units by mouth daily     • Cyanocobalamin (VITAMIN B 12 PO) Take by mouth in the morning     • famotidine (PEPCID) 40 MG tablet Take 1 tablet (40 mg total) by mouth daily 90 tablet 1   • simvastatin (ZOCOR) 40 mg tablet Take 1 tablet (40 mg total) by mouth daily at bedtime 90 tablet 3   • VITAMIN E PO Take by mouth in the morning     • atorvastatin (LIPITOR) 40 mg tablet Take 1 tablet (40 mg total) by mouth daily (Patient not taking: Reported on 2023) 90 tablet 1     No current facility-administered medications for this visit.     Allergies   Allergen Reactions   • Penicillins Other (See Comments)     dizziness      Immunizations:     Immunization History   Administered  Date(s) Administered   • COVID-19 MODERNA VACC 0.25 ML IM BOOSTER 12/07/2021   • COVID-19 MODERNA VACC 0.5 ML IM 05/05/2021, 06/07/2021      Health Maintenance:         Topic Date Due   • Hepatitis C Screening  Never done   • Colorectal Cancer Screening  04/21/2031         Topic Date Due   • Pneumococcal Vaccine: 65+ Years (1 of 1 - PCV) Never done   • Influenza Vaccine (1) Never done   • COVID-19 Vaccine (4 - 2023-24 season) 09/01/2023      Medicare Screening Tests and Risk Assessments:     Shay is here for his Subsequent Wellness visit.     Health Risk Assessment:   Patient rates overall health as good. Patient feels that their physical health rating is same. Patient is satisfied with their life. Eyesight was rated as same. Hearing was rated as same. Patient feels that their emotional and mental health rating is same. Patients states they are sometimes angry. Patient states they are sometimes unusually tired/fatigued. Pain experienced in the last 7 days has been none. Patient states that he has experienced no weight loss or gain in last 6 months.     Depression Screening:   PHQ-2 Score: 0      Fall Risk Screening:   In the past year, patient has experienced: no history of falling in past year      Home Safety:  Patient does not have trouble with stairs inside or outside of their home. Patient has working smoke alarms and has working carbon monoxide detector. Home safety hazards include: none.     Nutrition:   Current diet is Regular.     Medications:   Patient is currently taking over-the-counter supplements. OTC medications include: see medication list. Patient is able to manage medications.     Activities of Daily Living (ADLs)/Instrumental Activities of Daily Living (IADLs):   Walk and transfer into and out of bed and chair?: Yes  Dress and groom yourself?: Yes    Bathe or shower yourself?: Yes    Feed yourself? Yes  Do your laundry/housekeeping?: Yes  Manage your money, pay your bills and track your  "expenses?: Yes  Make your own meals?: Yes    Do your own shopping?: Yes    Previous Hospitalizations:   Any hospitalizations or ED visits within the last 12 months?: No      Advance Care Planning:   Living will: No    Durable POA for healthcare: No    Advanced directive: No      Cognitive Screening:   Provider or family/friend/caregiver concerned regarding cognition?: No    PREVENTIVE SCREENINGS      Cardiovascular Screening:    General: Screening Not Indicated and History Lipid Disorder      Diabetes Screening:     General: Screening Current      Colorectal Cancer Screening:     General: Screening Current      Prostate Cancer Screening:    General: Screening Current      Abdominal Aortic Aneurysm (AAA) Screening:    Risk factors include: age between 65-74 yo and tobacco use        Lung Cancer Screening:     General: Screening Not Indicated    Screening, Brief Intervention, and Referral to Treatment (SBIRT)    Screening  Typical number of drinks in a day: 1  Typical number of drinks in a week: 1  Interpretation: Low risk drinking behavior.    AUDIT-C Screenin) How often did you have a drink containing alcohol in the past year? monthly or less  2) How many drinks did you have on a typical day when you were drinking in the past year? 0  3) How often did you have 6 or more drinks on one occasion in the past year? never    AUDIT-C Score: 1  Interpretation: Score 0-3 (male): Negative screen for alcohol misuse    Single Item Drug Screening:  How often have you used an illegal drug (including marijuana) or a prescription medication for non-medical reasons in the past year? never    Single Item Drug Screen Score: 0  Interpretation: Negative screen for possible drug use disorder    Other Counseling Topics:   Skin self-exam and calcium and vitamin D intake.     No results found.     Physical Exam:     /70   Pulse 82   Ht 5' 11\" (1.803 m)   Wt 89.4 kg (197 lb)   SpO2 97%   BMI 27.48 kg/m²     Physical " Exam  Vitals and nursing note reviewed.   Constitutional:       General: He is not in acute distress.     Appearance: He is well-developed.   HENT:      Head: Normocephalic and atraumatic.   Eyes:      Conjunctiva/sclera: Conjunctivae normal.   Cardiovascular:      Rate and Rhythm: Normal rate and regular rhythm.      Heart sounds: No murmur heard.  Pulmonary:      Effort: Pulmonary effort is normal. No respiratory distress.      Breath sounds: Normal breath sounds.   Abdominal:      Palpations: Abdomen is soft.      Tenderness: There is no abdominal tenderness.   Musculoskeletal:         General: No swelling.      Cervical back: Neck supple.   Skin:     General: Skin is warm and dry.      Capillary Refill: Capillary refill takes less than 2 seconds.   Neurological:      General: No focal deficit present.      Mental Status: He is alert.   Psychiatric:         Mood and Affect: Mood normal.          Alber Matthews MD

## 2024-02-20 NOTE — ASSESSMENT & PLAN NOTE
Complaining of intermittent heartburn history of GERD    Will start Pepcid 40 mg daily    Instructed about the diet to avoid chocolate caffeine alcohol nicotine and steroid

## 2024-02-20 NOTE — ASSESSMENT & PLAN NOTE
Lab Results   Component Value Date    LDLCALC 166 (H) 01/06/2023   Above reviewed    Not on any kind of treatment    Start simvastatin 40 mg daily with low-fat low-cholesterol diet explained the side effect

## 2024-02-21 PROBLEM — Z00.00 ENCOUNTER FOR SUBSEQUENT ANNUAL WELLNESS VISIT (AWV) IN MEDICARE PATIENT: Status: RESOLVED | Noted: 2023-01-06 | Resolved: 2024-02-21

## 2024-03-11 ENCOUNTER — TELEPHONE (OUTPATIENT)
Dept: GASTROENTEROLOGY | Facility: CLINIC | Age: 69
End: 2024-03-11

## 2024-03-11 DIAGNOSIS — Z86.010 HX OF ADENOMATOUS COLONIC POLYPS: ICD-10-CM

## 2024-03-11 DIAGNOSIS — D12.6 TUBULOVILLOUS ADENOMA POLYP OF COLON: Primary | ICD-10-CM

## 2024-03-11 NOTE — TELEPHONE ENCOUNTER
Scheduled date of colonoscopy (as of today): 5/15/24  Physician performing colonoscopy: Dr Oates   Location of colonoscopy: [facility]   Bowel prep reviewed with patient: Golytely via my chart   Instructions reviewed with patient by: n/a  Clearances: n/a

## 2024-03-11 NOTE — TELEPHONE ENCOUNTER
03/11/24  Screened by: Natalie Hanson    Referring Provider Dr Pancho aparicio    Pre- Screening:     There is no height or weight on file to calculate BMI.  Has patient been referred for a routine screening Colonoscopy? yes  Is the patient between 45-75 years old? yes      Previous Colonoscopy yes   If yes:    Date: recall     Facility:     Reason:      SCHEDULING STAFF: If the patient is between 45yrs-49yrs, please advise patient to confirm benefits/coverage with their insurance company for a routine screening colonoscopy, some insurance carriers will only cover at 50yrs or older. If the patient is over 75years old, please schedule an office visit.     Does the patient want to see a Gastroenterologist prior to their procedure OR are they having any GI symptoms? no    Has the patient been hospitalized or had abdominal surgery in the past 6 months? no    Does the patient use supplemental oxygen? no    Does the patient take Coumadin, Lovenox, Plavix, Elliquis, Xarelto, or other blood thinning medication? no    Has the patient had a stroke, cardiac event, or stent placed in the past year? no    SCHEDULING STAFF: If patient answers NO to above questions, then schedule procedure. If patient answers YES to above questions, then schedule office appointment.     If patient is between 45yrs - 49yrs, please advise patient that we will have to confirm benefits & coverage with their insurance company for a routine screening colonoscopy.

## 2024-04-29 ENCOUNTER — OFFICE VISIT (OUTPATIENT)
Dept: INTERNAL MEDICINE CLINIC | Facility: CLINIC | Age: 69
End: 2024-04-29
Payer: COMMERCIAL

## 2024-04-29 VITALS
HEART RATE: 69 BPM | HEIGHT: 71 IN | WEIGHT: 196 LBS | RESPIRATION RATE: 16 BRPM | OXYGEN SATURATION: 97 % | BODY MASS INDEX: 27.44 KG/M2 | DIASTOLIC BLOOD PRESSURE: 92 MMHG | SYSTOLIC BLOOD PRESSURE: 120 MMHG | TEMPERATURE: 97.8 F

## 2024-04-29 DIAGNOSIS — E55.9 VITAMIN D DEFICIENCY: ICD-10-CM

## 2024-04-29 DIAGNOSIS — I10 PRIMARY HYPERTENSION: ICD-10-CM

## 2024-04-29 DIAGNOSIS — K21.00 GASTROESOPHAGEAL REFLUX DISEASE WITH ESOPHAGITIS WITHOUT HEMORRHAGE: ICD-10-CM

## 2024-04-29 DIAGNOSIS — N18.31 STAGE 3A CHRONIC KIDNEY DISEASE (HCC): Primary | ICD-10-CM

## 2024-04-29 PROCEDURE — 99213 OFFICE O/P EST LOW 20 MIN: CPT | Performed by: INTERNAL MEDICINE

## 2024-04-29 PROCEDURE — G2211 COMPLEX E/M VISIT ADD ON: HCPCS | Performed by: INTERNAL MEDICINE

## 2024-04-29 RX ORDER — ERGOCALCIFEROL 1.25 MG/1
50000 CAPSULE ORAL WEEKLY
Qty: 12 CAPSULE | Refills: 1 | Status: SHIPPED | OUTPATIENT
Start: 2024-04-29

## 2024-04-29 RX ORDER — DEXAMETHASONE 4 MG/1
TABLET ORAL
COMMUNITY
Start: 2024-03-05

## 2024-04-29 NOTE — PROGRESS NOTES
Dr. Matthews's Office Visit Note  24     Shay Aguilar 69 y.o. male MRN: [id number]  : 1955    Assessment:     1. Stage 3a chronic kidney disease (HCC)  Assessment & Plan:  Lab Results   Component Value Date    EGFR 73 2024    EGFR 58 2023    EGFR 64 2020    CREATININE 1.11 2024    CREATININE 1.27 2023    CREATININE 1.20 2020   GFR stable at 73 much improved creatinine 1.1 avoid any nephrotoxic continue current treatment      2. Primary hypertension  Assessment & Plan:  No chest pain no difficulty breathing asymptomatic diastolic blood pressure is 92      3. Gastroesophageal reflux disease with esophagitis without hemorrhage  Assessment & Plan:  Symptoms controlled continue Pepcid as needed            Discussion Summary and Plan:  Today's care plan and medications were reviewed with patient in detail and all their questions answered to their satisfaction.    Chief Complaint   Patient presents with   • Earache     Bilateral ear problem - ears are ringing   • Follow-up     Verucose veins on both ankles (causing numbness and tingling in feet) - ongoing for months; annual lab work      Subjective:  Earache   Pertinent negatives include no abdominal pain, coughing, diarrhea, ear discharge, headaches, hearing loss, neck pain, rash, rhinorrhea, sore throat or vomiting.       The following portions of the patient's history were reviewed and updated as appropriate: allergies, current medications, past family history, past medical history, past social history, past surgical history and problem list.    Review of Systems   Constitutional:  Positive for activity change. Negative for appetite change, chills, diaphoresis, fatigue, fever and unexpected weight change.   HENT:  Positive for ear pain. Negative for congestion, dental problem, drooling, ear discharge, facial swelling, hearing loss, mouth sores, nosebleeds, postnasal drip, rhinorrhea, sinus pressure, sneezing, sore throat,  tinnitus, trouble swallowing and voice change.    Eyes:  Negative for photophobia, pain, discharge, redness, itching and visual disturbance.   Respiratory:  Negative for apnea, cough, choking, chest tightness, shortness of breath, wheezing and stridor.    Cardiovascular:  Negative for chest pain, palpitations and leg swelling.   Gastrointestinal:  Negative for abdominal distention, abdominal pain, anal bleeding, blood in stool, constipation, diarrhea, nausea, rectal pain and vomiting.   Endocrine: Negative for cold intolerance, heat intolerance, polydipsia, polyphagia and polyuria.   Genitourinary:  Negative for decreased urine volume, difficulty urinating, dysuria, enuresis, flank pain, frequency, genital sores, hematuria and urgency.   Musculoskeletal:  Negative for arthralgias, back pain, gait problem, joint swelling, myalgias, neck pain and neck stiffness.   Skin:  Negative for color change, pallor, rash and wound.   Allergic/Immunologic: Negative.  Negative for environmental allergies, food allergies and immunocompromised state.   Neurological:  Negative for dizziness, tremors, seizures, syncope, facial asymmetry, speech difficulty, weakness, light-headedness, numbness and headaches.   Psychiatric/Behavioral:  Negative for agitation, behavioral problems, confusion, decreased concentration, dysphoric mood, hallucinations, self-injury, sleep disturbance and suicidal ideas. The patient is not nervous/anxious and is not hyperactive.          Historical Information   Patient Active Problem List   Diagnosis   • Dizziness   • Headaches, cluster   • Tinnitus of both ears   • Sinusitis chronic, frontal   • Gastroesophageal reflux disease with esophagitis without hemorrhage   • Hx of adenomatous colonic polyps   • History of stroke without residual deficits   • Chronic gastric ulcer without hemorrhage and without perforation   • Stage 3a chronic kidney disease (HCC)   • Mixed hyperlipidemia   • Sensorineural hearing loss  (SNHL), bilateral   • Other sleep apnea   • Hypersomnia   • Snoring   • Periodic limb movements of sleep   • Other fatigue   • Gastroesophageal reflux disease without esophagitis   • Primary hypertension     Past Medical History:   Diagnosis Date   • Cataract    • Colon polyp    • Dizziness    • Ear problems    • GERD (gastroesophageal reflux disease)    • Hyperlipidemia    • Lightheadedness    • Seasonal allergies    • Tinnitus    • Wears glasses      Past Surgical History:   Procedure Laterality Date   • COLONOSCOPY     • ME COLONOSCOPY FLX DX W/COLLJ SPEC WHEN PFRMD N/A 2016    Procedure: COLONOSCOPY;  Surgeon: Gorge Deleon MD;  Location: [facility];  Service: Gastroenterology   • ME EGD TRANSORAL BIOPSY SINGLE/MULTIPLE N/A 2016    Procedure: ESOPHAGOGASTRODUODENOSCOPY (EGD);  Surgeon: Gorge Deleon MD;  Location: [facility];  Service: Gastroenterology   • TONSILLECTOMY N/A     child     Social History     Substance and Sexual Activity   Alcohol Use Yes    Comment: on occ     Social History     Substance and Sexual Activity   Drug Use No     Social History     Tobacco Use   Smoking Status Former   • Current packs/day: 0.00   • Average packs/day: 0.5 packs/day for 8.2 years (4.1 ttl pk-yrs)   • Types: Cigarettes   • Start date: 1975   • Quit date: 1983   • Years since quittin.1   Smokeless Tobacco Never     Family History   Problem Relation Age of Onset   • Arthritis Mother    • Alzheimer's disease Mother    • Other Mother         smoker-phlegm   • Alcohol abuse Father    • Suicidality Father      Health Maintenance Due   Topic   • Hepatitis C Screening    • Zoster Vaccine (1 of 2)   • Pneumococcal Vaccine: 65+ Years (1 of 1 - PCV)   • Influenza Vaccine (1)   • COVID-19 Vaccine ( season)      Meds/Allergies       Current Outpatient Medications:   •  cholecalciferol (VITAMIN D3) 1,000 units tablet, Take 1,000 Units by mouth daily, Disp: , Rfl:   •  Cyanocobalamin  "(VITAMIN B 12 PO), Take by mouth in the morning, Disp: , Rfl:   •  dexamethasone (DECADRON) 4 mg tablet, TAKE 1 TABLET BY MOUTH STAT AND THEN TAKE 1 TABLET BY MOUTH 12 HOURS LATER, Disp: , Rfl:   •  famotidine (PEPCID) 40 MG tablet, Take 1 tablet (40 mg total) by mouth daily, Disp: 90 tablet, Rfl: 1  •  simvastatin (ZOCOR) 40 mg tablet, Take 1 tablet (40 mg total) by mouth daily at bedtime, Disp: 90 tablet, Rfl: 3  •  VITAMIN E PO, Take by mouth in the morning, Disp: , Rfl:   •  atorvastatin (LIPITOR) 40 mg tablet, Take 1 tablet (40 mg total) by mouth daily (Patient not taking: Reported on 6/21/2023), Disp: 90 tablet, Rfl: 1  •  polyethylene glycol (GOLYTELY) 4000 mL solution, Take 4,000 mL by mouth once for 1 dose, Disp: 4000 mL, Rfl: 0      Objective:    Vitals:   /92   Pulse 69   Temp 97.8 °F (36.6 °C)   Resp 16   Ht 5' 11\" (1.803 m)   Wt 88.9 kg (196 lb)   SpO2 97%   BMI 27.34 kg/m²   Body mass index is 27.34 kg/m².  Vitals:    04/29/24 1257   Weight: 88.9 kg (196 lb)       Physical Exam  Vitals and nursing note reviewed.   Constitutional:       General: He is not in acute distress.     Appearance: He is well-developed. He is not ill-appearing, toxic-appearing or diaphoretic.   HENT:      Head: Normocephalic and atraumatic.      Right Ear: External ear normal.      Left Ear: External ear normal.      Nose: Nose normal.      Mouth/Throat:      Pharynx: No oropharyngeal exudate.   Eyes:      General: Lids are normal. Lids are everted, no foreign bodies appreciated. No scleral icterus.        Right eye: No discharge.         Left eye: No discharge.      Conjunctiva/sclera: Conjunctivae normal.      Pupils: Pupils are equal, round, and reactive to light.   Neck:      Thyroid: No thyromegaly.      Vascular: Normal carotid pulses. No carotid bruit, hepatojugular reflux or JVD.      Trachea: No tracheal tenderness or tracheal deviation.   Cardiovascular:      Rate and Rhythm: Normal rate and regular " rhythm.      Pulses: Normal pulses.      Heart sounds: Normal heart sounds. No murmur heard.     No friction rub. No gallop.   Pulmonary:      Effort: Pulmonary effort is normal. No respiratory distress.      Breath sounds: Normal breath sounds. No stridor. No wheezing or rales.   Chest:      Chest wall: No tenderness.   Abdominal:      General: Bowel sounds are normal. There is no distension.      Palpations: Abdomen is soft. There is no mass.      Tenderness: There is no abdominal tenderness. There is no guarding or rebound.   Musculoskeletal:         General: No tenderness or deformity. Normal range of motion.      Cervical back: Normal range of motion and neck supple. No edema, erythema or rigidity. No spinous process tenderness or muscular tenderness. Normal range of motion.   Lymphadenopathy:      Head:      Right side of head: No submental, submandibular, tonsillar, preauricular or posterior auricular adenopathy.      Left side of head: No submental, submandibular, tonsillar, preauricular, posterior auricular or occipital adenopathy.      Cervical: No cervical adenopathy.      Right cervical: No superficial, deep or posterior cervical adenopathy.     Left cervical: No superficial, deep or posterior cervical adenopathy.      Upper Body:      Right upper body: No pectoral adenopathy.      Left upper body: No pectoral adenopathy.   Skin:     General: Skin is warm and dry.      Coloration: Skin is not pale.      Findings: No erythema or rash.   Neurological:      General: No focal deficit present.      Mental Status: He is alert and oriented to person, place, and time.      Cranial Nerves: No cranial nerve deficit.      Sensory: No sensory deficit.      Motor: No tremor, abnormal muscle tone or seizure activity.      Coordination: Coordination normal.      Gait: Gait normal.      Deep Tendon Reflexes: Reflexes are normal and symmetric. Reflexes normal.   Psychiatric:         Behavior: Behavior normal.          Thought Content: Thought content normal.         Judgment: Judgment normal.         Lab Review   No visits with results within 2 Month(s) from this visit.   Latest known visit with results is:   Orders Only on 02/07/2024   Component Date Value Ref Range Status   • White Blood Cell Count 02/07/2024 6.5  3.4 - 10.8 x10E3/uL Final   • Red Blood Cell Count 02/07/2024 4.69  4.14 - 5.80 x10E6/uL Final   • Hemoglobin 02/07/2024 13.6  13.0 - 17.7 g/dL Final   • HCT 02/07/2024 41.2  37.5 - 51.0 % Final   • MCV 02/07/2024 88  79 - 97 fL Final   • MCH 02/07/2024 29.0  26.6 - 33.0 pg Final   • MCHC 02/07/2024 33.0  31.5 - 35.7 g/dL Final   • RDW 02/07/2024 13.1  11.6 - 15.4 % Final   • Platelet Count 02/07/2024 202  150 - 450 x10E3/uL Final   • Neutrophils 02/07/2024 65  Not Estab. % Final   • Lymphocytes 02/07/2024 24  Not Estab. % Final   • Monocytes 02/07/2024 8  Not Estab. % Final   • Eosinophils 02/07/2024 2  Not Estab. % Final   • Basophils PCT 02/07/2024 1  Not Estab. % Final   • Neutrophils (Absolute) 02/07/2024 4.2  1.4 - 7.0 x10E3/uL Final   • Lymphocytes (Absolute) 02/07/2024 1.6  0.7 - 3.1 x10E3/uL Final   • Monocytes (Absolute) 02/07/2024 0.5  0.1 - 0.9 x10E3/uL Final   • Eosinophils (Absolute) 02/07/2024 0.1  0.0 - 0.4 x10E3/uL Final   • Basophils ABS 02/07/2024 0.1  0.0 - 0.2 x10E3/uL Final   • Immature Granulocytes 02/07/2024 0  Not Estab. % Final   • Immature Granulocytes (Absolute) 02/07/2024 0.0  0.0 - 0.1 x10E3/uL Final    Comment: A hand-written panel/profile was received from your office. In  accordance with the [facility] Ambiguous Test Code Policy dated July 2003, we have assigned CBC with Differential/Platelet, Test Code  #[id number] to this request. If this is not the testing you wished to  receive on this specimen, please contact the [facility] Client Inquiry/  Technical Services Department to clarify the test order. We  appreciate your business.     • Glucose, Random 02/07/2024 66 (L)  70 - 99 mg/dL Final   •  "BUN 02/07/2024 16  8 - 27 mg/dL Final   • Creatinine 02/07/2024 1.11  0.76 - 1.27 mg/dL Final   • eGFR 02/07/2024 73  >59 mL/min/1.73 Final   • SL AMB BUN/CREATININE RATIO 02/07/2024 14  10 - 24 Final   • Sodium 02/07/2024 142  134 - 144 mmol/L Final   • Potassium 02/07/2024 3.8  3.5 - 5.2 mmol/L Final   • Chloride 02/07/2024 104  96 - 106 mmol/L Final   • CO2 02/07/2024 23  20 - 29 mmol/L Final   • CALCIUM 02/07/2024 9.4  8.6 - 10.2 mg/dL Final   • Protein, Total 02/07/2024 6.5  6.0 - 8.5 g/dL Final   • Albumin 02/07/2024 4.3  3.9 - 4.9 g/dL Final   • Globulin, Total 02/07/2024 2.2  1.5 - 4.5 g/dL Final   • Albumin/Globulin Ratio 02/07/2024 2.0  1.2 - 2.2 Final   • TOTAL BILIRUBIN 02/07/2024 0.5  0.0 - 1.2 mg/dL Final   • Alk Phos Isoenzymes 02/07/2024 94  44 - 121 IU/L Final   • AST 02/07/2024 22  0 - 40 IU/L Final   • ALT 02/07/2024 16  0 - 44 IU/L Final   • Total Iron Binding Capacity (TIBC) 02/07/2024 299  250 - 450 ug/dL Final   • UIBC 02/07/2024 185  111 - 343 ug/dL Final   • Iron, Serum 02/07/2024 114  38 - 169 ug/dL Final   • Iron Saturation 02/07/2024 38  15 - 55 % Final   • Ferritin 02/07/2024 178  30 - 400 ng/mL Final         Patient Instructions   Pt is symptom free for this problem.  This diagnosis or problem is stable/well controlled  Patient is advised to continue same meds as outlined in medicine list          Alber Matthews MD        \"This note has been constructed using a voice recognition system.Therefore there may be syntax, spelling, and/or grammatical errors. Please call if you have any questions. \"  "

## 2024-04-29 NOTE — ASSESSMENT & PLAN NOTE
Lab Results   Component Value Date    EGFR 73 02/07/2024    EGFR 58 01/06/2023    EGFR 64 11/02/2020    CREATININE 1.11 02/07/2024    CREATININE 1.27 01/06/2023    CREATININE 1.20 11/02/2020   GFR stable at 73 much improved creatinine 1.1 avoid any nephrotoxic continue current treatment  
No chest pain no difficulty breathing asymptomatic diastolic blood pressure is 92  
Symptoms controlled continue Pepcid as needed  
Satisfactory

## 2024-05-01 ENCOUNTER — ANESTHESIA (OUTPATIENT)
Dept: ANESTHESIOLOGY | Facility: HOSPITAL | Age: 69
End: 2024-05-01

## 2024-05-01 ENCOUNTER — ANESTHESIA EVENT (OUTPATIENT)
Dept: ANESTHESIOLOGY | Facility: HOSPITAL | Age: 69
End: 2024-05-01

## 2024-05-08 ENCOUNTER — TELEPHONE (OUTPATIENT)
Age: 69
End: 2024-05-08

## 2024-05-13 ENCOUNTER — TELEPHONE (OUTPATIENT)
Dept: GASTROENTEROLOGY | Facility: CLINIC | Age: 69
End: 2024-05-13

## 2024-05-13 NOTE — TELEPHONE ENCOUNTER
I spoke to pt  confirming pt's colonoscopy scheduled on 5/15/24 at [facility] with Dr Oates .  Informed [facility] would be calling this pt with the arrival time.  Informed of clear liquid diet day prior as well as the bowel cleansing preparation.  Informed would need a  the day of the procedure due to being under sedation. I asked pt to please call back if has not received instructions or if has any questions.      My chart procedure notification returned as unread. Sb

## 2024-05-14 ENCOUNTER — ANESTHESIA (OUTPATIENT)
Dept: ANESTHESIOLOGY | Facility: HOSPITAL | Age: 69
End: 2024-05-14

## 2024-05-14 ENCOUNTER — ANESTHESIA EVENT (OUTPATIENT)
Dept: ANESTHESIOLOGY | Facility: HOSPITAL | Age: 69
End: 2024-05-14

## 2024-05-14 NOTE — ANESTHESIA PREPROCEDURE EVALUATION
Procedure:  PRE-OP ONLY    TTE: LVEF 50-55%, RV wnl  ECG: NSR with sinus arrhythmia   Relevant Problems   CARDIO   (+) Mixed hyperlipidemia   (+) Primary hypertension      GI/HEPATIC   (+) Chronic gastric ulcer without hemorrhage and without perforation   (+) Gastroesophageal reflux disease with esophagitis without hemorrhage   (+) Gastroesophageal reflux disease without esophagitis      /RENAL   (+) Stage 3a chronic kidney disease (HCC)      NEURO/PSYCH   (+) Headaches, cluster      PULMONARY   (+) Other sleep apnea             Anesthesia Plan  ASA Score- 2     Anesthesia Type- IV sedation with anesthesia with ASA Monitors.         Additional Monitors:     Airway Plan:            Plan Factors-    Chart reviewed. EKG reviewed.  Existing labs reviewed. Patient summary reviewed.                  Induction-     Postoperative Plan-     Informed Consent-

## 2024-05-15 ENCOUNTER — ANESTHESIA (OUTPATIENT)
Dept: GASTROENTEROLOGY | Facility: AMBULARY SURGERY CENTER | Age: 69
End: 2024-05-15

## 2024-05-15 ENCOUNTER — ANESTHESIA EVENT (OUTPATIENT)
Dept: GASTROENTEROLOGY | Facility: AMBULARY SURGERY CENTER | Age: 69
End: 2024-05-15

## 2024-05-15 ENCOUNTER — HOSPITAL ENCOUNTER (OUTPATIENT)
Dept: GASTROENTEROLOGY | Facility: AMBULARY SURGERY CENTER | Age: 69
Setting detail: OUTPATIENT SURGERY
Discharge: HOME | End: 2024-05-15
Attending: INTERNAL MEDICINE
Payer: COMMERCIAL

## 2024-05-15 VITALS
DIASTOLIC BLOOD PRESSURE: 80 MMHG | TEMPERATURE: 97.5 F | RESPIRATION RATE: 18 BRPM | OXYGEN SATURATION: 98 % | HEART RATE: 59 BPM | SYSTOLIC BLOOD PRESSURE: 126 MMHG

## 2024-05-15 DIAGNOSIS — Z86.010 HX OF ADENOMATOUS COLONIC POLYPS: ICD-10-CM

## 2024-05-15 DIAGNOSIS — D12.6 TUBULOVILLOUS ADENOMA POLYP OF COLON: ICD-10-CM

## 2024-05-15 PROCEDURE — 45380 COLONOSCOPY AND BIOPSY: CPT | Performed by: INTERNAL MEDICINE

## 2024-05-15 PROCEDURE — 88305 TISSUE EXAM BY PATHOLOGIST: CPT | Performed by: PATHOLOGY

## 2024-05-15 PROCEDURE — 45385 COLONOSCOPY W/LESION REMOVAL: CPT | Performed by: INTERNAL MEDICINE

## 2024-05-15 RX ORDER — LIDOCAINE HYDROCHLORIDE 10 MG/ML
INJECTION, SOLUTION EPIDURAL; INFILTRATION; INTRACAUDAL; PERINEURAL AS NEEDED
Status: DISCONTINUED | OUTPATIENT
Start: 2024-05-15 | End: 2024-05-15

## 2024-05-15 RX ORDER — PROPOFOL 10 MG/ML
INJECTION, EMULSION INTRAVENOUS CONTINUOUS PRN
Status: DISCONTINUED | OUTPATIENT
Start: 2024-05-15 | End: 2024-05-15

## 2024-05-15 RX ORDER — SODIUM CHLORIDE, SODIUM LACTATE, POTASSIUM CHLORIDE, CALCIUM CHLORIDE 600; 310; 30; 20 MG/100ML; MG/100ML; MG/100ML; MG/100ML
125 INJECTION, SOLUTION INTRAVENOUS CONTINUOUS
Status: DISCONTINUED | OUTPATIENT
Start: 2024-05-15 | End: 2024-05-19 | Stop reason: HOSPADM

## 2024-05-15 RX ORDER — PROPOFOL 10 MG/ML
INJECTION, EMULSION INTRAVENOUS AS NEEDED
Status: DISCONTINUED | OUTPATIENT
Start: 2024-05-15 | End: 2024-05-15

## 2024-05-15 RX ADMIN — SODIUM CHLORIDE, SODIUM LACTATE, POTASSIUM CHLORIDE, AND CALCIUM CHLORIDE 125 ML/HR: .6; .31; .03; .02 INJECTION, SOLUTION INTRAVENOUS at 11:13

## 2024-05-15 RX ADMIN — PROPOFOL 150 MG: 10 INJECTION, EMULSION INTRAVENOUS at 12:02

## 2024-05-15 RX ADMIN — LIDOCAINE HYDROCHLORIDE 50 MG: 10 INJECTION, SOLUTION EPIDURAL; INFILTRATION; INTRACAUDAL at 12:04

## 2024-05-15 RX ADMIN — PROPOFOL 110 MCG/KG/MIN: 10 INJECTION, EMULSION INTRAVENOUS at 12:03

## 2024-05-15 NOTE — H&P
History and Physical - [facility]  Shay Aguilar 69 y.o. male MRN: [id number]                  HPI: Shay Aguilar is a 69 y.o. year old male who presents for prior colon polyps      REVIEW OF SYSTEMS: Per the HPI, and otherwise unremarkable.    Historical Information   Past Medical History:   Diagnosis Date    Cataract     Colon polyp     Dizziness     Ear problems 2020    GERD (gastroesophageal reflux disease)     Hyperlipidemia     Lightheadedness     Seasonal allergies     Tinnitus     Wears glasses      Past Surgical History:   Procedure Laterality Date    COLONOSCOPY      AL COLONOSCOPY FLX DX W/COLLJ SPEC WHEN PFRMD N/A 2016    Procedure: COLONOSCOPY;  Surgeon: Gorge Deleon MD;  Location: [facility] GI [facility];  Service: Gastroenterology    AL EGD TRANSORAL BIOPSY SINGLE/MULTIPLE N/A 2016    Procedure: ESOPHAGOGASTRODUODENOSCOPY (EGD);  Surgeon: Gorge Deleon MD;  Location: [facility];  Service: Gastroenterology    TONSILLECTOMY N/A     child     Social History   Social History     Substance and Sexual Activity   Alcohol Use Yes    Comment: on occ     Social History     Substance and Sexual Activity   Drug Use No     Social History     Tobacco Use   Smoking Status Former    Current packs/day: 0.00    Average packs/day: 0.5 packs/day for 8.2 years (4.1 ttl pk-yrs)    Types: Cigarettes    Start date: 1975    Quit date: 1983    Years since quittin.2   Smokeless Tobacco Never     Family History   Problem Relation Age of Onset    Arthritis Mother     Alzheimer's disease Mother     Other Mother         smoker-phlegm    Alcohol abuse Father     Suicidality Father        Meds/Allergies       Current Outpatient Medications:     Cyanocobalamin (VITAMIN B 12 PO)    polyethylene glycol (GOLYTELY) 4000 mL solution    atorvastatin (LIPITOR) 40 mg tablet    cholecalciferol (VITAMIN D3) 1,000 units tablet    dexamethasone (DECADRON) 4 mg tablet    ergocalciferol (VITAMIN D2) 50,000 units     famotidine (PEPCID) 40 MG tablet    simvastatin (ZOCOR) 40 mg tablet    VITAMIN E PO    Current Facility-Administered Medications:     lactated ringers infusion, 125 mL/hr, Intravenous, Continuous, 125 mL/hr at 05/15/24 1113    Allergies   Allergen Reactions    Penicillins Other (See Comments)     dizziness       Objective     /76   Pulse 55   Temp 97.5 °F (36.4 °C) (Temporal)   Resp 18   SpO2 98%       PHYSICAL EXAM    Gen: NAD  Head: NCAT  CV: RRR  CHEST: Clear  ABD: soft, NT/ND  EXT: no edema      ASSESSMENT/PLAN:  This is a 69 y.o. year old male here for colonoscopy, and he is stable and optimized for his procedure.

## 2024-05-15 NOTE — ANESTHESIA POSTPROCEDURE EVALUATION
Post-Op Assessment Note    CV Status:  Stable  Pain Score: 0    Pain management: adequate       Mental Status:  Sleepy   Hydration Status:  Stable   PONV Controlled:  None   Airway Patency:  Patent     Post Op Vitals Reviewed: Yes    No anethesia notable event occurred.    Staff: Anesthesiologist, CRNA               BP   114/58   Temp      Pulse  51   Resp   14   SpO2   99

## 2024-05-15 NOTE — ANESTHESIA PREPROCEDURE EVALUATION
Procedure:  COLONOSCOPY  TTE: LVEF 50-55%, RV wnl  ECG: NSR with sinus arrhythmia     Denies the following: CP/SOB with exertion, asthma, COPD, ZENY, stroke/TIA, seizure    Relevant Problems   CARDIO   (+) Mixed hyperlipidemia   (+) Primary hypertension      GI/HEPATIC   (+) Chronic gastric ulcer without hemorrhage and without perforation   (+) Gastroesophageal reflux disease with esophagitis without hemorrhage   (+) Gastroesophageal reflux disease without esophagitis      /RENAL   (+) Stage 3a chronic kidney disease (HCC)      NEURO/PSYCH   (+) Headaches, cluster      PULMONARY   (+) Other sleep apnea        Physical Exam    Airway    Mallampati score: II  TM Distance: >3 FB  Neck ROM: full     Dental   No notable dental hx     Cardiovascular      Pulmonary      Other Findings        Anesthesia Plan  ASA Score- 2     Anesthesia Type- IV sedation with anesthesia with ASA Monitors.         Additional Monitors:     Airway Plan:            Plan Factors-Exercise tolerance (METS): >4 METS.    Chart reviewed. EKG reviewed.  Existing labs reviewed. Patient summary reviewed.    Patient is not a current smoker.      Obstructive sleep apnea risk education given perioperatively.        Induction-     Postoperative Plan-         Informed Consent- Anesthetic plan and risks discussed with patient.  I personally reviewed this patient with the CRNA. Discussed and agreed on the Anesthesia Plan with the CRNA..

## 2024-05-20 ENCOUNTER — TELEPHONE (OUTPATIENT)
Age: 69
End: 2024-05-20

## 2024-05-20 PROCEDURE — 88305 TISSUE EXAM BY PATHOLOGIST: CPT | Performed by: PATHOLOGY

## 2024-05-20 NOTE — TELEPHONE ENCOUNTER
Pt called stating he canceled his appt through the automated text he received and is receiving that he missed this appt- [id number]

## 2024-07-21 ENCOUNTER — RA CDI HCC (OUTPATIENT)
Dept: OTHER | Facility: HOSPITAL | Age: 69
End: 2024-07-21

## 2024-07-29 ENCOUNTER — OFFICE VISIT (OUTPATIENT)
Dept: INTERNAL MEDICINE CLINIC | Facility: CLINIC | Age: 69
End: 2024-07-29
Payer: COMMERCIAL

## 2024-07-29 VITALS
BODY MASS INDEX: 27.72 KG/M2 | HEIGHT: 71 IN | HEART RATE: 59 BPM | OXYGEN SATURATION: 99 % | TEMPERATURE: 98.2 F | DIASTOLIC BLOOD PRESSURE: 84 MMHG | WEIGHT: 198 LBS | RESPIRATION RATE: 16 BRPM | SYSTOLIC BLOOD PRESSURE: 114 MMHG

## 2024-07-29 DIAGNOSIS — E78.2 MIXED HYPERLIPIDEMIA: Primary | ICD-10-CM

## 2024-07-29 DIAGNOSIS — N18.31 STAGE 3A CHRONIC KIDNEY DISEASE (HCC): ICD-10-CM

## 2024-07-29 DIAGNOSIS — L91.8 SKIN TAG: ICD-10-CM

## 2024-07-29 DIAGNOSIS — E55.9 VITAMIN D DEFICIENCY: ICD-10-CM

## 2024-07-29 PROCEDURE — G2211 COMPLEX E/M VISIT ADD ON: HCPCS | Performed by: INTERNAL MEDICINE

## 2024-07-29 PROCEDURE — 99213 OFFICE O/P EST LOW 20 MIN: CPT | Performed by: INTERNAL MEDICINE

## 2024-07-29 NOTE — PROGRESS NOTES
Dr. Matthews's Office Visit Note  24     Shay Aguilar 69 y.o. male MRN: [id number]  : 1955    Assessment:     1. Mixed hyperlipidemia  Assessment & Plan:  Previous LDL reviewed elevated    Will repeat LDL before next visit with LFT agree and continue management medication as follows    Simvastatin 40 mg daily  Orders:  -     Comprehensive metabolic panel; Future  -     Lipid Panel with Direct LDL reflex; Future  -     Comprehensive metabolic panel  -     Lipid Panel with Direct LDL reflex  2. Stage 3a chronic kidney disease (HCC)  Assessment & Plan:  Lab Results   Component Value Date    EGFR 73 2024    EGFR 58 2023    EGFR 64 2020    CREATININE 1.11 2024    CREATININE 1.27 2023    CREATININE 1.20 2020   Resolved    Check BMP before next visit avoid nephrotoxic drug  Orders:  -     Comprehensive metabolic panel; Future  -     Lipid Panel with Direct LDL reflex; Future  -     Comprehensive metabolic panel  -     Lipid Panel with Direct LDL reflex  3. Skin tag  Assessment & Plan:  Multiple skin tag over the trunk awaiting to be seen by dermatology  Orders:  -     Ambulatory Referral to Dermatology; Future  4. Vitamin D deficiency  -     Vitamin D 25 hydroxy; Future; Expected date: 2024  -     Vitamin D 25 hydroxy        Discussion Summary and Plan:  Today's care plan and medications were reviewed with patient in detail and all their questions answered to their satisfaction.    Chief Complaint   Patient presents with   • Follow-up     Review labs    Skin tags on chest that are bothering him      Subjective:  Patient came in follow-up chronic medical condition listed visit diagnosis denies chest pain difficulty breathing overall health unchanged since the last visit patient was requested to blood work done before the next visit for details refer to assessment plan visit diagnosis        The following portions of the patient's history were reviewed and updated as  appropriate: allergies, current medications, past family history, past medical history, past social history, past surgical history and problem list.    Review of Systems   Constitutional:  Positive for activity change. Negative for appetite change, chills, diaphoresis, fatigue, fever and unexpected weight change.   HENT:  Positive for ear pain. Negative for congestion, dental problem, drooling, ear discharge, facial swelling, hearing loss, mouth sores, nosebleeds, postnasal drip, rhinorrhea, sinus pressure, sneezing, sore throat, tinnitus, trouble swallowing and voice change.    Eyes:  Negative for photophobia, pain, discharge, redness, itching and visual disturbance.   Respiratory:  Negative for apnea, cough, choking, chest tightness, shortness of breath, wheezing and stridor.    Cardiovascular:  Negative for chest pain, palpitations and leg swelling.   Gastrointestinal:  Negative for abdominal distention, abdominal pain, anal bleeding, blood in stool, constipation, diarrhea, nausea, rectal pain and vomiting.   Endocrine: Negative for cold intolerance, heat intolerance, polydipsia, polyphagia and polyuria.   Genitourinary:  Negative for decreased urine volume, difficulty urinating, dysuria, enuresis, flank pain, frequency, genital sores, hematuria and urgency.   Musculoskeletal:  Negative for arthralgias, back pain, gait problem, joint swelling, myalgias, neck pain and neck stiffness.   Skin:  Negative for color change, pallor, rash and wound.   Allergic/Immunologic: Negative.  Negative for environmental allergies, food allergies and immunocompromised state.   Neurological:  Negative for dizziness, tremors, seizures, syncope, facial asymmetry, speech difficulty, weakness, light-headedness, numbness and headaches.   Psychiatric/Behavioral:  Negative for agitation, behavioral problems, confusion, decreased concentration, dysphoric mood, hallucinations, self-injury, sleep disturbance and suicidal ideas. The patient is  not nervous/anxious and is not hyperactive.          Historical Information   Patient Active Problem List   Diagnosis   • Dizziness   • Headaches, cluster   • Tinnitus of both ears   • Sinusitis chronic, frontal   • Gastroesophageal reflux disease with esophagitis without hemorrhage   • Hx of adenomatous colonic polyps   • History of stroke without residual deficits   • Chronic gastric ulcer without hemorrhage and without perforation   • Stage 3a chronic kidney disease (HCC)   • Mixed hyperlipidemia   • Sensorineural hearing loss (SNHL), bilateral   • Other sleep apnea   • Hypersomnia   • Snoring   • Periodic limb movements of sleep   • Other fatigue   • Gastroesophageal reflux disease without esophagitis   • Primary hypertension   • Skin tag     Past Medical History:   Diagnosis Date   • Cataract    • Colon polyp    • Dizziness    • Ear problems    • GERD (gastroesophageal reflux disease)    • Hyperlipidemia    • Lightheadedness    • Seasonal allergies    • Tinnitus    • Wears glasses      Past Surgical History:   Procedure Laterality Date   • COLONOSCOPY     • DC COLONOSCOPY FLX DX W/COLLJ SPEC WHEN PFRMD N/A 2016    Procedure: COLONOSCOPY;  Surgeon: Gorge Deleon MD;  Location: [facility];  Service: Gastroenterology   • DC EGD TRANSORAL BIOPSY SINGLE/MULTIPLE N/A 2016    Procedure: ESOPHAGOGASTRODUODENOSCOPY (EGD);  Surgeon: Gorge Deleon MD;  Location: [facility];  Service: Gastroenterology   • TONSILLECTOMY N/A     child     Social History     Substance and Sexual Activity   Alcohol Use Yes    Comment: on occ     Social History     Substance and Sexual Activity   Drug Use No     Social History     Tobacco Use   Smoking Status Former   • Current packs/day: 0.00   • Average packs/day: 0.5 packs/day for 8.2 years (4.1 ttl pk-yrs)   • Types: Cigarettes   • Start date: 1975   • Quit date: 1983   • Years since quittin.4   Smokeless Tobacco Never     Family History   Problem Relation  "Age of Onset   • Arthritis Mother    • Alzheimer's disease Mother    • Other Mother         smoker-phlegm   • Alcohol abuse Father    • Suicidality Father      Health Maintenance Due   Topic   • Hepatitis C Screening    • Zoster Vaccine (1 of 2)   • RSV Vaccine Age 60+ Years (1 - 1-dose 60+ series)   • Pneumococcal Vaccine: 65+ Years (1 of 1 - PCV)   • COVID-19 Vaccine (4 - 2023-24 season)   • Influenza Vaccine (1)      Meds/Allergies       Current Outpatient Medications:   •  Cyanocobalamin (VITAMIN B 12 PO), Take by mouth in the morning, Disp: , Rfl:   •  ergocalciferol (VITAMIN D2) 50,000 units, Take 1 capsule (50,000 Units total) by mouth once a week, Disp: 12 capsule, Rfl: 1  •  famotidine (PEPCID) 40 MG tablet, Take 1 tablet (40 mg total) by mouth daily, Disp: 90 tablet, Rfl: 1  •  simvastatin (ZOCOR) 40 mg tablet, Take 1 tablet (40 mg total) by mouth daily at bedtime, Disp: 90 tablet, Rfl: 3  •  polyethylene glycol (GOLYTELY) 4000 mL solution, Take 4,000 mL by mouth once for 1 dose, Disp: 4000 mL, Rfl: 0      Objective:    Vitals:   /84   Pulse 59   Temp 98.2 °F (36.8 °C)   Resp 16   Ht 5' 11\" (1.803 m)   Wt 89.8 kg (198 lb)   SpO2 99%   BMI 27.62 kg/m²   Body mass index is 27.62 kg/m².  Vitals:    07/29/24 1343   Weight: 89.8 kg (198 lb)       Physical Exam  Vitals and nursing note reviewed.   Constitutional:       General: He is not in acute distress.     Appearance: He is well-developed. He is not ill-appearing, toxic-appearing or diaphoretic.   HENT:      Head: Normocephalic and atraumatic.      Right Ear: External ear normal.      Left Ear: External ear normal.      Nose: Nose normal.      Mouth/Throat:      Pharynx: No oropharyngeal exudate.   Eyes:      General: Lids are normal. Lids are everted, no foreign bodies appreciated. No scleral icterus.        Right eye: No discharge.         Left eye: No discharge.      Conjunctiva/sclera: Conjunctivae normal.      Pupils: Pupils are equal, " round, and reactive to light.   Neck:      Thyroid: No thyromegaly.      Vascular: Normal carotid pulses. No carotid bruit, hepatojugular reflux or JVD.      Trachea: No tracheal tenderness or tracheal deviation.   Cardiovascular:      Rate and Rhythm: Normal rate and regular rhythm.      Pulses: Normal pulses.      Heart sounds: Normal heart sounds. No murmur heard.     No friction rub. No gallop.   Pulmonary:      Effort: Pulmonary effort is normal. No respiratory distress.      Breath sounds: Normal breath sounds. No stridor. No wheezing or rales.   Chest:      Chest wall: No tenderness.   Abdominal:      General: Bowel sounds are normal. There is no distension.      Palpations: Abdomen is soft. There is no mass.      Tenderness: There is no abdominal tenderness. There is no guarding or rebound.   Musculoskeletal:         General: No tenderness or deformity. Normal range of motion.      Cervical back: Normal range of motion and neck supple. No edema, erythema or rigidity. No spinous process tenderness or muscular tenderness. Normal range of motion.   Lymphadenopathy:      Head:      Right side of head: No submental, submandibular, tonsillar, preauricular or posterior auricular adenopathy.      Left side of head: No submental, submandibular, tonsillar, preauricular, posterior auricular or occipital adenopathy.      Cervical: No cervical adenopathy.      Right cervical: No superficial, deep or posterior cervical adenopathy.     Left cervical: No superficial, deep or posterior cervical adenopathy.      Upper Body:      Right upper body: No pectoral adenopathy.      Left upper body: No pectoral adenopathy.   Skin:     General: Skin is warm and dry.      Coloration: Skin is not pale.      Findings: No erythema or rash.   Neurological:      General: No focal deficit present.      Mental Status: He is alert and oriented to person, place, and time.      Cranial Nerves: No cranial nerve deficit.      Sensory: No sensory  deficit.      Motor: No tremor, abnormal muscle tone or seizure activity.      Coordination: Coordination normal.      Gait: Gait normal.      Deep Tendon Reflexes: Reflexes are normal and symmetric. Reflexes normal.   Psychiatric:         Behavior: Behavior normal.         Thought Content: Thought content normal.         Judgment: Judgment normal.         Lab Review   No visits with results within 2 Month(s) from this visit.   Latest known visit with results is:   Hospital Outpatient Visit on 05/15/2024   Component Date Value Ref Range Status   • Case Report 05/15/2024    Final                    Value:Surgical Pathology Report                         Case: [id number]                                  Authorizing Provider:  Oneil Oates MD         Collected:           05/15/2024 1214              Ordering Location:     [facility]   Received:            05/15/2024 [address] [place] [address]                                                                       Pathologist:           Omaira Lozano MD                                                                 Specimens:   A) - Large Intestine, Right/Ascending Colon, ascending colon polyp bx                               B) - Large Intestine, Sigmoid Colon, Sigmoid Colon Polyp - Hot Snare                      • Final Diagnosis 05/15/2024    Final                    Value:A. Large Intestine, Right/Ascending Colon, ascending colon polyp bx:  - Tubular adenoma.  - No high grade dysplasia and no evidence of malignancy.     B. Large Intestine, Sigmoid Colon, Sigmoid Colon Polyp - Hot Snare:  - Tubular adenoma.  - No high grade dysplasia and no evidence of malignancy.  - Polypectomy margin is negative for dysplasia.     • Note 05/15/2024    Final                    Value:Interpretation performed at [facility], [address] [place] [id number]       • Additional Information 05/15/2024    Final                    Value:All reported  "additional testing was performed with appropriately reactive controls.  These tests were developed and their performance characteristics determined by [facility] or appropriate performing facility, though some tests may be performed on tissues which have not been validated for performance characteristics (such as staining performed on alcohol exposed cell blocks and decalcified tissues).  Results should be interpreted with caution and in the context of the patients’ clinical condition. These tests may not be cleared or approved by the U.S. Food and Drug Administration, though the FDA has determined that such clearance or approval is not necessary. These tests are used for clinical purposes and they should not be regarded as investigational or for research. This laboratory has been approved by CLIA 88, designated as a high-complexity laboratory and is qualified to perform these tests.  .     • Synoptic Checklist 05/15/2024    Final                    Value:                            COLON/RECTUM POLYP FORM - GI - All Specimens                                                                                     :    Adenoma(s)     • Gross Description 05/15/2024    Final                    Value:A. The specimen is received in formalin, labeled with the patient's name and hospital number, and is designated \" ascending colon polyp biopsy\".  It consists of multiple irregular fragments of pale-tan soft tissue ranging from 0.1 to 0.3 cm in greatest dimension.  The specimen is submitted in toto in screen cassette A1.  Please note: The smallest fragment may not survive processing.  B. The specimen is received in formalin, labeled with the patient's name and hospital number, and is designated \" sigmoid colon polyp-hot snare\".  It consists of a 1.8 x 1.5 x 1.3 cm polypoid portion of pink-purple, rubbery tissue.  The 0.5 cm in greatest dimension margin is inked black, the specimen is serially sectioned and " "entirely submitted in screen cassettes B1 and B2 with the central sections in B2.    Note: The estimated total formalin fixation time based upon information provided by the submitting clinician and the standard processing schedule is under 72 hours.    [place]           Patient Instructions   Pt is symptom free for this problem.  This diagnosis or problem is stable/well controlled  Patient is advised to continue same meds as outlined in medicine list    Pt should get blood test or other testing as per specialist ( or myself) prior to your next visit.        Alber Matthews MD        \"This note has been constructed using a voice recognition system.Therefore there may be syntax, spelling, and/or grammatical errors. Please call if you have any questions. \"  "

## 2024-07-29 NOTE — ASSESSMENT & PLAN NOTE
Previous LDL reviewed elevated    Will repeat LDL before next visit with LFT agree and continue management medication as follows    Simvastatin 40 mg daily

## 2024-07-29 NOTE — PATIENT INSTRUCTIONS
Pt is symptom free for this problem.  This diagnosis or problem is stable/well controlled  Patient is advised to continue same meds as outlined in medicine list    Pt should get blood test or other testing as per specialist ( or myself) prior to your next visit.

## 2024-07-29 NOTE — ASSESSMENT & PLAN NOTE
Lab Results   Component Value Date    EGFR 73 02/07/2024    EGFR 58 01/06/2023    EGFR 64 11/02/2020    CREATININE 1.11 02/07/2024    CREATININE 1.27 01/06/2023    CREATININE 1.20 11/02/2020   Resolved    Check BMP before next visit avoid nephrotoxic drug

## 2024-08-05 ENCOUNTER — APPOINTMENT (OUTPATIENT)
Dept: LAB | Facility: CLINIC | Age: 69
End: 2024-08-05
Payer: COMMERCIAL

## 2024-08-05 DIAGNOSIS — E55.9 VITAMIN D DEFICIENCY: ICD-10-CM

## 2024-08-05 DIAGNOSIS — N18.31 STAGE 3A CHRONIC KIDNEY DISEASE (HCC): ICD-10-CM

## 2024-08-05 DIAGNOSIS — E78.2 MIXED HYPERLIPIDEMIA: ICD-10-CM

## 2024-08-05 LAB
25(OH)D3 SERPL-MCNC: 31.9 NG/ML (ref 30–100)
ALBUMIN SERPL BCG-MCNC: 4.2 G/DL (ref 3.5–5)
ALP SERPL-CCNC: 75 U/L (ref 34–104)
ALT SERPL W P-5'-P-CCNC: 13 U/L (ref 7–52)
ANION GAP SERPL CALCULATED.3IONS-SCNC: 5 MMOL/L (ref 4–13)
AST SERPL W P-5'-P-CCNC: 17 U/L (ref 13–39)
BILIRUB SERPL-MCNC: 0.62 MG/DL (ref 0.2–1)
BUN SERPL-MCNC: 17 MG/DL (ref 5–25)
CALCIUM SERPL-MCNC: 9.1 MG/DL (ref 8.4–10.2)
CHLORIDE SERPL-SCNC: 107 MMOL/L (ref 96–108)
CHOLEST SERPL-MCNC: 134 MG/DL
CO2 SERPL-SCNC: 29 MMOL/L (ref 21–32)
CREAT SERPL-MCNC: 1.12 MG/DL (ref 0.6–1.3)
GFR SERPL CREATININE-BSD FRML MDRD: 67 ML/MIN/1.73SQ M
GLUCOSE P FAST SERPL-MCNC: 83 MG/DL (ref 65–99)
HDLC SERPL-MCNC: 60 MG/DL
LDLC SERPL CALC-MCNC: 59 MG/DL (ref 0–100)
POTASSIUM SERPL-SCNC: 4.3 MMOL/L (ref 3.5–5.3)
PROT SERPL-MCNC: 6.8 G/DL (ref 6.4–8.4)
SODIUM SERPL-SCNC: 141 MMOL/L (ref 135–147)
TRIGL SERPL-MCNC: 77 MG/DL

## 2024-08-05 PROCEDURE — 80061 LIPID PANEL: CPT

## 2024-08-05 PROCEDURE — 82306 VITAMIN D 25 HYDROXY: CPT

## 2024-08-05 PROCEDURE — 80053 COMPREHEN METABOLIC PANEL: CPT

## 2024-08-05 PROCEDURE — 36415 COLL VENOUS BLD VENIPUNCTURE: CPT

## 2024-10-28 ENCOUNTER — OFFICE VISIT (OUTPATIENT)
Dept: INTERNAL MEDICINE CLINIC | Facility: CLINIC | Age: 69
End: 2024-10-28
Payer: COMMERCIAL

## 2024-10-28 VITALS
HEART RATE: 64 BPM | WEIGHT: 198 LBS | OXYGEN SATURATION: 98 % | HEIGHT: 71 IN | TEMPERATURE: 97.7 F | RESPIRATION RATE: 15 BRPM | DIASTOLIC BLOOD PRESSURE: 82 MMHG | BODY MASS INDEX: 27.72 KG/M2 | SYSTOLIC BLOOD PRESSURE: 128 MMHG

## 2024-10-28 DIAGNOSIS — E78.2 MIXED HYPERLIPIDEMIA: ICD-10-CM

## 2024-10-28 DIAGNOSIS — M54.16 LUMBAR RADICULOPATHY: Primary | ICD-10-CM

## 2024-10-28 DIAGNOSIS — K25.7 CHRONIC GASTRIC ULCER WITHOUT HEMORRHAGE AND WITHOUT PERFORATION: ICD-10-CM

## 2024-10-28 PROCEDURE — G2211 COMPLEX E/M VISIT ADD ON: HCPCS | Performed by: INTERNAL MEDICINE

## 2024-10-28 PROCEDURE — 99213 OFFICE O/P EST LOW 20 MIN: CPT | Performed by: INTERNAL MEDICINE

## 2024-10-28 RX ORDER — DEXAMETHASONE 4 MG/1
TABLET ORAL
COMMUNITY
Start: 2024-09-24

## 2024-10-28 RX ORDER — CLINDAMYCIN HYDROCHLORIDE 150 MG/1
CAPSULE ORAL
COMMUNITY
Start: 2024-09-24

## 2024-10-28 NOTE — PATIENT INSTRUCTIONS
"Patient Education     Low back pain - Discharge instructions   The Basics   Written by the doctors and editors at [facility]   What are discharge instructions? -- Discharge instructions are information about how to take care of yourself after getting medical care for a health problem.  What is low back pain? -- Low back pain is pain or discomfort in the lower part of your back. Many people have low back pain at some point, and it most often gets better on its own. Many different things can cause low back pain. Most of the time, doctors do not know the exact cause.  Back pain can happen if you strain a muscle. This is often what has happened when a person \"throws out\" their back. This refers to pain that starts suddenly after physical activity, like lifting something heavy or bending the back.  Back pain can also happen if you have (figure 1):   Damaged, bulging, or torn discs   Arthritis affecting the joints of the spine   Bony growths on the vertebrae that crowd nearby nerves   A \"compression fracture\" due to osteoporosis (a condition that makes your bones weak)   A vertebra out of place   Narrowing in the spinal canal   A tumor or infection (but this is very rare)  How do I care for myself at home? -- Ask the doctor or nurse what you should do when you go home. Make sure that you understand exactly what you need to do to care for yourself. Ask questions if there is anything you do not understand.  You should also:   Use heat on your back for short periods of time, if it helps with pain. Put a heating pad (on the low setting) on your back for 20 minutes at a time a few times each day. Never go to sleep with heat on your back.   Try to stay as active as you can without causing too much pain, if your doctor or nurse said that it is OK. If your pain is severe, you might need to rest for a day or 2. But it's important to get back to walking and moving as soon as possible. Try to keep doing your normal daily activities. " "Get up and move around gently during the day as you are able.   Slowly start to increase your activity level as you are able to. If something causes your pain to come back or get worse, stop and go back to doing easier activities that did not hurt.   Avoid sitting or standing in 1 position for a long time. You might want to sleep with a pillow under or between your knees if this eases your pain.   Take a medicine like ibuprofen (sample brand names: Advil, Motrin) or naproxen (brand name: Aleve) for pain, if needed. These are nonsteroidal antiinflammatory drugs (\"NSAIDs\"). They might work better than acetaminophen for low back pain.   Talk to your doctor or nurse before trying any of the following. These treatments might help you feel better for a little while:   Spinal manipulation - This is when a chiropractor, physical therapist, or other professional moves or \"adjusts\" the joints of your back.   Acupuncture - This is when someone who knows traditional Chinese medicine inserts tiny needles through your skin to block pain signals.   Massage therapy - The massage therapist manipulates your muscles and soft tissues to decrease muscle tension and increase relaxation.  What follow-up care do I need? -- Your doctor or nurse will tell you if you need to make a follow-up appointment. If so, make sure that you know when and where to go. The doctor might suggest that you see a physical therapist to learn exercises to help with your back pain.  When should I call the doctor? -- Call for emergency help right away (in the [place] and [place], call 9-1-1) if:   You are unable to walk, or cannot control your bowels or bladder.   You develop a fever of 100.4°F (38°C) or higher, chills, or night sweats.  Call your doctor for advice if:   Your legs are numb, weak, or tingly.   Your pain is getting worse, even with medicines and rest.   You develop a new rash.  All topics are updated as new evidence becomes available and our peer review " process is complete.  This topic retrieved from [employer] on: May 15, 2024.  Topic 993129 Version 1.0  Release: 32.4.3 - C32.134  © 2024 UpToDate, Inc. and/or its affiliates. All rights reserved.  figure 1: Anatomy of the back     This drawing shows the different parts of the back. Back pain can be caused by problems with the muscles, ligaments, discs, bones (vertebrae), or nerves.  Graphic 56594 Version 6.0  Consumer Information Use and Disclaimer   Disclaimer: This generalized information is a limited summary of diagnosis, treatment, and/or medication information. It is not meant to be comprehensive and should be used as a tool to help the user understand and/or assess potential diagnostic and treatment options. It does NOT include all information about conditions, treatments, medications, side effects, or risks that may apply to a specific patient. It is not intended to be medical advice or a substitute for the medical advice, diagnosis, or treatment of a health care provider based on the health care provider's examination and assessment of a patient's specific and unique circumstances. Patients must speak with a health care provider for complete information about their health, medical questions, and treatment options, including any risks or benefits regarding use of medications. This information does not endorse any treatments or medications as safe, effective, or approved for treating a specific patient. UpToDate, Inc. and its affiliates disclaim any warranty or liability relating to this information or the use thereof.The use of this information is governed by the Terms of Use, available at https://www.wolterskluwer.com/en/know/clinical-effectiveness-terms. 2024© UpToDate, Inc. and its affiliates and/or licensors. All rights reserved.  Copyright   © 2024 UpToDate, Inc. and/or its affiliates. All rights reserved.

## 2024-10-28 NOTE — ASSESSMENT & PLAN NOTE
Lab Results   Component Value Date    LDLCALC 59 08/05/2024      Lab Results   Component Value Date    ALT 13 08/05/2024    ALT 16 02/07/2024   LDL very well-controlled no side effects ALT normal    Agree and continue manage medication as follow  Lipitor 40 mg daily with low-fat low-cholesterol diet will monitor closely

## 2024-10-28 NOTE — PROGRESS NOTES
Dr. Matthews's Office Visit Note  10/28/24     Shay Aguilar 69 y.o. male MRN: [id number]  : 1955    Assessment:     1. Mixed hyperlipidemia  Assessment & Plan:  Lab Results   Component Value Date    LDLCALC 59 2024      Lab Results   Component Value Date    ALT 13 2024    ALT 16 2024   LDL very well-controlled no side effects ALT normal    Agree and continue manage medication as follow  Lipitor 40 mg daily with low-fat low-cholesterol diet will monitor closely    2. Lumbar radiculopathy  Assessment & Plan:  Lower back for last 6 months nonradiating but more so with the movement been using over-the-counter aspirin without any improvement    Will start physical therapy    Ibuprofen 400  3 times a day as needed  Orders:  -     Ambulatory Referral to Physical Therapy; Future  3. Chronic gastric ulcer without hemorrhage and without perforation  Assessment & Plan:  Symptoms controlled    Continue Pepcid 40 mg daily instructed about the diet followed by GI        Discussion Summary and Plan:  Today's care plan and medications were reviewed with patient in detail and all their questions answered to their satisfaction.    Chief Complaint   Patient presents with    Follow-up     Lumbar back pain - referral to PT      Subjective:  Came in follow-up for the chronic medical condition especially for follow-up for the lab recently the Lipitor doses were increased now complaining lower back pain for more than 6 months off-and-on limited to the lower back nonradiating more so with the movement for detail refer to assessment plan visit diagnosis        The following portions of the patient's history were reviewed and updated as appropriate: allergies, current medications, past family history, past medical history, past social history, past surgical history and problem list.    Review of Systems   Constitutional:  Positive for activity change. Negative for appetite change, chills, diaphoresis, fatigue, fever and  unexpected weight change.   HENT:  Positive for ear pain. Negative for congestion, dental problem, drooling, ear discharge, facial swelling, hearing loss, mouth sores, nosebleeds, postnasal drip, rhinorrhea, sinus pressure, sneezing, sore throat, tinnitus, trouble swallowing and voice change.    Eyes:  Negative for photophobia, pain, discharge, redness, itching and visual disturbance.   Respiratory:  Negative for apnea, cough, choking, chest tightness, shortness of breath, wheezing and stridor.    Cardiovascular:  Negative for chest pain, palpitations and leg swelling.   Gastrointestinal:  Negative for abdominal distention, abdominal pain, anal bleeding, blood in stool, constipation, diarrhea, nausea, rectal pain and vomiting.   Endocrine: Negative for cold intolerance, heat intolerance, polydipsia, polyphagia and polyuria.   Genitourinary:  Negative for decreased urine volume, difficulty urinating, dysuria, enuresis, flank pain, frequency, genital sores, hematuria and urgency.   Musculoskeletal:  Positive for back pain. Negative for arthralgias, gait problem, joint swelling, myalgias, neck pain and neck stiffness.   Skin:  Negative for color change, pallor, rash and wound.   Allergic/Immunologic: Negative.  Negative for environmental allergies, food allergies and immunocompromised state.   Neurological:  Negative for dizziness, tremors, seizures, syncope, facial asymmetry, speech difficulty, weakness, light-headedness, numbness and headaches.   Psychiatric/Behavioral:  Negative for agitation, behavioral problems, confusion, decreased concentration, dysphoric mood, hallucinations, self-injury, sleep disturbance and suicidal ideas. The patient is not nervous/anxious and is not hyperactive.          Historical Information   Patient Active Problem List   Diagnosis    Dizziness    Headaches, cluster    Tinnitus of both ears    Sinusitis chronic, frontal    Gastroesophageal reflux disease with esophagitis without  hemorrhage    Hx of adenomatous colonic polyps    History of stroke without residual deficits    Chronic gastric ulcer without hemorrhage and without perforation    Stage 3a chronic kidney disease (HCC)    Mixed hyperlipidemia    Sensorineural hearing loss (SNHL), bilateral    Other sleep apnea    Hypersomnia    Snoring    Periodic limb movements of sleep    Other fatigue    Gastroesophageal reflux disease without esophagitis    Primary hypertension    Skin tag    Lumbar radiculopathy     Past Medical History:   Diagnosis Date    Cataract     Colon polyp     Dizziness     Ear problems 2020    GERD (gastroesophageal reflux disease)     Hyperlipidemia     Lightheadedness     Seasonal allergies     Tinnitus     Wears glasses      Past Surgical History:   Procedure Laterality Date    COLONOSCOPY      WA COLONOSCOPY FLX DX W/COLLJ SPEC WHEN PFRMD N/A 2016    Procedure: COLONOSCOPY;  Surgeon: Gorge Deleon MD;  Location: [facility];  Service: Gastroenterology    WA EGD TRANSORAL BIOPSY SINGLE/MULTIPLE N/A 2016    Procedure: ESOPHAGOGASTRODUODENOSCOPY (EGD);  Surgeon: Gorge Deleon MD;  Location: [facility];  Service: Gastroenterology    TONSILLECTOMY N/A     child     Social History     Substance and Sexual Activity   Alcohol Use Yes    Comment: on occ     Social History     Substance and Sexual Activity   Drug Use No     Social History     Tobacco Use   Smoking Status Former    Current packs/day: 0.00    Average packs/day: 0.5 packs/day for 8.2 years (4.1 ttl pk-yrs)    Types: Cigarettes    Start date: 1975    Quit date: 1983    Years since quittin.6   Smokeless Tobacco Never     Family History   Problem Relation Age of Onset    Arthritis Mother     Alzheimer's disease Mother     Other Mother         smoker-phlegm    Alcohol abuse Father     Suicidality Father      Health Maintenance Due   Topic    Hepatitis C Screening     RSV Vaccine Age 60+ Years (1 - 1-dose 60+ series)    Pneumococcal  "Vaccine: 65+ Years (1 of 1 - PCV)    Influenza Vaccine (1)    COVID-19 Vaccine (4 - 2023-24 season)    Zoster Vaccine (2 of 2)    Depression Screening       Meds/Allergies       Current Outpatient Medications:     clindamycin (CLEOCIN) 150 mg capsule, TAKE 1 CAPSULE BY MOUTH EVERY 8 HOURS UNTIL FINISHED, Disp: , Rfl:     Cyanocobalamin (VITAMIN B 12 PO), Take by mouth in the morning, Disp: , Rfl:     dexamethasone (DECADRON) 4 mg tablet, TAKE 1 TABLET BY MOUTH STAT AND 1 TABLET BY MOUTH 12 HOURS LATER, Disp: , Rfl:     ergocalciferol (VITAMIN D2) 50,000 units, Take 1 capsule (50,000 Units total) by mouth once a week, Disp: 12 capsule, Rfl: 1    famotidine (PEPCID) 40 MG tablet, Take 1 tablet (40 mg total) by mouth daily, Disp: 90 tablet, Rfl: 1    simvastatin (ZOCOR) 40 mg tablet, Take 1 tablet (40 mg total) by mouth daily at bedtime, Disp: 90 tablet, Rfl: 3    polyethylene glycol (GOLYTELY) 4000 mL solution, Take 4,000 mL by mouth once for 1 dose, Disp: 4000 mL, Rfl: 0      Objective:    Vitals:   /82   Pulse 64   Temp 97.7 °F (36.5 °C)   Resp 15   Ht 5' 11\" (1.803 m)   Wt 89.8 kg (198 lb)   SpO2 98%   BMI 27.62 kg/m²   Body mass index is 27.62 kg/m².  Vitals:    10/28/24 1405   Weight: 89.8 kg (198 lb)       Physical Exam  Vitals and nursing note reviewed.   Constitutional:       General: He is not in acute distress.     Appearance: He is well-developed. He is not ill-appearing, toxic-appearing or diaphoretic.   HENT:      Head: Normocephalic and atraumatic.      Right Ear: External ear normal.      Left Ear: External ear normal.      Nose: Nose normal.      Mouth/Throat:      Pharynx: No oropharyngeal exudate.   Eyes:      General: Lids are normal. Lids are everted, no foreign bodies appreciated. No scleral icterus.        Right eye: No discharge.         Left eye: No discharge.      Conjunctiva/sclera: Conjunctivae normal.      Pupils: Pupils are equal, round, and reactive to light.   Neck:      " Thyroid: No thyromegaly.      Vascular: Normal carotid pulses. No carotid bruit, hepatojugular reflux or JVD.      Trachea: No tracheal tenderness or tracheal deviation.   Cardiovascular:      Rate and Rhythm: Normal rate and regular rhythm.      Pulses: Normal pulses.      Heart sounds: Normal heart sounds. No murmur heard.     No friction rub. No gallop.   Pulmonary:      Effort: Pulmonary effort is normal. No respiratory distress.      Breath sounds: Normal breath sounds. No stridor. No wheezing or rales.   Chest:      Chest wall: No tenderness.   Abdominal:      General: Bowel sounds are normal. There is no distension.      Palpations: Abdomen is soft. There is no mass.      Tenderness: There is no abdominal tenderness. There is no guarding or rebound.   Musculoskeletal:         General: No tenderness or deformity. Normal range of motion.      Cervical back: Normal range of motion and neck supple. No edema, erythema or rigidity. No spinous process tenderness or muscular tenderness. Normal range of motion.   Lymphadenopathy:      Head:      Right side of head: No submental, submandibular, tonsillar, preauricular or posterior auricular adenopathy.      Left side of head: No submental, submandibular, tonsillar, preauricular, posterior auricular or occipital adenopathy.      Cervical: No cervical adenopathy.      Right cervical: No superficial, deep or posterior cervical adenopathy.     Left cervical: No superficial, deep or posterior cervical adenopathy.      Upper Body:      Right upper body: No pectoral adenopathy.      Left upper body: No pectoral adenopathy.   Skin:     General: Skin is warm and dry.      Coloration: Skin is not pale.      Findings: No erythema or rash.   Neurological:      General: No focal deficit present.      Mental Status: He is alert and oriented to person, place, and time.      Cranial Nerves: No cranial nerve deficit.      Sensory: No sensory deficit.      Motor: No tremor, abnormal  muscle tone or seizure activity.      Coordination: Coordination normal.      Gait: Gait normal.      Deep Tendon Reflexes: Reflexes are normal and symmetric. Reflexes normal.   Psychiatric:         Behavior: Behavior normal.         Thought Content: Thought content normal.         Judgment: Judgment normal.         Lab Review   No visits with results within 2 Month(s) from this visit.   Latest known visit with results is:   Appointment on 08/05/2024   Component Date Value Ref Range Status    Vit D, 25-Hydroxy 08/05/2024 31.9  30.0 - 100.0 ng/mL Final    Vitamin D guidelines established by Clinical Guidelines Subcommittee  of the Endocrine Society Task Force, 2011    Deficiency <20ng/ml   Insufficiency 20-30ng/ml   Sufficient  ng/ml     Cholesterol 08/05/2024 134  See Comment mg/dL Final    Cholesterol:         Pediatric <18 Years        Desirable          <170 mg/dL      Borderline High    170-199 mg/dL      High               >=200 mg/dL        Adult >=18 Years            Desirable         <200 mg/dL      Borderline High   200-239 mg/dL      High              >239 mg/dL      Triglycerides 08/05/2024 77  See Comment mg/dL Final    Triglyceride:     0-9Y            <75mg/dL     10Y-17Y         <90 mg/dL       >=18Y     Normal          <150 mg/dL     Borderline High 150-199 mg/dL     High            200-499 mg/dL        Very High       >499 mg/dL    Specimen collection should occur prior to Metamizole administration due to the potential for falsely depressed results.    HDL, Direct 08/05/2024 60  >=40 mg/dL Final    LDL Calculated 08/05/2024 59  0 - 100 mg/dL Final    LDL Cholesterol:     Optimal           <100 mg/dl     Near Optimal      100-129 mg/dl     Above Optimal       Borderline High 130-159 mg/dl       High            160-189 mg/dl       Very High       >189 mg/dl         This screening LDL is a calculated result.   It does not have the accuracy of the Direct Measured LDL in the monitoring of patients  "with hyperlipidemia and/or statin therapy.   Direct Measure LDL (OUS087) must be ordered separately in these patients.    Sodium 08/05/2024 141  135 - 147 mmol/L Final    Potassium 08/05/2024 4.3  3.5 - 5.3 mmol/L Final    Chloride 08/05/2024 107  96 - 108 mmol/L Final    CO2 08/05/2024 29  21 - 32 mmol/L Final    ANION GAP 08/05/2024 5  4 - 13 mmol/L Final    BUN 08/05/2024 17  5 - 25 mg/dL Final    Creatinine 08/05/2024 1.12  0.60 - 1.30 mg/dL Final    Standardized to IDMS reference method    Glucose, Fasting 08/05/2024 83  65 - 99 mg/dL Final    Calcium 08/05/2024 9.1  8.4 - 10.2 mg/dL Final    AST 08/05/2024 17  13 - 39 U/L Final    ALT 08/05/2024 13  7 - 52 U/L Final    Specimen collection should occur prior to Sulfasalazine administration due to the potential for falsely depressed results.     Alkaline Phosphatase 08/05/2024 75  34 - 104 U/L Final    Total Protein 08/05/2024 6.8  6.4 - 8.4 g/dL Final    Albumin 08/05/2024 4.2  3.5 - 5.0 g/dL Final    Total Bilirubin 08/05/2024 0.62  0.20 - 1.00 mg/dL Final    Use of this assay is not recommended for patients undergoing treatment with eltrombopag due to the potential for falsely elevated results.  N-acetyl-p-benzoquinone imine (metabolite of Acetaminophen) will generate erroneously low results in samples for patients that have taken an overdose of Acetaminophen.    eGFR 08/05/2024 67  ml/min/1.73sq m Final         There are no Patient Instructions on file for this visit.     Alber Matthews MD        \"This note has been constructed using a voice recognition system.Therefore there may be syntax, spelling, and/or grammatical errors. Please call if you have any questions. \"  "

## 2024-10-28 NOTE — ASSESSMENT & PLAN NOTE
Lower back for last 6 months nonradiating but more so with the movement been using over-the-counter aspirin without any improvement    Will start physical therapy    Ibuprofen 400  3 times a day as needed

## 2024-11-11 ENCOUNTER — EVALUATION (OUTPATIENT)
Dept: PHYSICAL THERAPY | Facility: CLINIC | Age: 69
End: 2024-11-11
Payer: COMMERCIAL

## 2024-11-11 DIAGNOSIS — M54.16 LUMBAR RADICULOPATHY: Primary | ICD-10-CM

## 2024-11-11 PROCEDURE — 97161 PT EVAL LOW COMPLEX 20 MIN: CPT | Performed by: PHYSICAL THERAPIST

## 2024-11-11 NOTE — PROGRESS NOTES
PT Evaluation   Today's date: 2024  Patient name: Shay Aguilar  : 1955  MRN: [id number]  Referring provider: Alber Matthews MD  Dx:   Encounter Diagnosis     ICD-10-CM    1. Lumbar radiculopathy  M54.16 Ambulatory Referral to Physical Therapy            CASE SUMMARY:   Shay Aguilar is a 69 y.o. year old male who presents to OPPT upon referral from primary  secondary to c/o lumbar radiculopathy.  Shay reports onset of symptoms ~  3 months ago as a result of lifting something heavy and sitting prolong periods.  Current symptom location includes drew aspect of lumbar spine  and patient describes  current symptoms as: achy.  Symptoms are : intermittent  Pain level:  Current: 7/10 and with ADL's 8/10.  Symptoms improve with: press up, and worsen with lifting.  Overall symptom progression at this time is improving.   Previous injury to this area: none  Previous treatment includes: none  Diagnostic testing includes: nothing recent, US right leg to r/o DVT neg      PMHx includes: See chart for full details with medications, allergies, past family history, past medical history, social history, past surgical history and problem list. All topics were reviewed.      Functionally, at baseline, Shay is independent with all basic ADL's and advanced ADL's.  Currently, Shay is limited in the following activities: unable to perform heavy lifting, pain with sitting for 12 hour shift.      Shay is lives with son in a 1 story home   Recreational activities include: nothing .  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     Patient goal(s) for physical therapy is: painless    Concurrent Complaints:  Red flags present: Tingling and Numbness 1 x week in right leg: right foot: resolves with he lifts his leg up/elevates it has occurred for past year    Reflexes: NT    Posture   Sitting:+ slouched    Standing: wieght through forefoot     LLD:iliac crest =    Skin creases:inc at lumbar on left compared to  "rgiht    Shift:neg    Scoliosis: mild lateral curve upper lumbar to right     Knees: right knee flexed       March test neg    Gait: no pain on level surfaces    Assistive device: none   Trunk movement: wnl   Stride length: wnl   Trendelenburg: very mild to left   Foot drop: neg    Functional movements:   Squat: able to tap standard chair  (-) pain   SLS: left: 13:47 sec   Right : 5:82 sec     Transfers sit/stand: independent   bed mobility independent    Lumbar AROM:    Flexion: wnl LOM (-) pain   Extension: moderate LOM standing (-) pain   Side bend: Right: wnl LOM (-) pain     Left: wnl LOM (-) pain    Rotation: drew wnl no pain     Repeated motions: BL: no symptoms  Standing flexion:    Effect: produced pain right lumbar spine NW  Standing extension:   Effect: no pain \" stretches the muscles\"  Supine flexion:    Effect: NT  Prone extension:    Effect: feels good    Myotomes   L2 (hip flexion): Right 4+/5  Left: 4+/5   L3 (knee extension): Right: 4+/5 Left: 4+/5   L4 (ankle dorsiflexion): Right: 4+/5 Left: 4+/5   L5 (hallux extension): Right: 4+/5  Left: 4+/5   S1 (ankle plantarflexion): Right: 4+/5 Left: 4+/5   S2 (hip extensors): Right: 4+/5 Left: 4+/5    LE ROM: NT          Dermatomes: wnl to light touch drew LE          FOTO score is 55% with a 72% prediction in function.       Assessment  Assessment details: Patient is a 69 y.o. Male who presents to skilled outpatient PT for the diagnosis of   Encounter Diagnosis     ICD-10-CM    1. Lumbar radiculopathy  M54.16 Ambulatory Referral to Physical Therapy      . Patient presents with reduced ROM, reduced posture all contributing to patients current symptoms including sedentary job with increased hours. Patient responded favorably to repeated extension in standing and prone and vocalized this helped him prior to entering PT.patient will benefit form skilled PT for work ergonomic education, clarissa extension based program and stab,stretching prn .   Patient " vocalized a good understanding of  PLOC and HEP issued. Shay would benefit from skilled physical therapy services to address the functional limitations and progress towards prior level of function, to meet stated goals,  and independence with home exercise program.  Prognosis for successful rehab outcome is good with consistent participation in therapy and carryover of HEP and PLOC.No further referral is necessary at this time based upon examination results. Patient education performed during today's session included: Hep and PLOC.     Impairments: Abnormal or restricted ROM, Activity intolerance, Impaired balance, Lacks appropriate HEP, Poor posture, and Pain with function  Understanding of Dx/Px/POC: Good  Prognosis: Good    STG  + Patient will report a 35% improvement in symptoms (2-3 weeks)   + Patient will be independent in basic HEP (2-3 weeks)  + Patient will demonstrate good posture with verbal cuing   (2-3 weeks)  + Patient will demonstrate an increase in range of motion  lumbar extension to  minimally limited (2-3 weeks)  + Patient will report increased ease siting due to a reduction in pain (2-3 weeks)    LTG:  + Patient will report a 70% improvement in symptoms (4-6 weeks)   + Patient will increase FOTO score by 10 points (8 sessions)   + Patient will be independent in comprehensive HEP (4-6 weeks)  + Patient will demonstrate proper lifting technique x 3 lifts (4-6 weeks)    Plan  Plan details: HEP development, stretching as needed per objective findings, strengthening core/lower quadrant, A/AA/PROM lumbar/hip motions, joint mobilizations prn, posture education, STM/MI as needed to reduce muscle tension per objective findings, muscle reeducation per objective findings, dynamic stabilization, PLOC discussed and agreed upon with patient clarissa extension program, stabilization    Patient would benefit from: Skilled PT    Planned therapy interventions: Abdominal trunk stabilization, Balance, Body  mechanics training, Dry Needling, HEP, Joint mobilization, Neuromuscular re-education, Patient education, Strengthening, Stretching, Therapeutic exercises, and Work reintegration  Frequency: 2x/wk  Duration in weeks: 4-6  Plan of Care beginning date: 11/11/24  Plan of Care expiration date: 6 weeks - 12/23/2024  Treatment plan discussed with: Patient    Patient verbalized understanding of POC. Please contact me if you have any questions or recommendations. Thank you for the referral and the opportunity to share in Shay Aguilar's care.            Past Medical History:   Diagnosis Date    Cataract     Colon polyp     Dizziness     Ear problems 2020    GERD (gastroesophageal reflux disease)     Hyperlipidemia     Lightheadedness     Seasonal allergies     Tinnitus     Wears glasses        Eval/ Re-eval Auth #/ Referral # Total visits Start date  Expiration date Total active units  Total manual units  PT only or  PT+OT?   11/11/2024 AUTH RQD         12/11/24             Dx: lumbar radiculopathy         Insurance :         Visits: 1 2 3 4 5   Manual: 11/11/2024       STM/MI: prn        Joint mobs:  lspine                                        Ther ex/NMR:        Manual stretching: hamstring, hip flexor, piriformis/glute        Rec bike/  Nustep/treadmill                        Tim test stretch        hamstring stretch                                EIS instruct       Prone press up instruct       Prone press up with exhale                Sit/stand with hip hinge                TA activation (stab cuff)        + marching        +hip abd iso        +hip add iso        + alternating LE extension        Palloff press                Glute set + hip extension        Glute set + bridge                Therapeutic Activity:  Posture sitting education       Reevaluation                Gait Training:                 HEP:                 Modalities        MH        ice        Total time:               Access Code: [email or web address]  URL:  [email or web address]  Date: 11/11/2024  Prepared by: Kathy Gardiner    Exercises  - Prone Press Up  - 2-3 x daily - 7 x weekly - 1 sets - 10 reps  - Standing Lumbar Extension with Counter  - 10 x daily - 7 x weekly - 1-2 sets - 10 reps

## 2024-11-13 ENCOUNTER — OFFICE VISIT (OUTPATIENT)
Dept: PHYSICAL THERAPY | Facility: CLINIC | Age: 69
End: 2024-11-13
Payer: COMMERCIAL

## 2024-11-13 DIAGNOSIS — M54.16 LUMBAR RADICULOPATHY: Primary | ICD-10-CM

## 2024-11-13 PROCEDURE — 97110 THERAPEUTIC EXERCISES: CPT | Performed by: PHYSICAL THERAPIST

## 2024-11-13 NOTE — PROGRESS NOTES
Daily Note         Today's date: 2024  Patient name: Shay Aguilar  : 1955  MRN: [id number]  Referring provider: Alber Matthews MD  Dx:   Encounter Diagnosis     ICD-10-CM    1. Lumbar radiculopathy  M54.16           Subjective: Shay Aguilar reports he is now using lumbar cushion, and states he is getting up more frequently at work.  States symptoms are better.  No pain entering today.        Objective: See treatment diary below    Assessment:   patient needs increased cuing for proper form with there ex especially glute sets, bridging for proper muscle recruitment and form.   Patient denied pain throughout program and upon exiting clinic. Reiterated to patient need for stretching on a daily basis for lumbar extension due to increased sitting throughout the day. Patient with moderate LOM in lumbar extension with prone press ups.  Added glute strengthening to assist with climbing hills.    Plan:  progress as tolerated to improve lumbar extension ROM and strengthening glutes.             Past Medical History:   Diagnosis Date    Cataract     Colon polyp     Dizziness     Ear problems     GERD (gastroesophageal reflux disease)     Hyperlipidemia     Lightheadedness     Seasonal allergies     Tinnitus     Wears glasses        Eval/ Re-eval Auth #/ Referral # Total visits Start date  Expiration date Total active units  Total manual units  PT only or  PT+OT?   2024 AUTH RQD         24             Dx: lumbar radiculopathy         Insurance :         Visits: 1 2 3 4 5   Manual: 2024      STM/MI: prn        Joint mobs:  lspine                                        Ther ex/NMR:        Manual stretching: hamstring, hip flexor, piriformis/glute        Rec bike/  Nustep/treadmill                        Tim test stretch        hamstring stretch  Supine 10 sec x 5        Piriformis stretch  5 sec x 10  drew                       EIS instruct rev      Prone press up instruct  10 x 2       Prone press up with exhale                        Sit/stand with hip hinge  10 x 2      Leg press: drew  105 ln 10 x  115 lb 10 x 2      TA activation (stab cuff)        + marching        +hip abd iso        +hip add iso        + alternating LE extension        Palloff press        Glute set  5 sec x 20      Glute set + hip extension  --      Glute set + bridge  10 x 2               Therapeutic Activity:  Posture sitting education       Reevaluation                Gait Training:                 HEP:                 Modalities        MH        ice        Total time:               Access Code: [email or web address]  URL: [email or web address]  Date: 11/11/2024  Prepared by: Kathy Gardiner    Exercises  - Prone Press Up  - 2-3 x daily - 7 x weekly - 1 sets - 10 reps  - Standing Lumbar Extension with Counter  - 10 x daily - 7 x weekly - 1-2 sets - 10 reps

## 2024-11-18 ENCOUNTER — TELEPHONE (OUTPATIENT)
Dept: PHYSICAL THERAPY | Facility: CLINIC | Age: 69
End: 2024-11-18

## 2024-11-18 NOTE — TELEPHONE ENCOUNTER
Phoned patient due to his missed appointment this am at *am in PT.  Left message and requested patient return call to confirm next appointment.  Phone number left on voice mail.

## 2024-11-20 ENCOUNTER — APPOINTMENT (OUTPATIENT)
Dept: PHYSICAL THERAPY | Facility: CLINIC | Age: 69
End: 2024-11-20
Payer: COMMERCIAL

## 2024-11-25 ENCOUNTER — APPOINTMENT (OUTPATIENT)
Dept: PHYSICAL THERAPY | Facility: CLINIC | Age: 69
End: 2024-11-25
Payer: COMMERCIAL

## 2024-11-25 DIAGNOSIS — E78.2 MIXED HYPERLIPIDEMIA: ICD-10-CM

## 2024-11-26 RX ORDER — SIMVASTATIN 40 MG
40 TABLET ORAL
Qty: 90 TABLET | Refills: 1 | Status: SHIPPED | OUTPATIENT
Start: 2024-11-26

## 2024-11-29 ENCOUNTER — APPOINTMENT (OUTPATIENT)
Dept: PHYSICAL THERAPY | Facility: CLINIC | Age: 69
End: 2024-11-29
Payer: COMMERCIAL

## 2024-12-10 ENCOUNTER — OFFICE VISIT (OUTPATIENT)
Dept: INTERNAL MEDICINE CLINIC | Facility: CLINIC | Age: 69
End: 2024-12-10
Payer: COMMERCIAL

## 2024-12-10 VITALS
HEART RATE: 53 BPM | OXYGEN SATURATION: 97 % | TEMPERATURE: 97.7 F | RESPIRATION RATE: 15 BRPM | DIASTOLIC BLOOD PRESSURE: 68 MMHG | HEIGHT: 71 IN | WEIGHT: 200 LBS | BODY MASS INDEX: 28 KG/M2 | SYSTOLIC BLOOD PRESSURE: 108 MMHG

## 2024-12-10 DIAGNOSIS — I10 PRIMARY HYPERTENSION: ICD-10-CM

## 2024-12-10 DIAGNOSIS — R05.1 ACUTE COUGH: Primary | ICD-10-CM

## 2024-12-10 DIAGNOSIS — J20.9 ACUTE INFECTIVE TRACHEOBRONCHITIS: ICD-10-CM

## 2024-12-10 LAB
SARS-COV-2 AG UPPER RESP QL IA: NEGATIVE
SL AMB POCT RAPID FLU A: NORMAL
SL AMB POCT RAPID FLU B: NORMAL
VALID CONTROL: NORMAL

## 2024-12-10 PROCEDURE — 99213 OFFICE O/P EST LOW 20 MIN: CPT | Performed by: INTERNAL MEDICINE

## 2024-12-10 PROCEDURE — 87811 SARS-COV-2 COVID19 W/OPTIC: CPT | Performed by: INTERNAL MEDICINE

## 2024-12-10 PROCEDURE — 87804 INFLUENZA ASSAY W/OPTIC: CPT | Performed by: INTERNAL MEDICINE

## 2024-12-10 PROCEDURE — G2211 COMPLEX E/M VISIT ADD ON: HCPCS | Performed by: INTERNAL MEDICINE

## 2024-12-10 RX ORDER — METHYLPREDNISOLONE 4 MG/1
TABLET ORAL
Qty: 21 EACH | Refills: 0 | Status: SHIPPED | OUTPATIENT
Start: 2024-12-10

## 2024-12-10 RX ORDER — AZITHROMYCIN 250 MG/1
TABLET, FILM COATED ORAL
Qty: 6 TABLET | Refills: 0 | Status: SHIPPED | OUTPATIENT
Start: 2024-12-10 | End: 2024-12-15

## 2024-12-10 RX ORDER — CODEINE PHOSPHATE AND GUAIFENESIN 10; 100 MG/5ML; MG/5ML
5 SOLUTION ORAL 3 TIMES DAILY PRN
Qty: 118 ML | Refills: 0 | Status: SHIPPED | OUTPATIENT
Start: 2024-12-10

## 2024-12-10 NOTE — PROGRESS NOTES
Dr. Matthews's Office Visit Note  12/10/24     Shay Aguilar 69 y.o. male MRN: [id number]  : 1955    Assessment:     1. Acute cough  -     POCT Rapid Covid Ag  -     POCT rapid flu A and B  2. Acute infective tracheobronchitis  Assessment & Plan:  Came in symptoms for 2 days started with sore throat nasal congestion with coughing thick yellowish nasal discharge and thick yellowish sputum with general aches pains and headache initially low-grade fever which subsided no chest pain no difficulty breathing no nausea vomiting diarrhea    Tested negative for COVID tested negative for influenza A tested negative for influenza B    Will treat as follows    Zithromax to use as directed    Medrol Dosepak to use as directed    Cough medicine with codeine to use as instructed  Orders:  -     azithromycin (Zithromax) 250 mg tablet; Take 2 tablets (500 mg total) by mouth daily for 1 day, THEN 1 tablet (250 mg total) daily for 4 days.  -     methylPREDNISolone 4 MG tablet therapy pack; Use as directed on package  -     guaiFENesin-codeine (Guaiatussin AC) 100-10 mg/5 mL oral solution; Take 5 mL by mouth 3 (three) times a day as needed for cough  3. Primary hypertension  Assessment & Plan:  Blood pressure controlled asymptomatic    Agree continue low-salt diet keep monitoring no further intervention needed        Discussion Summary and Plan:  Today's care plan and medications were reviewed with patient in detail and all their questions answered to their satisfaction.    Chief Complaint   Patient presents with   • URI     Heavy cough, runny nose, congestion started 2 days ago      Subjective:  Came in symptoms for 2 days started with sore throat nasal congestion with coughing thick yellowish nasal discharge and thick yellowish sputum with general aches pains and headache initially low-grade fever which subsided no chest pain no difficulty breathing no nausea vomiting diarrhea         URI   Associated symptoms include congestion and  coughing. Pertinent negatives include no abdominal pain, chest pain, diarrhea, dysuria, headaches, nausea, neck pain, rash, rhinorrhea, sneezing, sore throat, vomiting or wheezing.       The following portions of the patient's history were reviewed and updated as appropriate: allergies, current medications, past family history, past medical history, past social history, past surgical history and problem list.    Review of Systems   Constitutional:  Positive for activity change. Negative for appetite change, chills, diaphoresis, fatigue, fever and unexpected weight change.   HENT:  Positive for congestion. Negative for dental problem, drooling, ear discharge, facial swelling, hearing loss, mouth sores, nosebleeds, postnasal drip, rhinorrhea, sinus pressure, sneezing, sore throat, tinnitus, trouble swallowing and voice change.    Eyes:  Negative for photophobia, pain, discharge, redness, itching and visual disturbance.   Respiratory:  Positive for cough. Negative for apnea, choking, chest tightness, shortness of breath, wheezing and stridor.    Cardiovascular:  Negative for chest pain, palpitations and leg swelling.   Gastrointestinal:  Negative for abdominal distention, abdominal pain, anal bleeding, blood in stool, constipation, diarrhea, nausea, rectal pain and vomiting.   Endocrine: Negative for cold intolerance, heat intolerance, polydipsia, polyphagia and polyuria.   Genitourinary:  Negative for decreased urine volume, difficulty urinating, dysuria, enuresis, flank pain, frequency, genital sores, hematuria and urgency.   Musculoskeletal:  Negative for arthralgias, back pain, gait problem, joint swelling, myalgias, neck pain and neck stiffness.   Skin:  Negative for color change, pallor, rash and wound.   Allergic/Immunologic: Negative.  Negative for environmental allergies, food allergies and immunocompromised state.   Neurological:  Negative for dizziness, tremors, seizures, syncope, facial asymmetry, speech  difficulty, weakness, light-headedness, numbness and headaches.   Psychiatric/Behavioral:  Negative for agitation, behavioral problems, confusion, decreased concentration, dysphoric mood, hallucinations, self-injury, sleep disturbance and suicidal ideas. The patient is not nervous/anxious and is not hyperactive.          Historical Information   Patient Active Problem List   Diagnosis   • Dizziness   • Headaches, cluster   • Tinnitus of both ears   • Sinusitis chronic, frontal   • Gastroesophageal reflux disease with esophagitis without hemorrhage   • Hx of adenomatous colonic polyps   • History of stroke without residual deficits   • Chronic gastric ulcer without hemorrhage and without perforation   • Stage 3a chronic kidney disease (HCC)   • Mixed hyperlipidemia   • Sensorineural hearing loss (SNHL), bilateral   • Other sleep apnea   • Hypersomnia   • Snoring   • Periodic limb movements of sleep   • Other fatigue   • Gastroesophageal reflux disease without esophagitis   • Primary hypertension   • Skin tag   • Lumbar radiculopathy   • Acute infective tracheobronchitis     Past Medical History:   Diagnosis Date   • Cataract    • Colon polyp    • Dizziness    • Ear problems 2020   • GERD (gastroesophageal reflux disease)    • Hyperlipidemia    • Lightheadedness    • Seasonal allergies    • Tinnitus    • Wears glasses      Past Surgical History:   Procedure Laterality Date   • COLONOSCOPY     • NH COLONOSCOPY FLX DX W/COLLJ SPEC WHEN PFRMD N/A 2/29/2016    Procedure: COLONOSCOPY;  Surgeon: Gorge Deleon MD;  Location: [facility];  Service: Gastroenterology   • NH EGD TRANSORAL BIOPSY SINGLE/MULTIPLE N/A 2/29/2016    Procedure: ESOPHAGOGASTRODUODENOSCOPY (EGD);  Surgeon: Gorge Deleon MD;  Location: [facility];  Service: Gastroenterology   • TONSILLECTOMY N/A     child     Social History     Substance and Sexual Activity   Alcohol Use Yes    Comment: on occ     Social History     Substance and Sexual Activity  "  Drug Use No     Social History     Tobacco Use   Smoking Status Former   • Current packs/day: 0.00   • Average packs/day: 0.5 packs/day for 8.2 years (4.1 ttl pk-yrs)   • Types: Cigarettes   • Start date: 1975   • Quit date: 1983   • Years since quittin.8   Smokeless Tobacco Never     Family History   Problem Relation Age of Onset   • Arthritis Mother    • Alzheimer's disease Mother    • Other Mother         smoker-phlegm   • Alcohol abuse Father    • Suicidality Father      Health Maintenance Due   Topic   • Hepatitis C Screening    • Pneumococcal Vaccine: 65+ Years (1 of  - PCV)   • Influenza Vaccine (1)   • COVID-19 Vaccine ( season)   • PT PLAN OF CARE    • Depression Screening    • Medicare Annual Wellness Visit (AWV)       Meds/Allergies       Current Outpatient Medications:   •  azithromycin (Zithromax) 250 mg tablet, Take 2 tablets (500 mg total) by mouth daily for 1 day, THEN 1 tablet (250 mg total) daily for 4 days., Disp: 6 tablet, Rfl: 0  •  Cyanocobalamin (VITAMIN B 12 PO), Take by mouth in the morning, Disp: , Rfl:   •  dexamethasone (DECADRON) 4 mg tablet, TAKE 1 TABLET BY MOUTH STAT AND 1 TABLET BY MOUTH 12 HOURS LATER, Disp: , Rfl:   •  ergocalciferol (VITAMIN D2) 50,000 units, Take 1 capsule (50,000 Units total) by mouth once a week, Disp: 12 capsule, Rfl: 1  •  famotidine (PEPCID) 40 MG tablet, Take 1 tablet (40 mg total) by mouth daily, Disp: 90 tablet, Rfl: 1  •  guaiFENesin-codeine (Guaiatussin AC) 100-10 mg/5 mL oral solution, Take 5 mL by mouth 3 (three) times a day as needed for cough, Disp: 118 mL, Rfl: 0  •  methylPREDNISolone 4 MG tablet therapy pack, Use as directed on package, Disp: 21 each, Rfl: 0  •  simvastatin (ZOCOR) 40 mg tablet, TAKE 1 TABLET (40 MG TOTAL) BY MOUTH DAILY AT BEDTIME, Disp: 90 tablet, Rfl: 1      Objective:    Vitals:   /68   Pulse (!) 53   Temp 97.7 °F (36.5 °C)   Resp 15   Ht 5' 11\" (1.803 m)   Wt 90.7 kg (200 lb)   SpO2 " 97%   BMI 27.89 kg/m²   Body mass index is 27.89 kg/m².  Vitals:    12/10/24 1214   Weight: 90.7 kg (200 lb)       Physical Exam  Vitals and nursing note reviewed.   Constitutional:       General: He is not in acute distress.     Appearance: He is well-developed. He is not ill-appearing, toxic-appearing or diaphoretic.   HENT:      Head: Normocephalic and atraumatic.      Right Ear: External ear normal.      Left Ear: External ear normal.      Nose: Congestion present.      Mouth/Throat:      Pharynx: No oropharyngeal exudate.   Eyes:      General: Lids are normal. Lids are everted, no foreign bodies appreciated. No scleral icterus.        Right eye: No discharge.         Left eye: No discharge.      Conjunctiva/sclera: Conjunctivae normal.      Pupils: Pupils are equal, round, and reactive to light.   Neck:      Thyroid: No thyromegaly.      Vascular: Normal carotid pulses. No carotid bruit, hepatojugular reflux or JVD.      Trachea: No tracheal tenderness or tracheal deviation.   Cardiovascular:      Rate and Rhythm: Normal rate and regular rhythm.      Pulses: Normal pulses.      Heart sounds: Normal heart sounds. No murmur heard.     No friction rub. No gallop.   Pulmonary:      Effort: Pulmonary effort is normal. No respiratory distress.      Breath sounds: No stridor. Rhonchi present. No wheezing or rales.   Chest:      Chest wall: No tenderness.   Abdominal:      General: Bowel sounds are normal. There is no distension.      Palpations: Abdomen is soft. There is no mass.      Tenderness: There is no abdominal tenderness. There is no guarding or rebound.   Musculoskeletal:         General: No tenderness or deformity. Normal range of motion.      Cervical back: Normal range of motion and neck supple. No edema, erythema or rigidity. No spinous process tenderness or muscular tenderness. Normal range of motion.   Lymphadenopathy:      Head:      Right side of head: No submental, submandibular, tonsillar,  "preauricular or posterior auricular adenopathy.      Left side of head: No submental, submandibular, tonsillar, preauricular, posterior auricular or occipital adenopathy.      Cervical: No cervical adenopathy.      Right cervical: No superficial, deep or posterior cervical adenopathy.     Left cervical: No superficial, deep or posterior cervical adenopathy.      Upper Body:      Right upper body: No pectoral adenopathy.      Left upper body: No pectoral adenopathy.   Skin:     General: Skin is warm and dry.      Coloration: Skin is not pale.      Findings: No erythema or rash.   Neurological:      Mental Status: He is alert and oriented to person, place, and time.      Cranial Nerves: No cranial nerve deficit.      Sensory: No sensory deficit.      Motor: No tremor, abnormal muscle tone or seizure activity.      Coordination: Coordination normal.      Gait: Gait normal.      Deep Tendon Reflexes: Reflexes are normal and symmetric. Reflexes normal.   Psychiatric:         Behavior: Behavior normal.         Thought Content: Thought content normal.         Judgment: Judgment normal.         Lab Review   Office Visit on 12/10/2024   Component Date Value Ref Range Status   • POCT SARS-CoV-2 Ag 12/10/2024 Negative  Negative Final   • VALID CONTROL 12/10/2024 Valid   Final   • RAPID FLU A 12/10/2024 neg   Final   • RAPID FLU B 12/10/2024 neg   Final         Patient Instructions   Patient Education     Acute bronchitis in adults   The Basics   Written by the doctors and editors at [facility]   What is bronchitis? -- This is an infection that causes a cough. It happens when the tubes that carry air into the lungs, called the \"bronchi,\" get infected (figure 1).  Usually, bronchitis happens after a person gets a cold or the flu. The viruses that cause the cold or flu infect the bronchi and irritate them. Antibiotics do not help bronchitis.  Bronchitis can also happen when a person gets an infection called \"whooping cough,\" but " "this is much less common. Whooping cough is caused by bacteria that can infect the bronchi. Most people get vaccines to prevent whooping cough, but the vaccine doesn't always work. Your doctor will be able to tell if you have whooping cough by doing an exam and listening to your cough.  This article is about \"acute\" bronchitis. This is different from \"chronic\" bronchitis, which is a lung disease that most often affects people who smoke.  What are the symptoms of bronchitis? -- The most common symptoms are:   A cough that can last up to a few weeks   Coughing up mucus that is clear, yellow, or green - Green mucus does not always mean that you have a bacterial infection.  You might also have other cold or flu symptoms, like a stuffy nose, sore throat, or headache. People with bronchitis do not usually get a fever.  Will I need tests? -- Most people with bronchitis do not need a test. But if your doctor or nurse is not sure what is causing your cough, they might do tests. For example, they might order a chest X-ray. Or if they think that you might have COVID-19, they will test you for the virus that causes the infection.  How is bronchitis treated? -- Bronchitis almost always goes away on its own. But the cough can take up to 3 weeks to get better, and sometimes even longer.  Doctors do not usually treat bronchitis with antibiotic medicines. That's because bronchitis is usually caused by a virus, and antibiotics kill bacteria, not viruses. Also, antibiotics can actually cause other problems.  To feel better, you can treat your cold and flu symptoms. You can:   Get lots of rest, and drink plenty of liquids.   Drink hot tea.   Suck on cough drops or hard candy.   Take over-the-counter cough and cold medicines.   Breath in warm, moist air, such as in the shower, over a kettle, or from a humidifier.   Take a pain-relieving medicine if you have cold or flu symptoms like headache, muscle aches, or joint pain.  Avoid smoking " "or being around others who smoke. This can make your cough worse.  How can I keep from getting bronchitis again? -- You can reduce your chance of getting bronchitis again by keeping the germs that cause bronchitis out of your body. One of the best ways to do this is to wash your hands often with soap and water. If there is no sink nearby, you can use a hand gel with alcohol in it to clean your hands.  How can I keep from spreading germs? -- In addition to washing your hands often, cover your mouth with your elbow when you sneeze or cough. Using your elbow keeps you from getting germs on your hands. If you use a tissue, throw the tissue away and wash your hands.  When should I call the doctor? -- Call for advice if you have:   A fever higher than 100.4°F (38°C), or chills   Chest pain when you cough, trouble breathing, or coughing up blood   A barking cough that makes it hard to talk   Cough and weight loss that you cannot explain   Symptoms that are not getting better after 3 weeks  All topics are updated as new evidence becomes available and our peer review process is complete.  This topic retrieved from [employer] on: May 16, 2024.  Topic 85006 Version 17.0  Release: 32.4.3 - C32.135  © 2024 UpToDate, Inc. and/or its affiliates. All rights reserved.  figure 1: Normal lungs     The lungs sit in the chest, inside the ribcage. They are covered with a thin membrane called the \"pleura.\" The windpipe, or trachea, branches into 2 smaller airways called the left and right \"bronchi.\" The space between the lungs is called the \"mediastinum.\" Lymph nodes are located within and around the lungs and mediastinum.  Graphic 20807 Version 14.0  Consumer Information Use and Disclaimer   Disclaimer: This generalized information is a limited summary of diagnosis, treatment, and/or medication information. It is not meant to be comprehensive and should be used as a tool to help the user understand and/or assess potential diagnostic and " "treatment options. It does NOT include all information about conditions, treatments, medications, side effects, or risks that may apply to a specific patient. It is not intended to be medical advice or a substitute for the medical advice, diagnosis, or treatment of a health care provider based on the health care provider's examination and assessment of a patient's specific and unique circumstances. Patients must speak with a health care provider for complete information about their health, medical questions, and treatment options, including any risks or benefits regarding use of medications. This information does not endorse any treatments or medications as safe, effective, or approved for treating a specific patient. UpToDate, Inc. and its affiliates disclaim any warranty or liability relating to this information or the use thereof.The use of this information is governed by the Terms of Use, available at https://www.[employer].com/en/know/clinical-effectiveness-terms. 2024© UpToDate, Inc. and its affiliates and/or licensors. All rights reserved.  Copyright   © 2024 UpToDate, Inc. and/or its affiliates. All rights reserved.       Alber Matthews MD        \"This note has been constructed using a voice recognition system.Therefore there may be syntax, spelling, and/or grammatical errors. Please call if you have any questions. \"  "

## 2024-12-10 NOTE — ASSESSMENT & PLAN NOTE
Blood pressure controlled asymptomatic    Agree continue low-salt diet keep monitoring no further intervention needed

## 2024-12-10 NOTE — PATIENT INSTRUCTIONS
"Patient Education     Acute bronchitis in adults   The Basics   Written by the doctors and editors at [facility]   What is bronchitis? -- This is an infection that causes a cough. It happens when the tubes that carry air into the lungs, called the \"bronchi,\" get infected (figure 1).  Usually, bronchitis happens after a person gets a cold or the flu. The viruses that cause the cold or flu infect the bronchi and irritate them. Antibiotics do not help bronchitis.  Bronchitis can also happen when a person gets an infection called \"whooping cough,\" but this is much less common. Whooping cough is caused by bacteria that can infect the bronchi. Most people get vaccines to prevent whooping cough, but the vaccine doesn't always work. Your doctor will be able to tell if you have whooping cough by doing an exam and listening to your cough.  This article is about \"acute\" bronchitis. This is different from \"chronic\" bronchitis, which is a lung disease that most often affects people who smoke.  What are the symptoms of bronchitis? -- The most common symptoms are:   A cough that can last up to a few weeks   Coughing up mucus that is clear, yellow, or green - Green mucus does not always mean that you have a bacterial infection.  You might also have other cold or flu symptoms, like a stuffy nose, sore throat, or headache. People with bronchitis do not usually get a fever.  Will I need tests? -- Most people with bronchitis do not need a test. But if your doctor or nurse is not sure what is causing your cough, they might do tests. For example, they might order a chest X-ray. Or if they think that you might have COVID-19, they will test you for the virus that causes the infection.  How is bronchitis treated? -- Bronchitis almost always goes away on its own. But the cough can take up to 3 weeks to get better, and sometimes even longer.  Doctors do not usually treat bronchitis with antibiotic medicines. That's because bronchitis is usually " caused by a virus, and antibiotics kill bacteria, not viruses. Also, antibiotics can actually cause other problems.  To feel better, you can treat your cold and flu symptoms. You can:   Get lots of rest, and drink plenty of liquids.   Drink hot tea.   Suck on cough drops or hard candy.   Take over-the-counter cough and cold medicines.   Breath in warm, moist air, such as in the shower, over a kettle, or from a humidifier.   Take a pain-relieving medicine if you have cold or flu symptoms like headache, muscle aches, or joint pain.  Avoid smoking or being around others who smoke. This can make your cough worse.  How can I keep from getting bronchitis again? -- You can reduce your chance of getting bronchitis again by keeping the germs that cause bronchitis out of your body. One of the best ways to do this is to wash your hands often with soap and water. If there is no sink nearby, you can use a hand gel with alcohol in it to clean your hands.  How can I keep from spreading germs? -- In addition to washing your hands often, cover your mouth with your elbow when you sneeze or cough. Using your elbow keeps you from getting germs on your hands. If you use a tissue, throw the tissue away and wash your hands.  When should I call the doctor? -- Call for advice if you have:   A fever higher than 100.4°F (38°C), or chills   Chest pain when you cough, trouble breathing, or coughing up blood   A barking cough that makes it hard to talk   Cough and weight loss that you cannot explain   Symptoms that are not getting better after 3 weeks  All topics are updated as new evidence becomes available and our peer review process is complete.  This topic retrieved from [employer] on: May 16, 2024.  Topic 40119 Version 17.0  Release: 32.4.3 - C32.135  © 2024 UpToDate, Inc. and/or its affiliates. All rights reserved.  figure 1: Normal lungs     The lungs sit in the chest, inside the ribcage. They are covered with a thin membrane called the  "\"pleura.\" The windpipe, or trachea, branches into 2 smaller airways called the left and right \"bronchi.\" The space between the lungs is called the \"mediastinum.\" Lymph nodes are located within and around the lungs and mediastinum.  Graphic 32444 Version 14.0  Consumer Information Use and Disclaimer   Disclaimer: This generalized information is a limited summary of diagnosis, treatment, and/or medication information. It is not meant to be comprehensive and should be used as a tool to help the user understand and/or assess potential diagnostic and treatment options. It does NOT include all information about conditions, treatments, medications, side effects, or risks that may apply to a specific patient. It is not intended to be medical advice or a substitute for the medical advice, diagnosis, or treatment of a health care provider based on the health care provider's examination and assessment of a patient's specific and unique circumstances. Patients must speak with a health care provider for complete information about their health, medical questions, and treatment options, including any risks or benefits regarding use of medications. This information does not endorse any treatments or medications as safe, effective, or approved for treating a specific patient. UpToDate, Inc. and its affiliates disclaim any warranty or liability relating to this information or the use thereof.The use of this information is governed by the Terms of Use, available at https://www.wol[employer]urtle Beachuwer.com/en/know/clinical-effectiveness-terms. 2024© UpToDate, Inc. and its affiliates and/or licensors. All rights reserved.  Copyright   © 2024 UpToDate, Inc. and/or its affiliates. All rights reserved.    "

## 2024-12-10 NOTE — ASSESSMENT & PLAN NOTE
Came in symptoms for 2 days started with sore throat nasal congestion with coughing thick yellowish nasal discharge and thick yellowish sputum with general aches pains and headache initially low-grade fever which subsided no chest pain no difficulty breathing no nausea vomiting diarrhea    Tested negative for COVID tested negative for influenza A tested negative for influenza B    Will treat as follows    Zithromax to use as directed    Medrol Dosepak to use as directed    Cough medicine with codeine to use as instructed

## 2025-01-09 PROBLEM — J20.9 ACUTE INFECTIVE TRACHEOBRONCHITIS: Status: RESOLVED | Noted: 2024-12-10 | Resolved: 2025-01-09

## 2025-01-14 ENCOUNTER — HOSPITAL ENCOUNTER (EMERGENCY)
Facility: HOSPITAL | Age: 70
Discharge: HOME | End: 2025-01-14
Attending: EMERGENCY MEDICINE
Payer: COMMERCIAL

## 2025-01-14 ENCOUNTER — APPOINTMENT (EMERGENCY)
Dept: RADIOLOGY | Facility: HOSPITAL | Age: 70
End: 2025-01-14
Payer: COMMERCIAL

## 2025-01-14 VITALS
WEIGHT: 202.8 LBS | HEART RATE: 64 BPM | BODY MASS INDEX: 28.39 KG/M2 | DIASTOLIC BLOOD PRESSURE: 70 MMHG | HEIGHT: 71 IN | OXYGEN SATURATION: 98 % | RESPIRATION RATE: 20 BRPM | TEMPERATURE: 97.4 F | SYSTOLIC BLOOD PRESSURE: 140 MMHG

## 2025-01-14 DIAGNOSIS — R55 SYNCOPE, CONVULSIVE: Primary | ICD-10-CM

## 2025-01-14 LAB
ALBUMIN SERPL BCG-MCNC: 3.8 G/DL (ref 3.5–5)
ALP SERPL-CCNC: 75 U/L (ref 34–104)
ALT SERPL W P-5'-P-CCNC: 15 U/L (ref 7–52)
ANION GAP SERPL CALCULATED.3IONS-SCNC: 10 MMOL/L (ref 4–13)
AST SERPL W P-5'-P-CCNC: 18 U/L (ref 13–39)
ATRIAL RATE: 76 BPM
BASOPHILS # BLD AUTO: 0.04 THOUSANDS/ΜL (ref 0–0.1)
BASOPHILS NFR BLD AUTO: 1 % (ref 0–1)
BILIRUB SERPL-MCNC: 0.42 MG/DL (ref 0.2–1)
BUN SERPL-MCNC: 21 MG/DL (ref 5–25)
CALCIUM SERPL-MCNC: 8 MG/DL (ref 8.4–10.2)
CARDIAC TROPONIN I PNL SERPL HS: <2 NG/L (ref ?–50)
CHLORIDE SERPL-SCNC: 103 MMOL/L (ref 96–108)
CK SERPL-CCNC: 89 U/L (ref 39–308)
CO2 SERPL-SCNC: 26 MMOL/L (ref 21–32)
CREAT SERPL-MCNC: 1.06 MG/DL (ref 0.6–1.3)
EOSINOPHIL # BLD AUTO: 0.16 THOUSAND/ΜL (ref 0–0.61)
EOSINOPHIL NFR BLD AUTO: 2 % (ref 0–6)
ERYTHROCYTE [DISTWIDTH] IN BLOOD BY AUTOMATED COUNT: 12.5 % (ref 11.6–15.1)
GFR SERPL CREATININE-BSD FRML MDRD: 71 ML/MIN/1.73SQ M
GLUCOSE SERPL-MCNC: 94 MG/DL (ref 65–140)
HCT VFR BLD AUTO: 38.4 % (ref 36.5–49.3)
HGB BLD-MCNC: 13 G/DL (ref 12–17)
IMM GRANULOCYTES # BLD AUTO: 0.04 THOUSAND/UL (ref 0–0.2)
IMM GRANULOCYTES NFR BLD AUTO: 1 % (ref 0–2)
LACTATE SERPL-SCNC: 0.6 MMOL/L (ref 0.5–2)
LYMPHOCYTES # BLD AUTO: 1.42 THOUSANDS/ΜL (ref 0.6–4.47)
LYMPHOCYTES NFR BLD AUTO: 18 % (ref 14–44)
MCH RBC QN AUTO: 29.7 PG (ref 26.8–34.3)
MCHC RBC AUTO-ENTMCNC: 33.9 G/DL (ref 31.4–37.4)
MCV RBC AUTO: 88 FL (ref 82–98)
MONOCYTES # BLD AUTO: 0.64 THOUSAND/ΜL (ref 0.17–1.22)
MONOCYTES NFR BLD AUTO: 8 % (ref 4–12)
NEUTROPHILS # BLD AUTO: 5.43 THOUSANDS/ΜL (ref 1.85–7.62)
NEUTS SEG NFR BLD AUTO: 70 % (ref 43–75)
NRBC BLD AUTO-RTO: 0 /100 WBCS
P AXIS: 73 DEGREES
PLATELET # BLD AUTO: 168 THOUSANDS/UL (ref 149–390)
PMV BLD AUTO: 9.4 FL (ref 8.9–12.7)
POTASSIUM SERPL-SCNC: 3.9 MMOL/L (ref 3.5–5.3)
PR INTERVAL: 172 MS
PROT SERPL-MCNC: 6.2 G/DL (ref 6.4–8.4)
QRS AXIS: 86 DEGREES
QRSD INTERVAL: 82 MS
QT INTERVAL: 398 MS
QTC INTERVAL: 447 MS
RBC # BLD AUTO: 4.38 MILLION/UL (ref 3.88–5.62)
SODIUM SERPL-SCNC: 139 MMOL/L (ref 135–147)
T WAVE AXIS: 37 DEGREES
VENTRICULAR RATE: 76 BPM
WBC # BLD AUTO: 7.73 THOUSAND/UL (ref 4.31–10.16)

## 2025-01-14 PROCEDURE — 36415 COLL VENOUS BLD VENIPUNCTURE: CPT | Performed by: EMERGENCY MEDICINE

## 2025-01-14 PROCEDURE — 96360 HYDRATION IV INFUSION INIT: CPT

## 2025-01-14 PROCEDURE — 83605 ASSAY OF LACTIC ACID: CPT | Performed by: EMERGENCY MEDICINE

## 2025-01-14 PROCEDURE — 82550 ASSAY OF CK (CPK): CPT | Performed by: EMERGENCY MEDICINE

## 2025-01-14 PROCEDURE — 93005 ELECTROCARDIOGRAM TRACING: CPT

## 2025-01-14 PROCEDURE — 85025 COMPLETE CBC W/AUTO DIFF WBC: CPT | Performed by: EMERGENCY MEDICINE

## 2025-01-14 PROCEDURE — 70498 CT ANGIOGRAPHY NECK: CPT

## 2025-01-14 PROCEDURE — 99285 EMERGENCY DEPT VISIT HI MDM: CPT

## 2025-01-14 PROCEDURE — 70496 CT ANGIOGRAPHY HEAD: CPT

## 2025-01-14 PROCEDURE — 84484 ASSAY OF TROPONIN QUANT: CPT | Performed by: EMERGENCY MEDICINE

## 2025-01-14 PROCEDURE — 99285 EMERGENCY DEPT VISIT HI MDM: CPT | Performed by: EMERGENCY MEDICINE

## 2025-01-14 PROCEDURE — 96361 HYDRATE IV INFUSION ADD-ON: CPT

## 2025-01-14 PROCEDURE — 80053 COMPREHEN METABOLIC PANEL: CPT | Performed by: EMERGENCY MEDICINE

## 2025-01-14 RX ADMIN — IOHEXOL 85 ML: 350 INJECTION, SOLUTION INTRAVENOUS at 07:41

## 2025-01-14 RX ADMIN — SODIUM CHLORIDE 1000 ML: 0.9 INJECTION, SOLUTION INTRAVENOUS at 06:51

## 2025-01-14 NOTE — ED PROVIDER NOTES
Final Diagnosis:  1. Syncope, convulsive        Chief Complaint   Patient presents with    Syncope     Pt had a syncopal episode 30 minutes ago pta after coming downstairs to get water, was sitting on the edge of bed and started seizing.        HPI  Pt pres w/ syncope vs seizure    Felt hot cold then fell onto bed when getting up. Had shaking. Brisk return to base. No urinary incont no tongue biting. No HA no nv no recent trauma no prior seizures.     Has some pau but hx of bradycardia. Mild.     No chest pain. Felt palp then not now    No sob  No fever/chlls    EMS additionally reports:     - Previous charting underwent limited review with attention to last ED visits, labs, ekgs, and prior imaging.  Chart review reveals :     Office Visit on 12/10/2024   Component Date Value Ref Range Status    POCT SARS-CoV-2 Ag 12/10/2024 Negative  Negative Final    VALID CONTROL 12/10/2024 Valid   Final    RAPID FLU A 12/10/2024 neg   Final    RAPID FLU B 12/10/2024 neg   Final       - No language barrier.   - History obtained from patient    - Discuss patient's care, with patient permission or by chart review, with  wife son including diff seizure vs syncope w/ conv     PMH:   has a past medical history of Cataract, Colon polyp, Dizziness, Ear problems (2020), GERD (gastroesophageal reflux disease), Hyperlipidemia, Lightheadedness, Seasonal allergies, Tinnitus, and Wears glasses.    PSH:   has a past surgical history that includes Tonsillectomy (N/A); pr colonoscopy flx dx w/collj spec when pfrmd (N/A, 2/29/2016); pr egd transoral biopsy single/multiple (N/A, 2/29/2016); and Colonoscopy.     Social History:  Tobacco Use: Medium Risk (1/14/2025)    Patient History     Smoking Tobacco Use: Former     Smokeless Tobacco Use: Never     Passive Exposure: Not on file     Alcohol Use: Not At Risk (1/6/2023)    AUDIT-C     Frequency of Alcohol Consumption: Monthly or less     Average Number of Drinks: 1 or 2     Frequency of Binge  Drinking: Less than monthly     No illicit use       ROS:  Pertinent positives/negatives: .     Some ROS may be present in the HPI and would take precedent over these standard questions asked below.   Review of Systems   Constitutional:  Negative for chills and fever.   Respiratory:  Negative for cough, shortness of breath, wheezing and stridor.    Cardiovascular:  Positive for palpitations. Negative for chest pain and leg swelling.   Gastrointestinal:  Negative for abdominal pain, diarrhea, nausea and vomiting.   Musculoskeletal:  Positive for neck pain. Negative for neck stiffness.   Neurological:  Positive for seizures, syncope and light-headedness. Negative for facial asymmetry and headaches.        CONSTITUTIONAL:  No lethargy. No unexpected weight loss. No change in behavior.  EYES:  No pain, redness, or discharge. No loss of vision. No orbital trauma or pain.   ENT:  No tinnitus or decreased hearing. No epistaxis/purulent rhinorrhea. No voice change, airway closing, trismus.   CARDIOVASCULAR:  No chest pain. No skin mottling or pallor. No change in exertional capacity  RESPIRATORY:  No hemoptysis. No paroxysmal nocturnal dyspnea. No stridor. No apnea or bluing.   GASTROINTESTINAL:  No vomiting, diarrhea. No distension. No melena. No hematochezia.   GENITOURINARY:  No nocturia. No hematuria or foul smelling or cloudy urine. No discharge. No sores/adenopathy  MUSCULOSKELETAL:  No contracture.  No new deformity.   INTEGUMENTARY:  No swelling. No unexpected contusions. No abrasions. No lymphangitis.  NEUROLOGIC:  No meningismus. No new numbness of the extremities. No new focal weakness. No postural instability  PSYCHIATRIC:  No SI HI AVH  HEMATOLOGICAL:  No bleeding. No petechiae. No bruising.  ALLERGIES:  No urticaria. No sudden abd cramping. No stridor.    PE:     Physical exam highlights:   Physical Exam       Vitals:    01/14/25 0617 01/14/25 0700   BP: 126/72 140/70   BP Location: Right arm Right arm  "  Pulse: (!) 51 64   Resp: 18 20   Temp: (!) 97.4 °F (36.3 °C)    TempSrc: Oral    SpO2: 100% 98%   Weight: 92 kg (202 lb 12.8 oz)    Height: 5' 11\" (1.803 m)      Vitals reviewed by me.   Nursing note reviewed  Chaperone present for all sensitive exam.  Const: No acute distress. Alert. Nontoxic. Not diaphoretic.    HEENT: External ears normal. No protrusion drainage swelling. Nose normal. No drainage/traumatic deformity. MM. Mouth with baseline/symmetric movement. No trismus.   Eyes: No squinting. No icterus. No tearing/swelling/drainage. Tracks through the room with normal EOM.   Neck: ROM normal. No rigidity. No meningismus.  Cards: Rate as per vitals Compared to monitor sinus unless documented. Regular Well perfused.  Pulm: Effort and excursion normal. No distress. No audible wheezing/no stridor. Normal resp rate without retraction or change in work of breathing.  Abd: No distension beyond baseline. No fluctuant wave. Patient without peritoneal pain with shifting/bumping the bed.   MSK: ROM normal baseline. No deformity. No contractures from baseline.   Skin: No new rashes visible. Well perfused. No wounds visualized on exposed skin  Neuro: Nonfocal. Baseline. CN grossly intact. Moving all four with coordination.   Psych: Normal behavior and affect.        A:  - Nursing note reviewed.    Ddx and MDM  Considered diagnoses    Pt w/ some neck pain  CTA r/o diss  R/o c spine injury    CT head - in case seizure - r/o mass lesion ICH other anatomic cause  None    R/o ICH  None    Trop  EKG  No arrythmia  No change during cardiac monitor  HEART not relevant  Holter - f/u pcp    R/o electrolyte change  Anemia  Otheer    Lactic ck neg - prob not but could still be seizure.         Dispo decision       My conversation with consultant reveals:        Decision rules:                    ED Course as of 01/14/25 0818   Tue Jan 14, 2025   0637 Prior MRI good, prior echo good, prior CTA poss prior lacunar, no aneurysm   0652 " Pulse(!): 51  Has had low HR before   0710 Procedure Note: EKG  Date/Time: 01/14/25 7:11 AM   Interpreted by: Carter Wells  Indications / Diagnosis: sync  ECG reviewed by me, the ED Provider: yes   The EKG demonstrates:  Rhythm: sinus    Intervals: normal intervals  Axis: normal axis  QRS/Blocks: normal QRS  ST Changes: No acute ST Changes, no STD/JHONNY.  T wave changes: none             My read of the XR/CT scan reveals:     CTA head and neck with and without contrast   Final Result      CT Brain:      1.  No acute intracranial CT abnormality.   2.  Mild paranasal sinus mucosal thickening.      CT Angiography:      1.  Patent major vessels of the [place] without significant stenosis.   2.  No significant stenosis in the cervical carotid or vertebral arteries. Moderate narrowing at the left vertebral artery origin.                  Workstation performed: [id number]             Orders Placed This Encounter   Procedures    CTA head and neck with and without contrast    CBC and differential    Comprehensive metabolic panel    HS Troponin 0hr (reflex protocol)    Lactic acid, plasma (w/reflex if result > 2.0)    CK    HS Troponin I 2hr    HS Troponin I 4hr    Ambulatory Referral to Neurology    Holter monitor    ECG 12 lead    ECG 12 lead     Labs Reviewed   COMPREHENSIVE METABOLIC PANEL - Abnormal       Result Value Ref Range Status    Sodium 139  135 - 147 mmol/L Final    Potassium 3.9  3.5 - 5.3 mmol/L Final    Chloride 103  96 - 108 mmol/L Final    CO2 26  21 - 32 mmol/L Final    ANION GAP 10  4 - 13 mmol/L Final    BUN 21  5 - 25 mg/dL Final    Creatinine 1.06  0.60 - 1.30 mg/dL Final    Comment: Standardized to IDMS reference method    Glucose 94  65 - 140 mg/dL Final    Comment: If the patient is fasting, the ADA then defines impaired fasting glucose as > 100 mg/dL and diabetes as > or equal to 123 mg/dL.    Calcium 8.0 (*) 8.4 - 10.2 mg/dL Final    AST 18  13 - 39 U/L Final    ALT 15  7 - 52 U/L Final  "   Comment: Specimen collection should occur prior to Sulfasalazine administration due to the potential for falsely depressed results.     Alkaline Phosphatase 75  34 - 104 U/L Final    Total Protein 6.2 (*) 6.4 - 8.4 g/dL Final    Albumin 3.8  3.5 - 5.0 g/dL Final    Total Bilirubin 0.42  0.20 - 1.00 mg/dL Final    Comment: Use of this assay is not recommended for patients undergoing treatment with eltrombopag due to the potential for falsely elevated results.  N-acetyl-p-benzoquinone imine (metabolite of Acetaminophen) will generate erroneously low results in samples for patients that have taken an overdose of Acetaminophen.    eGFR 71  ml/min/1.73sq m Final    Narrative:     National Kidney Disease Foundation guidelines for Chronic Kidney Disease (CKD):     Stage 1 with normal or high GFR (GFR > 90 mL/min/1.73 square meters)    Stage 2 Mild CKD (GFR = 60-89 mL/min/1.73 square meters)    Stage 3A Moderate CKD (GFR = 45-59 mL/min/1.73 square meters)    Stage 3B Moderate CKD (GFR = 30-44 mL/min/1.73 square meters)    Stage 4 Severe CKD (GFR = 15-29 mL/min/1.73 square meters)    Stage 5 End Stage CKD (GFR <15 mL/min/1.73 square meters)  Note: GFR calculation is accurate only with a steady state creatinine   HS TROPONIN I 0HR - Normal    hs TnI 0hr <2  \"Refer to ACS Flowchart\"- see link ng/L Final    Comment:                                              Initial (time 0) result  If >=50 ng/L, Myocardial injury suggested ;  Type of myocardial injury and treatment strategy  to be determined.  If 5-49 ng/L, a delta result at 2 hours and or 4 hours will be needed to further evaluate.  If <4 ng/L, and chest pain has been >3 hours since onset, patient may qualify for discharge based on the HEART score in the ED.  If <5 ng/L and <3hours since onset of chest pain, a delta result at 2 hours will be needed to further evaluate.    HS Troponin 99th Percentile URL of a Health Population=12 ng/L with a 95% Confidence Interval of " 8-18 ng/L.    Second Troponin (time 2 hours)  If calculated delta >= 20 ng/L,  Myocardial injury suggested ; Type of myocardial injury and treatment strategy to be determined.  If 5-49 ng/L and the calculated delta is 5-19 ng/L, consult medical service for evaluation.  Continue evaluation for ischemia on ecg and other possible etiology and repeat hs troponin at 4 hours.  If delta is <5 ng/L at 2 hours, consider discharge based on risk stratification via the HEART score (if in ED), or OREN risk score in IP/Observation.    HS Troponin 99th Percentile URL of a Health Population=12 ng/L with a 95% Confidence Interval of 8-18 ng/L.   LACTIC ACID, PLASMA (W/REFLEX IF RESULT > 2.0) - Normal    LACTIC ACID 0.6  0.5 - 2.0 mmol/L Final    Narrative:     Result may be elevated if tourniquet was used during collection.   CK - Normal    Total CK 89  39 - 308 U/L Final   CBC AND DIFFERENTIAL    WBC 7.73  4.31 - 10.16 Thousand/uL Final    RBC 4.38  3.88 - 5.62 Million/uL Final    Hemoglobin 13.0  12.0 - 17.0 g/dL Final    Hematocrit 38.4  36.5 - 49.3 % Final    MCV 88  82 - 98 fL Final    MCH 29.7  26.8 - 34.3 pg Final    MCHC 33.9  31.4 - 37.4 g/dL Final    RDW 12.5  11.6 - 15.1 % Final    MPV 9.4  8.9 - 12.7 fL Final    Platelets 168  149 - 390 Thousands/uL Final    nRBC 0  /100 WBCs Final    Segmented % 70  43 - 75 % Final    Immature Grans % 1  0 - 2 % Final    Lymphocytes % 18  14 - 44 % Final    Monocytes % 8  4 - 12 % Final    Eosinophils Relative 2  0 - 6 % Final    Basophils Relative 1  0 - 1 % Final    Absolute Neutrophils 5.43  1.85 - 7.62 Thousands/µL Final    Absolute Immature Grans 0.04  0.00 - 0.20 Thousand/uL Final    Absolute Lymphocytes 1.42  0.60 - 4.47 Thousands/µL Final    Absolute Monocytes 0.64  0.17 - 1.22 Thousand/µL Final    Eosinophils Absolute 0.16  0.00 - 0.61 Thousand/µL Final    Basophils Absolute 0.04  0.00 - 0.10 Thousands/µL Final   HS TROPONIN I 2HR   HS TROPONIN I 4HR       *Each of these labs  was reviewed. Particular standout labs will be noted in the ED Course above     Final Diagnosis:  1. Syncope, convulsive          P:  - hospital tx includes   Medications   sodium chloride 0.9 % bolus 1,000 mL (1,000 mL Intravenous New Bag 1/14/25 0651)   iohexol (OMNIPAQUE) 350 MG/ML injection (MULTI-DOSE) 85 mL (85 mL Intravenous Given 1/14/25 0741)         - disposition  Time reflects when diagnosis was documented in both MDM as applicable and the Disposition within this note       Time User Action Codes Description Comment    1/14/2025  8:15 AM Carter Wells Add [R55] Syncope, convulsive           ED Disposition       ED Disposition   Discharge    Condition   Stable    Date/Time   Tue Jan 14, 2025  8:15 AM    Comment   Shay Aguilar discharge to home/self care.                   Follow-up Information       Follow up With Specialties Details Why Contact Info Additional Information    Alber Matthews MD Internal Medicine   [address] [address]  [phone]       [facility] Neurology   [address] [facility] [address]  [phone] [address], [address]   [phone]            - patient will call their PCP to let them know they were in the emergency department. We discuss return precautions and patient is agreeable with plan and aformentioned disposition.       - additional treatment intended, if consistent with primary provider:  - patient to follow with :      Patient's Medications   Discharge Prescriptions    No medications on file     Outpatient Discharge Orders   Ambulatory Referral to Neurology   Standing Status: Future Standing Exp. Date: 01/14/26      Holter monitor   Standing Status: Future Standing Exp. Date: 01/14/29     Prior to Admission Medications   Prescriptions Last Dose Informant Patient Reported? Taking?   Cyanocobalamin (VITAMIN B 12 PO)  Self  "Yes No   Sig: Take by mouth in the morning   dexamethasone (DECADRON) 4 mg tablet  Self Yes No   Sig: TAKE 1 TABLET BY MOUTH STAT AND 1 TABLET BY MOUTH 12 HOURS LATER   ergocalciferol (VITAMIN D2) 50,000 units  Self No No   Sig: Take 1 capsule (50,000 Units total) by mouth once a week   famotidine (PEPCID) 40 MG tablet  Self No No   Sig: Take 1 tablet (40 mg total) by mouth daily   guaiFENesin-codeine (Guaiatussin AC) 100-10 mg/5 mL oral solution   No No   Sig: Take 5 mL by mouth 3 (three) times a day as needed for cough   methylPREDNISolone 4 MG tablet therapy pack   No No   Sig: Use as directed on package   simvastatin (ZOCOR) 40 mg tablet  Self No No   Sig: TAKE 1 TABLET (40 MG TOTAL) BY MOUTH DAILY AT BEDTIME      Facility-Administered Medications: None       Portions of the record may have been created with voice recognition software. Occasional wrong word or \"sound a like\" substitutions may have occurred due to the inherent limitations of voice recognition software. Read the chart carefully and recognize, using context, where substitutions have occurred.    Electronically signed by:  MD Carter Blas MD  01/14/25 0831    "

## 2025-01-14 NOTE — DISCHARGE INSTRUCTIONS
If you would like to follow w/ neurology regarding possible seizure, there's a consult ordered for you    Otherwise read up about syncope in this packet  Come back w/ recurrence    Otherwise call central scheduling and get your holter monitor to look at your heart rhythm for 2 days and go see your PCP

## 2025-01-15 ENCOUNTER — VBI (OUTPATIENT)
Dept: INTERNAL MEDICINE CLINIC | Facility: CLINIC | Age: 70
End: 2025-01-15

## 2025-01-15 ENCOUNTER — HOSPITAL ENCOUNTER (OUTPATIENT)
Dept: NON INVASIVE DIAGNOSTICS | Facility: HOSPITAL | Age: 70
Discharge: HOME | End: 2025-01-15
Attending: EMERGENCY MEDICINE
Payer: COMMERCIAL

## 2025-01-15 DIAGNOSIS — R55 SYNCOPE, CONVULSIVE: ICD-10-CM

## 2025-01-15 PROCEDURE — 93225 XTRNL ECG REC<48 HRS REC: CPT

## 2025-01-15 PROCEDURE — 93226 XTRNL ECG REC<48 HR SCAN A/R: CPT

## 2025-01-15 NOTE — TELEPHONE ENCOUNTER
01/15/25 10:31 AM    Patient contacted post ED visit, [facility] department spoke with patient/caregiver and outreach was successful.    Thank you.  Dianna Stokes  [facility]

## 2025-01-21 ENCOUNTER — OFFICE VISIT (OUTPATIENT)
Dept: INTERNAL MEDICINE CLINIC | Facility: CLINIC | Age: 70
End: 2025-01-21
Payer: COMMERCIAL

## 2025-01-21 VITALS
BODY MASS INDEX: 28 KG/M2 | WEIGHT: 200 LBS | HEART RATE: 62 BPM | DIASTOLIC BLOOD PRESSURE: 78 MMHG | SYSTOLIC BLOOD PRESSURE: 110 MMHG | OXYGEN SATURATION: 97 % | HEIGHT: 71 IN

## 2025-01-21 DIAGNOSIS — N18.31 STAGE 3A CHRONIC KIDNEY DISEASE (HCC): ICD-10-CM

## 2025-01-21 DIAGNOSIS — K21.9 GASTROESOPHAGEAL REFLUX DISEASE WITHOUT ESOPHAGITIS: ICD-10-CM

## 2025-01-21 DIAGNOSIS — R55 SYNCOPE AND COLLAPSE: Primary | ICD-10-CM

## 2025-01-21 PROCEDURE — 99213 OFFICE O/P EST LOW 20 MIN: CPT | Performed by: INTERNAL MEDICINE

## 2025-01-21 PROCEDURE — G2211 COMPLEX E/M VISIT ADD ON: HCPCS | Performed by: INTERNAL MEDICINE

## 2025-01-21 NOTE — ASSESSMENT & PLAN NOTE
Lab Results   Component Value Date    EGFR 71 01/14/2025    EGFR 67 08/05/2024    EGFR 73 02/07/2024    CREATININE 1.06 01/14/2025    CREATININE 1.12 08/05/2024    CREATININE 1.11 02/07/2024   Stage IIIa chronic kidney disease resolved no further intervention needed avoid nephrotoxic drugs BMP reviewed will monitor closely

## 2025-01-21 NOTE — PROGRESS NOTES
Name: Shay Aguilar      : 1955      MRN: [id number]  Encounter Provider: Alber Matthews MD  Encounter Date: 2025   Encounter department: [facility]  :  Assessment & Plan  Stage 3a chronic kidney disease (HCC)  Lab Results   Component Value Date    EGFR 71 2025    EGFR 67 2024    EGFR 73 2024    CREATININE 1.06 2025    CREATININE 1.12 2024    CREATININE 1.11 2024   Stage IIIa chronic kidney disease resolved no further intervention needed avoid nephrotoxic drugs BMP reviewed will monitor closely       Syncope and collapse  Seen in the ER to complete workup done including CT after CTA of the head and neck unremarkable seen by cardiology Holter monitoring done for now no etiology clear  Awaiting to be seen by cardiology for now asymptomatic will monitor closely  PossiblyOrthostatic hypotension patient claims slept late got up early and was not drinking enough the day before  Emergency room records reviewed    Will monitor closely         Gastroesophageal reflux disease without esophagitis  Complaining of intermittent heartburn history of GERD  Antireflux measures discussed and strict diet for now patient asymptomatic  Will start Pepcid 40 mg daily    Instructed about the diet to avoid chocolate caffeine alcohol nicotine and steroid              History of Present Illness   Came in for checkup post ER visit emergency room record reviewed for now patient denies any dizziness Holter monitoring done results pending no palpitations no chest pain no difficulty breathing no dizziness no neurological symptoms      Review of Systems   Constitutional:  Positive for activity change. Negative for appetite change, chills, diaphoresis, fatigue, fever and unexpected weight change.   HENT:  Negative for congestion, dental problem, drooling, ear discharge, facial swelling, hearing loss, mouth sores, nosebleeds, postnasal drip, rhinorrhea, sinus pressure,  "sneezing, sore throat, tinnitus, trouble swallowing and voice change.    Eyes:  Negative for photophobia, pain, discharge, redness, itching and visual disturbance.   Respiratory:  Negative for apnea, cough, choking, chest tightness, shortness of breath, wheezing and stridor.    Cardiovascular:  Negative for chest pain, palpitations and leg swelling.   Gastrointestinal:  Negative for abdominal distention, abdominal pain, anal bleeding, blood in stool, constipation, diarrhea, nausea, rectal pain and vomiting.   Endocrine: Negative for cold intolerance, heat intolerance, polydipsia, polyphagia and polyuria.   Genitourinary:  Negative for decreased urine volume, difficulty urinating, dysuria, enuresis, flank pain, frequency, genital sores, hematuria and urgency.   Musculoskeletal:  Positive for back pain. Negative for arthralgias, gait problem, joint swelling, myalgias, neck pain and neck stiffness.   Skin:  Negative for color change, pallor, rash and wound.   Allergic/Immunologic: Negative.  Negative for environmental allergies, food allergies and immunocompromised state.   Neurological:  Negative for dizziness, tremors, seizures, syncope, facial asymmetry, speech difficulty, weakness, light-headedness, numbness and headaches.   Psychiatric/Behavioral:  Negative for agitation, behavioral problems, confusion, decreased concentration, dysphoric mood, hallucinations, self-injury, sleep disturbance and suicidal ideas. The patient is not nervous/anxious and is not hyperactive.        Objective   /78   Pulse 62   Ht 5' 11\" (1.803 m)   Wt 90.7 kg (200 lb)   SpO2 97%   BMI 27.89 kg/m²      Physical Exam  Vitals and nursing note reviewed.   Constitutional:       General: He is not in acute distress.     Appearance: He is well-developed. He is not ill-appearing, toxic-appearing or diaphoretic.   HENT:      Head: Normocephalic and atraumatic.      Right Ear: External ear normal.      Left Ear: External ear normal.      " Nose: Nose normal.      Mouth/Throat:      Pharynx: No oropharyngeal exudate.   Eyes:      General: Lids are normal. Lids are everted, no foreign bodies appreciated. No scleral icterus.        Right eye: No discharge.         Left eye: No discharge.      Conjunctiva/sclera: Conjunctivae normal.      Pupils: Pupils are equal, round, and reactive to light.   Neck:      Thyroid: No thyromegaly.      Vascular: Normal carotid pulses. No carotid bruit, hepatojugular reflux or JVD.      Trachea: No tracheal tenderness or tracheal deviation.   Cardiovascular:      Rate and Rhythm: Normal rate and regular rhythm.      Pulses: Normal pulses.      Heart sounds: Normal heart sounds. No murmur heard.     No friction rub. No gallop.   Pulmonary:      Effort: Pulmonary effort is normal. No respiratory distress.      Breath sounds: Normal breath sounds. No stridor. No wheezing or rales.   Chest:      Chest wall: No tenderness.   Abdominal:      General: Bowel sounds are normal. There is no distension.      Palpations: Abdomen is soft. There is no mass.      Tenderness: There is no abdominal tenderness. There is no guarding or rebound.   Musculoskeletal:         General: No tenderness or deformity. Normal range of motion.      Cervical back: Normal range of motion and neck supple. No edema, erythema or rigidity. No spinous process tenderness or muscular tenderness. Normal range of motion.   Lymphadenopathy:      Head:      Right side of head: No submental, submandibular, tonsillar, preauricular or posterior auricular adenopathy.      Left side of head: No submental, submandibular, tonsillar, preauricular, posterior auricular or occipital adenopathy.      Cervical: No cervical adenopathy.      Right cervical: No superficial, deep or posterior cervical adenopathy.     Left cervical: No superficial, deep or posterior cervical adenopathy.      Upper Body:      Right upper body: No pectoral adenopathy.      Left upper body: No pectoral  adenopathy.   Skin:     General: Skin is warm and dry.      Coloration: Skin is not pale.      Findings: No erythema or rash.   Neurological:      General: No focal deficit present.      Mental Status: He is alert and oriented to person, place, and time.      Cranial Nerves: No cranial nerve deficit.      Sensory: No sensory deficit.      Motor: No tremor, abnormal muscle tone or seizure activity.      Coordination: Coordination normal.      Gait: Gait normal.      Deep Tendon Reflexes: Reflexes are normal and symmetric. Reflexes normal.   Psychiatric:         Behavior: Behavior normal.         Thought Content: Thought content normal.         Judgment: Judgment normal.

## 2025-01-21 NOTE — ASSESSMENT & PLAN NOTE
Seen in the ER to complete workup done including CT after CTA of the head and neck unremarkable seen by cardiology Holter monitoring done for now no etiology clear  Awaiting to be seen by cardiology for now asymptomatic will monitor closely  PossiblyOrthostatic hypotension patient claims slept late got up early and was not drinking enough the day before  Emergency room records reviewed    Will monitor closely

## 2025-01-21 NOTE — ASSESSMENT & PLAN NOTE
Complaining of intermittent heartburn history of GERD  Antireflux measures discussed and strict diet for now patient asymptomatic  Will start Pepcid 40 mg daily    Instructed about the diet to avoid chocolate caffeine alcohol nicotine and steroid

## 2025-02-03 ENCOUNTER — OFFICE VISIT (OUTPATIENT)
Dept: INTERNAL MEDICINE CLINIC | Facility: CLINIC | Age: 70
End: 2025-02-03
Payer: COMMERCIAL

## 2025-02-03 VITALS
TEMPERATURE: 98 F | OXYGEN SATURATION: 97 % | WEIGHT: 198 LBS | DIASTOLIC BLOOD PRESSURE: 78 MMHG | HEART RATE: 83 BPM | SYSTOLIC BLOOD PRESSURE: 118 MMHG | BODY MASS INDEX: 27.72 KG/M2 | RESPIRATION RATE: 16 BRPM | HEIGHT: 71 IN

## 2025-02-03 DIAGNOSIS — J20.9 ACUTE INFECTIVE TRACHEOBRONCHITIS: Primary | ICD-10-CM

## 2025-02-03 DIAGNOSIS — E55.9 VITAMIN D DEFICIENCY: ICD-10-CM

## 2025-02-03 DIAGNOSIS — R05.1 ACUTE COUGH: ICD-10-CM

## 2025-02-03 LAB
SARS-COV-2 AG UPPER RESP QL IA: NEGATIVE
VALID CONTROL: NORMAL

## 2025-02-03 PROCEDURE — 99213 OFFICE O/P EST LOW 20 MIN: CPT | Performed by: INTERNAL MEDICINE

## 2025-02-03 PROCEDURE — 87811 SARS-COV-2 COVID19 W/OPTIC: CPT | Performed by: INTERNAL MEDICINE

## 2025-02-03 PROCEDURE — G2211 COMPLEX E/M VISIT ADD ON: HCPCS | Performed by: INTERNAL MEDICINE

## 2025-02-03 RX ORDER — METHYLPREDNISOLONE 4 MG/1
TABLET ORAL
Qty: 21 EACH | Refills: 0 | Status: SHIPPED | OUTPATIENT
Start: 2025-02-03

## 2025-02-03 RX ORDER — AZITHROMYCIN 250 MG/1
TABLET, FILM COATED ORAL
Qty: 6 TABLET | Refills: 0 | Status: SHIPPED | OUTPATIENT
Start: 2025-02-03 | End: 2025-02-08

## 2025-02-03 RX ORDER — ERGOCALCIFEROL 1.25 MG/1
50000 CAPSULE, LIQUID FILLED ORAL WEEKLY
Qty: 12 CAPSULE | Refills: 1 | Status: SHIPPED | OUTPATIENT
Start: 2025-02-03

## 2025-02-03 NOTE — PATIENT INSTRUCTIONS
"Patient Education     Acute bronchitis in adults   The Basics   Written by the doctors and editors at [facility]   What is bronchitis? -- This is an infection that causes a cough. It happens when the tubes that carry air into the lungs, called the \"bronchi,\" get infected (figure 1).  Usually, bronchitis happens after a person gets a cold or the flu. The viruses that cause the cold or flu infect the bronchi and irritate them. Antibiotics do not help bronchitis.  Bronchitis can also happen when a person gets an infection called \"whooping cough,\" but this is much less common. Whooping cough is caused by bacteria that can infect the bronchi. Most people get vaccines to prevent whooping cough, but the vaccine doesn't always work. Your doctor will be able to tell if you have whooping cough by doing an exam and listening to your cough.  This article is about \"acute\" bronchitis. This is different from \"chronic\" bronchitis, which is a lung disease that most often affects people who smoke.  What are the symptoms of bronchitis? -- The most common symptoms are:   A cough that can last up to a few weeks   Coughing up mucus that is clear, yellow, or green - Green mucus does not always mean that you have a bacterial infection.  You might also have other cold or flu symptoms, like a stuffy nose, sore throat, or headache. People with bronchitis do not usually get a fever.  Will I need tests? -- Most people with bronchitis do not need a test. But if your doctor or nurse is not sure what is causing your cough, they might do tests. For example, they might order a chest X-ray. Or if they think that you might have COVID-19, they will test you for the virus that causes the infection.  How is bronchitis treated? -- Bronchitis almost always goes away on its own. But the cough can take up to 3 weeks to get better, and sometimes even longer.  Doctors do not usually treat bronchitis with antibiotic medicines. That's because bronchitis is usually " caused by a virus, and antibiotics kill bacteria, not viruses. Also, antibiotics can actually cause other problems.  To feel better, you can treat your cold and flu symptoms. You can:   Get lots of rest, and drink plenty of liquids.   Drink hot tea.   Suck on cough drops or hard candy.   Take over-the-counter cough and cold medicines.   Breath in warm, moist air, such as in the shower, over a kettle, or from a humidifier.   Take a pain-relieving medicine if you have cold or flu symptoms like headache, muscle aches, or joint pain.  Avoid smoking or being around others who smoke. This can make your cough worse.  How can I keep from getting bronchitis again? -- You can reduce your chance of getting bronchitis again by keeping the germs that cause bronchitis out of your body. One of the best ways to do this is to wash your hands often with soap and water. If there is no sink nearby, you can use a hand gel with alcohol in it to clean your hands.  How can I keep from spreading germs? -- In addition to washing your hands often, cover your mouth with your elbow when you sneeze or cough. Using your elbow keeps you from getting germs on your hands. If you use a tissue, throw the tissue away and wash your hands.  When should I call the doctor? -- Call for advice if you have:   A fever higher than 100.4°F (38°C), or chills   Chest pain when you cough, trouble breathing, or coughing up blood   A barking cough that makes it hard to talk   Cough and weight loss that you cannot explain   Symptoms that are not getting better after 3 weeks  All topics are updated as new evidence becomes available and our peer review process is complete.  This topic retrieved from [employer] on: May 16, 2024.  Topic 19988 Version 17.0  Release: 32.4.3 - C32.135  © 2024 UpToDate, Inc. and/or its affiliates. All rights reserved.  figure 1: Normal lungs     The lungs sit in the chest, inside the ribcage. They are covered with a thin membrane called the  "\"pleura.\" The windpipe, or trachea, branches into 2 smaller airways called the left and right \"bronchi.\" The space between the lungs is called the \"mediastinum.\" Lymph nodes are located within and around the lungs and mediastinum.  Graphic 81618 Version 14.0  Consumer Information Use and Disclaimer   Disclaimer: This generalized information is a limited summary of diagnosis, treatment, and/or medication information. It is not meant to be comprehensive and should be used as a tool to help the user understand and/or assess potential diagnostic and treatment options. It does NOT include all information about conditions, treatments, medications, side effects, or risks that may apply to a specific patient. It is not intended to be medical advice or a substitute for the medical advice, diagnosis, or treatment of a health care provider based on the health care provider's examination and assessment of a patient's specific and unique circumstances. Patients must speak with a health care provider for complete information about their health, medical questions, and treatment options, including any risks or benefits regarding use of medications. This information does not endorse any treatments or medications as safe, effective, or approved for treating a specific patient. UpToDate, Inc. and its affiliates disclaim any warranty or liability relating to this information or the use thereof.The use of this information is governed by the Terms of Use, available at https://www.wolNexopiauwer.com/en/know/clinical-effectiveness-terms. 2024© UpToDate, Inc. and its affiliates and/or licensors. All rights reserved.  Copyright   © 2024 UpToDate, Inc. and/or its affiliates. All rights reserved.    "

## 2025-02-03 NOTE — PROGRESS NOTES
Name: Shay Aguilar      : 1955      MRN: [id number]  Encounter Provider: Alber Matthews MD  Encounter Date: 2/3/2025   Encounter department: [facility]  :  Assessment & Plan  Vitamin D deficiency    Orders:  •  ergocalciferol (VITAMIN D2) 50,000 units; Take 1 capsule (50,000 Units total) by mouth once a week    Acute cough  Due to acute infective tracheobronchitis tested negative for COVID  Orders:  •  POCT Rapid Covid Ag    Acute infective tracheobronchitis  Came in for 8 days started with generalized aches pains headache nasal congestion runny nose scratchy throat the symptoms with the throat resolved persistent some nasal congestion now coughing scanty yellow sputum no fever chills no difficulty breathing no chest pain no high fever no nausea vomiting    Tested negative for COVID    Will treat with Zithromax to use as directed    Will treat with Medrol Dosepak to use as directed    Robitussin DM 2 teaspoonful 4 times a day as needed  Orders:  •  azithromycin (Zithromax) 250 mg tablet; Take 2 tablets (500 mg total) by mouth daily for 1 day, THEN 1 tablet (250 mg total) daily for 4 days.  •  methylPREDNISolone 4 MG tablet therapy pack; Use as directed on package           History of Present Illness   Came in for 8 days started with generalized aches pains headache nasal congestion runny nose scratchy throat the symptoms with the throat resolved persistent some nasal congestion now coughing scanty yellow sputum no fever chills no difficulty breathing no chest pain no high fever no nausea vomiting      Review of Systems   Constitutional:  Positive for activity change. Negative for appetite change, chills, diaphoresis, fatigue, fever and unexpected weight change.   HENT:  Positive for congestion. Negative for dental problem, drooling, ear discharge, facial swelling, hearing loss, mouth sores, nosebleeds, postnasal drip, rhinorrhea, sinus pressure, sneezing, sore throat,  "tinnitus, trouble swallowing and voice change.    Eyes:  Negative for photophobia, pain, discharge, redness, itching and visual disturbance.   Respiratory:  Positive for cough. Negative for apnea, choking, chest tightness, shortness of breath, wheezing and stridor.    Cardiovascular:  Negative for chest pain, palpitations and leg swelling.   Gastrointestinal:  Negative for abdominal distention, abdominal pain, anal bleeding, blood in stool, constipation, diarrhea, nausea, rectal pain and vomiting.   Endocrine: Negative for cold intolerance, heat intolerance, polydipsia, polyphagia and polyuria.   Genitourinary:  Negative for decreased urine volume, difficulty urinating, dysuria, enuresis, flank pain, frequency, genital sores, hematuria and urgency.   Musculoskeletal:  Positive for myalgias. Negative for arthralgias, back pain, gait problem, joint swelling, neck pain and neck stiffness.   Skin:  Negative for color change, pallor, rash and wound.   Allergic/Immunologic: Negative.  Negative for environmental allergies, food allergies and immunocompromised state.   Neurological:  Negative for dizziness, tremors, seizures, syncope, facial asymmetry, speech difficulty, weakness, light-headedness, numbness and headaches.   Psychiatric/Behavioral:  Negative for agitation, behavioral problems, confusion, decreased concentration, dysphoric mood, hallucinations, self-injury, sleep disturbance and suicidal ideas. The patient is not nervous/anxious and is not hyperactive.        Objective   /78   Pulse 83   Temp 98 °F (36.7 °C)   Resp 16   Ht 5' 11\" (1.803 m)   Wt 89.8 kg (198 lb)   SpO2 97%   BMI 27.62 kg/m²      Physical Exam  Vitals and nursing note reviewed.   Constitutional:       General: He is not in acute distress.     Appearance: He is well-developed. He is not ill-appearing, toxic-appearing or diaphoretic.   HENT:      Head: Normocephalic and atraumatic.      Right Ear: External ear normal.      Left Ear: " External ear normal.      Nose: Congestion present.      Mouth/Throat:      Pharynx: No oropharyngeal exudate.   Eyes:      General: Lids are normal. Lids are everted, no foreign bodies appreciated. No scleral icterus.        Right eye: No discharge.         Left eye: No discharge.      Conjunctiva/sclera: Conjunctivae normal.      Pupils: Pupils are equal, round, and reactive to light.   Neck:      Thyroid: No thyromegaly.      Vascular: Normal carotid pulses. No carotid bruit, hepatojugular reflux or JVD.      Trachea: No tracheal tenderness or tracheal deviation.   Cardiovascular:      Rate and Rhythm: Normal rate and regular rhythm.      Pulses: Normal pulses.      Heart sounds: Normal heart sounds. No murmur heard.     No friction rub. No gallop.   Pulmonary:      Effort: Pulmonary effort is normal. No respiratory distress.      Breath sounds: No stridor. Rhonchi present. No wheezing or rales.   Chest:      Chest wall: No tenderness.   Abdominal:      General: Bowel sounds are normal. There is no distension.      Palpations: Abdomen is soft. There is no mass.      Tenderness: There is no abdominal tenderness. There is no guarding or rebound.   Musculoskeletal:         General: No tenderness or deformity. Normal range of motion.      Cervical back: Normal range of motion and neck supple. No edema, erythema or rigidity. No spinous process tenderness or muscular tenderness. Normal range of motion.   Lymphadenopathy:      Head:      Right side of head: No submental, submandibular, tonsillar, preauricular or posterior auricular adenopathy.      Left side of head: No submental, submandibular, tonsillar, preauricular, posterior auricular or occipital adenopathy.      Cervical: No cervical adenopathy.      Right cervical: No superficial, deep or posterior cervical adenopathy.     Left cervical: No superficial, deep or posterior cervical adenopathy.      Upper Body:      Right upper body: No pectoral adenopathy.       Left upper body: No pectoral adenopathy.   Skin:     General: Skin is warm and dry.      Coloration: Skin is not pale.      Findings: No erythema or rash.   Neurological:      General: No focal deficit present.      Mental Status: He is alert and oriented to person, place, and time.      Cranial Nerves: No cranial nerve deficit.      Sensory: No sensory deficit.      Motor: No tremor, abnormal muscle tone or seizure activity.      Coordination: Coordination normal.      Gait: Gait normal.      Deep Tendon Reflexes: Reflexes are normal and symmetric. Reflexes normal.   Psychiatric:         Behavior: Behavior normal.         Thought Content: Thought content normal.         Judgment: Judgment normal.

## 2025-02-04 DIAGNOSIS — J20.9 ACUTE INFECTIVE TRACHEOBRONCHITIS: Primary | ICD-10-CM

## 2025-02-04 RX ORDER — CODEINE PHOSPHATE AND GUAIFENESIN 10; 100 MG/5ML; MG/5ML
5 SOLUTION ORAL 3 TIMES DAILY PRN
Qty: 118 ML | Refills: 0 | Status: SHIPPED | OUTPATIENT
Start: 2025-02-04

## 2025-02-04 NOTE — TELEPHONE ENCOUNTER
Patient called the RX Refill Line. Message is being forwarded to the office.     Patient is requesting to have a script for the cough medicine with codeine that was discussed during his appointment. Please send script to [facility].    Please contact patient at [phone]

## 2025-03-03 DIAGNOSIS — E78.2 MIXED HYPERLIPIDEMIA: ICD-10-CM

## 2025-03-04 RX ORDER — SIMVASTATIN 40 MG
40 TABLET ORAL
Qty: 90 TABLET | Refills: 1 | Status: SHIPPED | OUTPATIENT
Start: 2025-03-04

## 2025-03-10 NOTE — PROGRESS NOTES
Consultation - Cardiology Office  [facility].    Shay Aguilar 70 y.o. male MRN: [id number]  : 1955  Unit/Bed#:  Encounter: [id number]        Assessment & Plan  Loss of consciousness (HCC)  Syncope VS seizure disorder  Syncope, unspecified syncope type  As per ED notes patient had a syncopal episode 30 minutes prior to admission after coming downstairs to get water, was sitting on the edge of the bed and started seizing    Patient has low normal blood pressure and states that sometimes he gets dehydrated.  Therefore it could have been an orthostatic event.  He is not orthostatic in our office today    48-hour Holter monitor 1/15/2025  Overall normal 48-hour Holter monitor.    The average heart rate was 71 bpm.  The slowest heart rate was 34 bpm at 1:43 AM.  The maximum heart rate was 138 bpm at 7:22 AM.    Total ventricular ectopic beats were 17 or 0% of total heartbeats.    Total supraventricular ectopic beats were 3648 or 1.8% of total heartbeats.  2353 were single PACs.  The longest R-R interval was 2.3 seconds at 3:19 AM.    No major sustained supraventricular arrhythmias were noted.  No major ventricular arrhythmias.  No major symptoms reported on journal entry     TTE, 2020:  EF 50-55%, trace TR, mild SD  Palpitation       ASSESSMENT:  Loss of consciousness (HCC)  Syncope VS seizure disorder    Syncope, unspecified syncope type  As per ED notes patient had a syncopal episode 30 minutes prior to admission after coming downstairs to get water, was sitting on the edge of the bed and started seizing    Patient has low normal blood pressure and states that sometimes he gets dehydrated.  Therefore it could have been an orthostatic event.  He is not orthostatic in our office today    48-hour Holter monitor 1/15/2025  Overall normal 48-hour Holter monitor.    The average heart rate was 71 bpm.  The slowest heart rate was 34 bpm at 1:43 AM.  The maximum heart rate was 138 bpm at 7:22 AM.     Total ventricular ectopic beats were 17 or 0% of total heartbeats.    Total supraventricular ectopic beats were 3648 or 1.8% of total heartbeats.  2353 were single PACs.  The longest R-R interval was 2.3 seconds at 3:19 AM.    No major sustained supraventricular arrhythmias were noted.  No major ventricular arrhythmias.  No major symptoms reported on journal entry     TTE, 11/2/2020:  EF 50-55%, trace TR, mild DC  Palpitation      RECOMMENDATIONS:  Echocardiogram  Exercise stress test  Neurology evaluation  Strongly emphasized to stay well-hydrated and be cautious when standing up from sitting or lying position before ambulating            Thank you for your consultation.  If you have any question please call me at [phone]      Primary Care Physician Requesting Consult: Alber Matthews MD      Reason for Consult / Principal Problem: Syncope vs seizures        HPI :     Shay Aguilar is a 70 y.o. year old male who was referred by primary care doctor for evaluation of 2 episodes of loss of consciousness about an year apart.  According to the patient he had gotten up to get some water and fell on his bed and started seizing and woke up about a minute or so.  He has an low normal blood pressure and possibly could have been dehydrated and orthostatic at that time.  He is not orthostatic in our office today.  His 30-day ambulatory monitor did not reveal any cardiac arrhythmia.  We will rule out ischemia and structural heart disease with further cardiac testing.  However he is also advised to follow-up with neurology and to avoid dehydration      Review of Systems   Neurological:  Positive for seizures and syncope.   All other systems reviewed and are negative.      Historical Information   Past Medical History:   Diagnosis Date    Cataract     Colon polyp     Dizziness     Ear problems 2020    GERD (gastroesophageal reflux disease)     Hyperlipidemia     Lightheadedness     Seasonal allergies     Tinnitus      Wears glasses      Past Surgical History:   Procedure Laterality Date    COLONOSCOPY      CA COLONOSCOPY FLX DX W/COLLJ SPEC WHEN PFRMD N/A 2016    Procedure: COLONOSCOPY;  Surgeon: Gorge Deleon MD;  Location: [facility];  Service: Gastroenterology    CA EGD TRANSORAL BIOPSY SINGLE/MULTIPLE N/A 2016    Procedure: ESOPHAGOGASTRODUODENOSCOPY (EGD);  Surgeon: Gorge Deleon MD;  Location: [facility];  Service: Gastroenterology    TONSILLECTOMY N/A     child     Social History     Substance and Sexual Activity   Alcohol Use Yes    Comment: on occ     Social History     Substance and Sexual Activity   Drug Use No     Social History     Tobacco Use   Smoking Status Former    Current packs/day: 0.00    Average packs/day: 0.5 packs/day for 8.2 years (4.1 ttl pk-yrs)    Types: Cigarettes    Start date: 1975    Quit date: 1983    Years since quittin.0   Smokeless Tobacco Never     Family History:   Family History   Problem Relation Age of Onset    Arthritis Mother     Alzheimer's disease Mother     Other Mother         smoker-phlegm    Alcohol abuse Father     Suicidality Father        Meds/Allergies     Allergies   Allergen Reactions    Penicillins Other (See Comments)     dizziness       Current Outpatient Medications:     ergocalciferol (VITAMIN D2) 50,000 units, Take 1 capsule (50,000 Units total) by mouth once a week, Disp: 12 capsule, Rfl: 1    famotidine (PEPCID) 40 MG tablet, Take 1 tablet (40 mg total) by mouth daily, Disp: 90 tablet, Rfl: 1    simvastatin (ZOCOR) 40 mg tablet, Take 1 tablet (40 mg total) by mouth daily at bedtime, Disp: 90 tablet, Rfl: 1    guaiFENesin-codeine (Guaiatussin AC) 100-10 mg/5 mL oral solution, Take 5 mL by mouth 3 (three) times a day as needed for cough (Patient not taking: Reported on 3/12/2025), Disp: 118 mL, Rfl: 0    methylPREDNISolone 4 MG tablet therapy pack, Use as directed on package (Patient not taking: Reported on 3/12/2025), Disp: 21 each, Rfl:  "0    Vitals: Blood pressure 100/78, pulse 83, height 5' 11\" (1.803 m), weight 89.4 kg (197 lb), SpO2 98%.    Body mass index is 27.48 kg/m².  Vitals:    25 1527   Weight: 89.4 kg (197 lb)     BP Readings from Last 3 Encounters:   25 100/78   25 118/78   25 110/78       Physical Exam  PHYSICAL EXAMINATION:  Neurologic:  Alert & oriented x 3, no new focal deficits, Not in any acute distress,  Constitutional:  Well developed, well nourished, non-toxic appearance   Eyes:  Pupil equal and reacting to light, conjunctiva normal, No JVP, No LNP   HENT:  Atraumatic, oropharynx moist, Neck- normal range of motion, no tenderness,  Neck supple   Respiratory:  Bilateral air entry, mostly clear to auscultation  Cardiovascular: S1-S2 regular rhythm  GI:  Soft, nondistended, normal bowel sounds, nontender, no hepatosplenomegaly appreciated.  Musculoskeletal: no tenderness, no deformities.   Skin:  Well hydrated, no rash   Lymphatic:  No lymphadenopathy noted   Extremities:  No edema and distal pulses are present    Diagnostic Studies Review Cardio:      EKG: Normal sinus rhythm, heart rate 78/min    Cardiac testing:   Results for orders placed during the hospital encounter of 20    Echo complete with contrast if indicated    Narrative  [address] [address]  [phone]    Transthoracic Echocardiogram  2D, M-mode, Doppler, and Color Doppler    Study date:  2020    Patient: RAOUL CRAVEN  MR number: [id number]  Account number: [id number]  : 1955  Age: 65 years  Gender: Male  Status: Outpatient  Location: Bedside  Height: 71 in  Weight: 195.6 lb  BP: 118/ 62 mmHg    Indications: TIA    Diagnoses: G45.9 - Transient cerebral ischemic attack, unspecified    Sonographer:  MICHELL Centeno  Referring Physician:  Negar Caballero MD  Group:  [facility]  Interpreting Physician:  Dane Garber MD    SUMMARY    LEFT " VENTRICLE:  Systolic function was normal. Ejection fraction was estimated in the range of 50 % to 55 %.  There were no regional wall motion abnormalities.    ATRIAL SEPTUM:  No defect or patent foramen ovale was identified.  Contrast injection was performed. There was no right-to-left shunt, with provocative maneuvers to increase right atrial pressure.    TRICUSPID VALVE:  There was trace regurgitation.  The tricuspid jet envelope definition was inadequate for estimation of RV systolic pressure. There are no indirect findings (abnormal RV volume or geometry, altered pulmonary flow velocity profile, or leftward septal displacement) which  would suggest moderate or severe pulmonary hypertension.    PULMONIC VALVE:  There was mild regurgitation.    HISTORY: PRIOR HISTORY: Patient has no history of cardiovascular disease.    PROCEDURE: The procedure was performed at the bedside. This was a routine study. The transthoracic approach was used. The study included complete 2D imaging, M-mode, complete spectral Doppler, and color Doppler. The heart rate was 54 bpm,  at the start of the study. Intravenous contrast (agitated saline, 10 ml) was administered to evaluate shunting. Image quality was adequate.    LEFT VENTRICLE: Size was normal. Systolic function was normal. Ejection fraction was estimated in the range of 50 % to 55 %. There were no regional wall motion abnormalities. Wall thickness was normal. No evidence of apical thrombus.  DOPPLER: Left ventricular diastolic function parameters were normal for the patient's age.    RIGHT VENTRICLE: The size was normal. Systolic function was normal. Wall thickness was normal.    LEFT ATRIUM: Size was normal.    ATRIAL SEPTUM: No defect or patent foramen ovale was identified. Contrast injection was performed. There was no right-to-left shunt, with provocative maneuvers to increase right atrial pressure.    RIGHT ATRIUM: The atrium was mildly dilated.    MITRAL VALVE: Valve  structure was normal. There was normal leaflet separation. DOPPLER: The transmitral velocity was within the normal range. There was no evidence for stenosis. There was no significant regurgitation.    AORTIC VALVE: The valve was probably trileaflet. Leaflets exhibited mildly increased thickness and normal cuspal separation. DOPPLER: Transaortic velocity was within the normal range. There was no evidence for stenosis. There was no  significant regurgitation.    TRICUSPID VALVE: The valve structure was normal. There was normal leaflet separation. DOPPLER: The transtricuspid velocity was within the normal range. There was no evidence for stenosis. There was trace regurgitation. The tricuspid jet  envelope definition was inadequate for estimation of RV systolic pressure. There are no indirect findings (abnormal RV volume or geometry, altered pulmonary flow velocity profile, or leftward septal displacement) which would suggest  moderate or severe pulmonary hypertension.    PULMONIC VALVE: Leaflets exhibited normal thickness, no calcification, and normal cuspal separation. DOPPLER: The transpulmonic velocity was within the normal range. There was mild regurgitation.    PERICARDIUM: There was no pericardial effusion. The pericardium was normal in appearance.    AORTA: The root exhibited normal size.    SYSTEMIC VEINS: IVC: The inferior vena cava was normal in size.    SYSTEM MEASUREMENT TABLES    PW  MV E/A Ratio: 1.41    [employer] Accredited Echocardiography Laboratory    Prepared and electronically signed by    Dane Garber MD  Signed 02-Nov-2020 17:43:47        Imaging:  Chest X-Ray:   No Chest XR results available for this patient.    CT-scan of the chest:     CTA head and neck with and without contrast  Result Date: 1/14/2025  Impression CT Brain: 1.  No acute intracranial CT abnormality. 2.  Mild paranasal sinus mucosal thickening. CT Angiography: 1.  Patent major vessels of the [place]  "without significant stenosis. 2.  No significant stenosis in the cervical carotid or vertebral arteries. Moderate narrowing at the left vertebral artery origin. Workstation performed: [id number]     Lab Review   Lab Results   Component Value Date    WBC 7.73 01/14/2025    HGB 13.0 01/14/2025    HCT 38.4 01/14/2025    MCV 88 01/14/2025    RDW 12.5 01/14/2025     01/14/2025     BMP:  Lab Results   Component Value Date    SODIUM 139 01/14/2025    K 3.9 01/14/2025     01/14/2025    CO2 26 01/14/2025    BUN 21 01/14/2025    CREATININE 1.06 01/14/2025    GLUC 94 01/14/2025    GLUF 83 08/05/2024    CALCIUM 8.0 (L) 01/14/2025    CORRECTEDCA 9.3 11/02/2020    EGFR 71 01/14/2025     LFT:  Lab Results   Component Value Date    AST 18 01/14/2025    ALT 15 01/14/2025    ALKPHOS 75 01/14/2025    TP 6.2 (L) 01/14/2025    ALB 3.8 01/14/2025      Lab Results   Component Value Date    PUU4LLJUOCJZ 1.691 11/01/2020     No components found for: \"TSH3\"  Lab Results   Component Value Date    HGBA1C 5.3 01/06/2023     Lipid Profile:   Lab Results   Component Value Date    CHOLESTEROL 134 08/05/2024    HDL 60 08/05/2024    LDLCALC 59 08/05/2024    TRIG 77 08/05/2024     Lab Results   Component Value Date    CHOLESTEROL 134 08/05/2024    CHOLESTEROL 248 (H) 01/06/2023     Lab Results   Component Value Date    CKTOTAL 89 01/14/2025    TROPONINI <0.02 11/01/2020     Lab Results   Component Value Date    NTBNP 28 11/01/2020      No results found for this or any previous visit (from the past 4 weeks).        Dr. Kat Rowe MD, FACC      \"This note has been constructed using a voice recognition system.Therefore there may be syntax, spelling, and/or grammatical errors. Please call if you have any questions. \"  "

## 2025-03-12 ENCOUNTER — OFFICE VISIT (OUTPATIENT)
Dept: CARDIOLOGY CLINIC | Facility: CLINIC | Age: 70
End: 2025-03-12
Payer: COMMERCIAL

## 2025-03-12 VITALS
BODY MASS INDEX: 27.58 KG/M2 | OXYGEN SATURATION: 98 % | SYSTOLIC BLOOD PRESSURE: 100 MMHG | HEIGHT: 71 IN | DIASTOLIC BLOOD PRESSURE: 78 MMHG | HEART RATE: 83 BPM | WEIGHT: 197 LBS

## 2025-03-12 DIAGNOSIS — I10 PRIMARY HYPERTENSION: ICD-10-CM

## 2025-03-12 DIAGNOSIS — R40.20 LOSS OF CONSCIOUSNESS (HCC): ICD-10-CM

## 2025-03-12 DIAGNOSIS — R00.2 PALPITATION: ICD-10-CM

## 2025-03-12 DIAGNOSIS — R55 SYNCOPE, UNSPECIFIED SYNCOPE TYPE: Primary | ICD-10-CM

## 2025-03-12 PROCEDURE — 93000 ELECTROCARDIOGRAM COMPLETE: CPT | Performed by: INTERNAL MEDICINE

## 2025-03-12 PROCEDURE — 99203 OFFICE O/P NEW LOW 30 MIN: CPT | Performed by: INTERNAL MEDICINE

## 2025-03-19 DIAGNOSIS — K21.9 GASTROESOPHAGEAL REFLUX DISEASE WITHOUT ESOPHAGITIS: ICD-10-CM

## 2025-03-20 RX ORDER — FAMOTIDINE 40 MG/1
40 TABLET, FILM COATED ORAL DAILY
Qty: 90 TABLET | Refills: 1 | Status: SHIPPED | OUTPATIENT
Start: 2025-03-20

## 2025-03-20 RX ORDER — FAMOTIDINE 40 MG/1
40 TABLET, FILM COATED ORAL DAILY
Qty: 90 TABLET | Refills: 0 | OUTPATIENT
Start: 2025-03-20

## 2025-04-07 NOTE — PROGRESS NOTES
Progress Note - Cardiology Office  [facility]    Shay Aguilar 70 y.o. male MRN: [id number]  : 1955  Encounter: [id number]      Assessment & Plan  Loss of consciousness (HCC)    Primary hypertension    Palpitation       Loss of consciousness (HCC)  Syncope VS seizure disorder    Syncope, unspecified syncope type  As per ED notes patient had a syncopal episode 30 minutes prior to admission after coming downstairs to get water, was sitting on the edge of the bed and started seizing     Patient has low normal blood pressure and states that sometimes he gets dehydrated.  Therefore it could have been an orthostatic event.  He is not orthostatic in our office today     48-hour Holter monitor 1/15/2025  Overall normal 48-hour Holter monitor.    The average heart rate was 71 bpm.  The slowest heart rate was 34 bpm at 1:43 AM.  The maximum heart rate was 138 bpm at 7:22 AM.    Total ventricular ectopic beats were 17 or 0% of total heartbeats.    Total supraventricular ectopic beats were 3648 or 1.8% of total heartbeats.  2353 were single PACs.  The longest R-R interval was 2.3 seconds at 3:19 AM.    No major sustained supraventricular arrhythmias were noted.  No major ventricular arrhythmias.  No major symptoms reported on journal entry      TTE, 2020:  EF 50-55%, trace TR, mild ME    Stress test, 2024:  Equivocal, 0.5-1.0 mm horizontal ST depression in lead III, aVF and V6 with hypertensive BP response of 2 6/80 and no symptoms    TTE, 2024:  EF 55%, mild SOFIA, mild AR, trace to mild MR    Palpitation        ASSESSMENT:  Loss of consciousness (HCC)  Syncope VS seizure disorder     Syncope, unspecified syncope type  As per ED notes patient had a syncopal episode 30 minutes prior to admission after coming downstairs to get water, was sitting on the edge of the bed and started seizing     Patient has low normal blood pressure and states that sometimes he gets dehydrated.  Therefore it  could have been an orthostatic event.  He is not orthostatic in our office today     Palpitations  48-hour Holter monitor 1/15/2025  Overall normal 48-hour Holter monitor.    The average heart rate was 71 bpm.  The slowest heart rate was 34 bpm at 1:43 AM.  The maximum heart rate was 138 bpm at 7:22 AM.    Total ventricular ectopic beats were 17 or 0% of total heartbeats.    Total supraventricular ectopic beats were 3648 or 1.8% of total heartbeats.  2353 were single PACs.  The longest R-R interval was 2.3 seconds at 3:19 AM.    No major sustained supraventricular arrhythmias were noted.  No major ventricular arrhythmias.  No major symptoms reported on journal entry        TTE, 11/2/2020:  EF 50-55%, trace TR, mild LA    Stress test, 2/8/2024:  Equivocal, 0.5-1.0 mm horizontal ST depression in lead III, aVF and V6 with hypertensive BP response of 2 6/80 and no symptoms    TTE, 2/8/2024:  EF 55%, mild SOFIA, mild AR, trace to mild MR    Palpitation          RECOMMENDATIONS:  Diet and regular cardiovascular exercise  Avoid dehydration  Consider neurology evaluation      Please call [phone] if any questions.    HPI :     Shay Aguilar is a 70 y.o. year old male who came for follow up.  Discussed the result of his cardiac testing including Holter monitor, stress test and echocardiogram which did not reveal any significant abnormality.  Advised him on adequate hydration and low-salt diet and regular cardiovascular exercise and weight loss.  Patient may also benefit from neurology evaluation.      REVIEW OF SYSTEMS:  Denies any new or acute symptoms today.  Denies chest pain, dyspnea, palpitations or syncope      Historical Information   Past Medical History:   Diagnosis Date    Cataract     Colon polyp     Dizziness     Ear problems 2020    GERD (gastroesophageal reflux disease)     Hyperlipidemia     Lightheadedness     Seasonal allergies     Tinnitus     Wears glasses      Past Surgical History:   Procedure Laterality  Date    COLONOSCOPY      GA COLONOSCOPY FLX DX W/COLLJ SPEC WHEN PFRMD N/A 2016    Procedure: COLONOSCOPY;  Surgeon: Gorge eDleon MD;  Location: [facility];  Service: Gastroenterology    GA EGD TRANSORAL BIOPSY SINGLE/MULTIPLE N/A 2016    Procedure: ESOPHAGOGASTRODUODENOSCOPY (EGD);  Surgeon: Gorge Deleon MD;  Location: [facility];  Service: Gastroenterology    TONSILLECTOMY N/A     child     Social History     Substance and Sexual Activity   Alcohol Use Yes    Comment: on occ     Social History     Substance and Sexual Activity   Drug Use No     Social History     Tobacco Use   Smoking Status Former    Current packs/day: 0.00    Average packs/day: 0.5 packs/day for 8.2 years (4.1 ttl pk-yrs)    Types: Cigarettes    Start date: 1975    Quit date: 1983    Years since quittin.1   Smokeless Tobacco Never     Family History:   Family History   Problem Relation Age of Onset    Arthritis Mother     Alzheimer's disease Mother     Other Mother         smoker-phlegm    Alcohol abuse Father     Suicidality Father        Meds/Allergies     Allergies   Allergen Reactions    Penicillins Other (See Comments)     dizziness       Current Outpatient Medications:     ergocalciferol (VITAMIN D2) 50,000 units, Take 1 capsule (50,000 Units total) by mouth once a week, Disp: 12 capsule, Rfl: 1    famotidine (PEPCID) 40 MG tablet, TAKE 1 TABLET (40 MG TOTAL) BY MOUTH DAILY, Disp: 90 tablet, Rfl: 1    simvastatin (ZOCOR) 40 mg tablet, Take 1 tablet (40 mg total) by mouth daily at bedtime, Disp: 90 tablet, Rfl: 1    guaiFENesin-codeine (Guaiatussin AC) 100-10 mg/5 mL oral solution, Take 5 mL by mouth 3 (three) times a day as needed for cough (Patient not taking: Reported on 3/12/2025), Disp: 118 mL, Rfl: 0    methylPREDNISolone 4 MG tablet therapy pack, Use as directed on package (Patient not taking: Reported on 2025), Disp: 21 each, Rfl: 0    Vitals: Blood pressure 126/80, pulse 65, weight 88.5 kg (195  lb), SpO2 99%.    Body mass index is 27.2 kg/m².  Vitals:    25 1400   Weight: 88.5 kg (195 lb)     BP Readings from Last 3 Encounters:   25 126/80   25 108/60   25 100/78       Physical Exam:  Physical Exam    Neurologic:  Alert & oriented x 3, no new focal deficits, Not in any acute distress,  Constitutional: Overweight  Eyes:  Pupil equal and reacting to light, conjunctiva normal,   HENT:  Atraumatic, oropharynx moist, Neck- normal range of motion, no tenderness,  Neck supple, No JVP, No LNP   Respiratory:  Bilateral air entry, mostly clear to auscultation  Cardiovascular: S1-S2 regular with a I/VI systolic murmur   GI:  Soft, nondistended, normal bowel sounds, nontender, no hepatosplenomegaly appreciated.  Musculoskeletal:  No tenderness, no deformities.   Skin:  Well hydrated, no rash   Lymphatic:  No lymphadenopathy noted   Extremities:  No edema and distal pulses are present        Diagnostic Studies Review Cardio:      Cardiac testing:   Results for orders placed during the hospital encounter of 20    Echo complete with contrast if indicated    [place] [address]  [phone]    Transthoracic Echocardiogram  2D, M-mode, Doppler, and Color Doppler    Study date:  2020    Patient: RAOUL CRAVEN  MR number: [id number]  Account number: [id number]  : 1955  Age: 65 years  Gender: Male  Status: Outpatient  Location: Bedside  Height: 71 in  Weight: 195.6 lb  BP: 118/ 62 mmHg    Indications: TIA    Diagnoses: G45.9 - Transient cerebral ischemic attack, unspecified    Sonographer:  MICHELL Centeno  Referring Physician:  Negar Caballero MD  Group:  [facility]  Interpreting Physician:  Dane Garber MD    SUMMARY    LEFT VENTRICLE:  Systolic function was normal. Ejection fraction was estimated in the range of 50 % to 55 %.  There were no regional wall motion abnormalities.    ATRIAL  SEPTUM:  No defect or patent foramen ovale was identified.  Contrast injection was performed. There was no right-to-left shunt, with provocative maneuvers to increase right atrial pressure.    TRICUSPID VALVE:  There was trace regurgitation.  The tricuspid jet envelope definition was inadequate for estimation of RV systolic pressure. There are no indirect findings (abnormal RV volume or geometry, altered pulmonary flow velocity profile, or leftward septal displacement) which  would suggest moderate or severe pulmonary hypertension.    PULMONIC VALVE:  There was mild regurgitation.    HISTORY: PRIOR HISTORY: Patient has no history of cardiovascular disease.    PROCEDURE: The procedure was performed at the bedside. This was a routine study. The transthoracic approach was used. The study included complete 2D imaging, M-mode, complete spectral Doppler, and color Doppler. The heart rate was 54 bpm,  at the start of the study. Intravenous contrast (agitated saline, 10 ml) was administered to evaluate shunting. Image quality was adequate.    LEFT VENTRICLE: Size was normal. Systolic function was normal. Ejection fraction was estimated in the range of 50 % to 55 %. There were no regional wall motion abnormalities. Wall thickness was normal. No evidence of apical thrombus.  DOPPLER: Left ventricular diastolic function parameters were normal for the patient's age.    RIGHT VENTRICLE: The size was normal. Systolic function was normal. Wall thickness was normal.    LEFT ATRIUM: Size was normal.    ATRIAL SEPTUM: No defect or patent foramen ovale was identified. Contrast injection was performed. There was no right-to-left shunt, with provocative maneuvers to increase right atrial pressure.    RIGHT ATRIUM: The atrium was mildly dilated.    MITRAL VALVE: Valve structure was normal. There was normal leaflet separation. DOPPLER: The transmitral velocity was within the normal range. There was no evidence for stenosis. There was no  significant regurgitation.    AORTIC VALVE: The valve was probably trileaflet. Leaflets exhibited mildly increased thickness and normal cuspal separation. DOPPLER: Transaortic velocity was within the normal range. There was no evidence for stenosis. There was no  significant regurgitation.    TRICUSPID VALVE: The valve structure was normal. There was normal leaflet separation. DOPPLER: The transtricuspid velocity was within the normal range. There was no evidence for stenosis. There was trace regurgitation. The tricuspid jet  envelope definition was inadequate for estimation of RV systolic pressure. There are no indirect findings (abnormal RV volume or geometry, altered pulmonary flow velocity profile, or leftward septal displacement) which would suggest  moderate or severe pulmonary hypertension.    PULMONIC VALVE: Leaflets exhibited normal thickness, no calcification, and normal cuspal separation. DOPPLER: The transpulmonic velocity was within the normal range. There was mild regurgitation.    PERICARDIUM: There was no pericardial effusion. The pericardium was normal in appearance.    AORTA: The root exhibited normal size.    SYSTEMIC VEINS: IVC: The inferior vena cava was normal in size.    SYSTEM MEASUREMENT TABLES    PW  MV E/A Ratio: 1.41    [employer] Accredited Echocardiography Laboratory    Prepared and electronically signed by    Dane Garber MD  Signed 02-Nov-2020 17:43:47      Results for orders placed during the hospital encounter of 01/15/25    Holter monitor    Interpretation Summary  Holter Monitor Report    Holter Report Date: 1/15/2025    Please see narrative summary for full details- scanned in EMR    Final physician interpretation:  Overall normal 48-hour Holter monitor.  The average heart rate was 71 bpm.  The slowest heart rate was 34 bpm at 1:43 AM.  The maximum heart rate was 138 bpm at 7:22 AM.  Total ventricular ectopic beats were 17 or 0% of total heartbeats.  Total  "supraventricular ectopic beats were 3648 or 1.8% of total heartbeats.  2353 were single PACs.  The longest R-R interval was 2.3 seconds at 3:19 AM.  No major sustained supraventricular arrhythmias were noted.  No major ventricular arrhythmias.  No major symptoms reported on journal entry  Interpretation: Dane Garber MD      Imaging:  Chest X-Ray:   No Chest XR results available for this patient.    CT-scan of the chest:     CTA head and neck with and without contrast  Result Date: 1/14/2025  Impression CT Brain: 1.  No acute intracranial CT abnormality. 2.  Mild paranasal sinus mucosal thickening. CT Angiography: 1.  Patent major vessels of the [place] without significant stenosis. 2.  No significant stenosis in the cervical carotid or vertebral arteries. Moderate narrowing at the left vertebral artery origin. Workstation performed: [id number]     Lab Review   Lab Results   Component Value Date    WBC 7.73 01/14/2025    HGB 13.0 01/14/2025    HCT 38.4 01/14/2025    MCV 88 01/14/2025    RDW 12.5 01/14/2025     01/14/2025     BMP:  Lab Results   Component Value Date    SODIUM 139 01/14/2025    K 3.9 01/14/2025     01/14/2025    CO2 26 01/14/2025    BUN 21 01/14/2025    CREATININE 1.06 01/14/2025    GLUC 94 01/14/2025    GLUF 83 08/05/2024    CALCIUM 8.0 (L) 01/14/2025    CORRECTEDCA 9.3 11/02/2020    EGFR 71 01/14/2025     LFT:  Lab Results   Component Value Date    AST 18 01/14/2025    ALT 15 01/14/2025    ALKPHOS 75 01/14/2025    TP 6.2 (L) 01/14/2025    ALB 3.8 01/14/2025      No components found for: \"TSH3\"  Lab Results   Component Value Date    AXP0PLNRPSCC 1.691 11/01/2020     Lab Results   Component Value Date    HGBA1C 5.3 01/06/2023     Lipid Profile:   Lab Results   Component Value Date    CHOLESTEROL 134 08/05/2024    HDL 60 08/05/2024    LDLCALC 59 08/05/2024    TRIG 77 08/05/2024     Lab Results   Component Value Date    CHOLESTEROL 134 08/05/2024    CHOLESTEROL 248 (H) 01/06/2023 "     Lab Results   Component Value Date    CKTOTAL 89 01/14/2025    TROPONINI <0.02 11/01/2020     Lab Results   Component Value Date    NTBNP 28 11/01/2020      Recent Results (from the past 4 weeks)   Echo complete w/ contrast if indicated    Collection Time: 04/08/25 10:40 AM   Result Value Ref Range    LA Volume Index (BP) 32.0 mL/m2    MV Peak A Chris 0.45 m/s    MV stenosis pressure 1/2 time 61 ms    MV Peak E Chris 78 cm/s    AV peak gradient 7 mmHg    LVOT diameter 2.0 cm    E wave deceleration time 212 ms    E/A ratio 1.73     MV valve area p 1/2 method 3.61     AV LVOT peak gradient 5 mmHg    RVID d 4.3 cm    A4C EF 68 %    Left ventricular stroke volume (2D) 57.00 mL    IVSd 1.10 cm    Tricuspid annular plane systolic excursion 2.10 cm    Ao root 3.50 cm    LVPWd 1.20 cm    LA size 3.6 cm    LA volume (BP) 57 mL    FS 36 28 - 44    LVIDS 2.80 cm    IVS 1.1 cm    LVIDd 4.40 cm    LA length (A2C) 5.00 cm    LEFT VENTRICLE SYSTOLIC VOLUME (MOD BIPLANE) 2D 29 mL    LV DIASTOLIC VOLUME (MOD BIPLANE) 2D 86 mL    Left Atrium Area-systolic Four Chamber 19.2 cm2    Left Atrium Area-systolic Apical Two Chamber 19.6 cm2    MV E' Tissue Velocity Septal 10 cm/s    LVSV, 2D 57 mL    BSA 2.1 m2    LVOT area 3.14 cm2    LV EF 55    Stress strip    Collection Time: 04/08/25 10:50 AM   Result Value Ref Range    Protocol Name NEIL     Exercise duration (min) 9 min    Exercise duration (sec) 14 sec    Post Peak Systolic  mmHg    Max Diastolic Bp 80 mmHg    Peak  BPM    Max Predicted Heart Rate 151 BPM    Reason for Termination Protocol Complete     Test Indication loss of consciousness     Target Hr Formular (220 - Age)*100%     Arrhy During Ex      ECG Interp Before Ex      ECG Interp during Ex      Ex Summary Comment      Chest Pain Statement none     Overall Hr Response To Exercise      Overall BP Response To Exercise     Stress test only, exercise    Collection Time: 04/08/25 11:12 AM   Result Value Ref Range     "Baseline HR 64 bpm    Baseline /60 mmHg    O2 sat rest 97 %    Stress peak  bpm    Post peak  mmHg    O2 sat peak 96 %    Recovery HR 78 bpm    Recovery /80 mmHg    O2 sat recovery 97 %    Max  bpm    Max HR Percent 96 %    Exercise duration (min) 9 min    Exercise duration (sec) 14 sec    Estimated workload 10.8 METS    Rate Pressure Product 28,634.0     Angina Index 0     Stress Stage Reached 3.0              Dr. Kat Rowe MD, FACC      \"This note has been constructed using a voice recognition system.Therefore there may be syntax, spelling, and/or grammatical errors. Please call if you have any questions. \"  "

## 2025-04-08 ENCOUNTER — HOSPITAL ENCOUNTER (OUTPATIENT)
Dept: NON INVASIVE DIAGNOSTICS | Facility: HOSPITAL | Age: 70
Discharge: HOME | End: 2025-04-08
Attending: INTERNAL MEDICINE
Payer: COMMERCIAL

## 2025-04-08 ENCOUNTER — RESULTS FOLLOW-UP (OUTPATIENT)
Dept: CARDIOLOGY CLINIC | Facility: CLINIC | Age: 70
End: 2025-04-08

## 2025-04-08 VITALS
BODY MASS INDEX: 27.58 KG/M2 | HEART RATE: 83 BPM | WEIGHT: 197 LBS | HEIGHT: 71 IN | DIASTOLIC BLOOD PRESSURE: 78 MMHG | SYSTOLIC BLOOD PRESSURE: 100 MMHG

## 2025-04-08 VITALS
SYSTOLIC BLOOD PRESSURE: 108 MMHG | RESPIRATION RATE: 20 BRPM | DIASTOLIC BLOOD PRESSURE: 60 MMHG | HEART RATE: 64 BPM | OXYGEN SATURATION: 97 %

## 2025-04-08 DIAGNOSIS — R40.20 LOSS OF CONSCIOUSNESS (HCC): ICD-10-CM

## 2025-04-08 DIAGNOSIS — R55 SYNCOPE, UNSPECIFIED SYNCOPE TYPE: ICD-10-CM

## 2025-04-08 LAB
AORTIC ROOT: 3.5 CM
AV LVOT PEAK GRADIENT: 5 MMHG
AV PEAK GRADIENT: 7 MMHG
BSA FOR ECHO PROCEDURE: 2.1 M2
CHEST PAIN STATEMENT: NORMAL
DOP CALC LVOT AREA: 3.14 CM2
DOP CALC LVOT DIAMETER: 2 CM
E WAVE DECELERATION TIME: 212 MS
E/A RATIO: 1.73
FRACTIONAL SHORTENING: 36 (ref 28–44)
INTERVENTRICULAR SEPTUM IN DIASTOLE (PARASTERNAL SHORT AXIS VIEW): 1.1 CM
INTERVENTRICULAR SEPTUM: 1.1 CM (ref 0.6–1.1)
LAAS-AP2: 19.6 CM2
LAAS-AP4: 19.2 CM2
LEFT ATRIUM SIZE: 3.6 CM
LEFT ATRIUM VOLUME (MOD BIPLANE): 57 ML
LEFT ATRIUM VOLUME INDEX (MOD BIPLANE): 32 ML/M2
LEFT INTERNAL DIMENSION IN SYSTOLE: 2.8 CM (ref 2.1–4)
LEFT VENTRICULAR INTERNAL DIMENSION IN DIASTOLE: 4.4 CM (ref 3.5–6)
LEFT VENTRICULAR POSTERIOR WALL IN END DIASTOLE: 1.2 CM
LEFT VENTRICULAR STROKE VOLUME: 57 ML
LV EF US.2D.A4C+ESTIMATED: 68 %
LVSV (TEICH): 57 ML
MAX DIASTOLIC BP: 80 MMHG
MAX HR PERCENT: 96 %
MAX HR: 146 BPM
MAX PREDICTED HEART RATE: 151 BPM
MV E'TISSUE VEL-SEP: 10 CM/S
MV PEAK A VEL: 0.45 M/S
MV PEAK E VEL: 78 CM/S
MV STENOSIS PRESSURE HALF TIME: 61 MS
MV VALVE AREA P 1/2 METHOD: 3.61
PROTOCOL NAME: NORMAL
RATE PRESSURE PRODUCT: NORMAL
REASON FOR TERMINATION: NORMAL
RIGHT VENTRICLE ID DIMENSION: 4.3 CM
SL CV LEFT ATRIUM LENGTH A2C: 5 CM
SL CV LV EF: 55
SL CV PED ECHO LEFT VENTRICLE DIASTOLIC VOLUME (MOD BIPLANE) 2D: 86 ML
SL CV PED ECHO LEFT VENTRICLE SYSTOLIC VOLUME (MOD BIPLANE) 2D: 29 ML
SL CV STRESS RECOVERY BP: NORMAL MMHG
SL CV STRESS RECOVERY HR: 78 BPM
SL CV STRESS RECOVERY O2 SAT: 97 %
SL CV STRESS STAGE REACHED: 3
STRESS ANGINA INDEX: 0
STRESS BASELINE BP: NORMAL MMHG
STRESS BASELINE HR: 64 BPM
STRESS O2 SAT REST: 97 %
STRESS PEAK HR: 139 BPM
STRESS POST ESTIMATED WORKLOAD: 10.8 METS
STRESS POST EXERCISE DUR MIN: 9 MIN
STRESS POST EXERCISE DUR MIN: 9 MIN
STRESS POST EXERCISE DUR SEC: 14 SEC
STRESS POST EXERCISE DUR SEC: 14 SEC
STRESS POST O2 SAT PEAK: 96 %
STRESS POST PEAK BP: 206 MMHG
STRESS POST PEAK HR: 146 BPM
STRESS POST PEAK SYSTOLIC BP: 206 MMHG
TARGET HR FORMULA: NORMAL
TEST INDICATION: NORMAL
TRICUSPID ANNULAR PLANE SYSTOLIC EXCURSION: 2.1 CM

## 2025-04-08 PROCEDURE — 93018 CV STRESS TEST I&R ONLY: CPT | Performed by: INTERNAL MEDICINE

## 2025-04-08 PROCEDURE — 93017 CV STRESS TEST TRACING ONLY: CPT

## 2025-04-08 PROCEDURE — 93306 TTE W/DOPPLER COMPLETE: CPT

## 2025-04-08 PROCEDURE — 93016 CV STRESS TEST SUPVJ ONLY: CPT | Performed by: INTERNAL MEDICINE

## 2025-04-08 PROCEDURE — 93306 TTE W/DOPPLER COMPLETE: CPT | Performed by: INTERNAL MEDICINE

## 2025-04-08 NOTE — TELEPHONE ENCOUNTER
----- Message from Kat Rowe MD sent at 4/8/2025  1:29 PM EDT -----  Please inform the patient that the stress test equivocal but showed no significant abnormality.  Blood pressure became high with exercise which usually means that the patient needs to exercise more frequently so that his body gets used to exercising

## 2025-04-08 NOTE — TELEPHONE ENCOUNTER
Patient returned call and was informed of results.  Patient confirmed appointment tomorrow at 1:20.

## 2025-04-09 ENCOUNTER — OFFICE VISIT (OUTPATIENT)
Dept: CARDIOLOGY CLINIC | Facility: CLINIC | Age: 70
End: 2025-04-09
Payer: COMMERCIAL

## 2025-04-09 VITALS
WEIGHT: 195 LBS | OXYGEN SATURATION: 99 % | DIASTOLIC BLOOD PRESSURE: 80 MMHG | HEART RATE: 65 BPM | BODY MASS INDEX: 27.2 KG/M2 | SYSTOLIC BLOOD PRESSURE: 126 MMHG

## 2025-04-09 DIAGNOSIS — R00.2 PALPITATION: ICD-10-CM

## 2025-04-09 DIAGNOSIS — I10 PRIMARY HYPERTENSION: ICD-10-CM

## 2025-04-09 DIAGNOSIS — R40.20 LOSS OF CONSCIOUSNESS (HCC): Primary | ICD-10-CM

## 2025-04-09 PROCEDURE — 99214 OFFICE O/P EST MOD 30 MIN: CPT | Performed by: INTERNAL MEDICINE

## 2025-04-28 ENCOUNTER — HOSPITAL ENCOUNTER (OUTPATIENT)
Dept: RADIOLOGY | Facility: HOSPITAL | Age: 70
Discharge: HOME | End: 2025-04-28
Payer: COMMERCIAL

## 2025-04-28 ENCOUNTER — OFFICE VISIT (OUTPATIENT)
Dept: INTERNAL MEDICINE CLINIC | Facility: CLINIC | Age: 70
End: 2025-04-28
Payer: COMMERCIAL

## 2025-04-28 VITALS
SYSTOLIC BLOOD PRESSURE: 130 MMHG | DIASTOLIC BLOOD PRESSURE: 80 MMHG | BODY MASS INDEX: 27.72 KG/M2 | HEIGHT: 71 IN | HEART RATE: 80 BPM | OXYGEN SATURATION: 98 % | WEIGHT: 198 LBS

## 2025-04-28 DIAGNOSIS — N18.31 STAGE 3A CHRONIC KIDNEY DISEASE (HCC): ICD-10-CM

## 2025-04-28 DIAGNOSIS — E78.2 MIXED HYPERLIPIDEMIA: ICD-10-CM

## 2025-04-28 DIAGNOSIS — M65.221 CALCIFIC TENDINITIS OF RIGHT ELBOW: ICD-10-CM

## 2025-04-28 DIAGNOSIS — Z00.00 ENCOUNTER FOR SUBSEQUENT ANNUAL WELLNESS VISIT (AWV) IN MEDICARE PATIENT: ICD-10-CM

## 2025-04-28 DIAGNOSIS — M65.221 CALCIFIC TENDINITIS OF RIGHT ELBOW: Primary | ICD-10-CM

## 2025-04-28 PROCEDURE — 99213 OFFICE O/P EST LOW 20 MIN: CPT | Performed by: INTERNAL MEDICINE

## 2025-04-28 PROCEDURE — G0439 PPPS, SUBSEQ VISIT: HCPCS | Performed by: INTERNAL MEDICINE

## 2025-04-28 PROCEDURE — 73080 X-RAY EXAM OF ELBOW: CPT

## 2025-04-28 RX ORDER — METHYLPREDNISOLONE 4 MG/1
TABLET ORAL
Qty: 21 EACH | Refills: 0 | Status: SHIPPED | OUTPATIENT
Start: 2025-04-28

## 2025-04-28 NOTE — ASSESSMENT & PLAN NOTE
Complaints of pain right elbow for more than 4 weeks been using over-the-counter Tylenol and ibuprofen some improvement has a Ace bandage on and was using ice packs alternating with heat    That still persistent pain denies any injury    Will treat    X-ray elbow rule out calcific tendinitis bony spur        Medrol Dosepak to use as directed    Advised physical therapy refused depending on the results further workup treatment  Orders:  •  XR elbow 3+ vw right; Future  •  methylPREDNISolone 4 MG tablet therapy pack; Use as directed on package

## 2025-04-28 NOTE — PATIENT INSTRUCTIONS
Medicare Preventive Visit Patient Instructions  Thank you for completing your Welcome to Medicare Visit or Medicare Annual Wellness Visit today. Your next wellness visit will be due in one year (4/29/2026).  The screening/preventive services that you may require over the next 5-10 years are detailed below. Some tests may not apply to you based off risk factors and/or age. Screening tests ordered at today's visit but not completed yet may show as past due. Also, please note that scanned in results may not display below.  Preventive Screenings:  Service Recommendations Previous Testing/Comments   Colorectal Cancer Screening  Colonoscopy    Fecal Occult Blood Test (FOBT)/Fecal Immunochemical Test (FIT)  Fecal DNA/Cologuard Test  Flexible Sigmoidoscopy Age: 45-75 years old   Colonoscopy: every 10 years (May be performed more frequently if at higher risk)  OR  FOBT/FIT: every 1 year  OR  Cologuard: every 3 years  OR  Sigmoidoscopy: every 5 years  Screening may be recommended earlier than age 45 if at higher risk for colorectal cancer. Also, an individualized decision between you and your healthcare provider will decide whether screening between the ages of 76-85 would be appropriate. Colonoscopy: 05/15/2024  FOBT/FIT: Not on file  Cologuard: Not on file  Sigmoidoscopy: Not on file          Prostate Cancer Screening Individualized decision between patient and health care provider in men between ages of 55-69   Medicare will cover every 12 months beginning on the day after your 50th birthday PSA: 0.9 ng/mL           Hepatitis C Screening Once for adults born between 1945 and 1965  More frequently in patients at high risk for Hepatitis C Hep C Antibody: Not on file        Diabetes Screening 1-2 times per year if you're at risk for diabetes or have pre-diabetes Fasting glucose: 83 mg/dL (8/5/2024)  A1C: 5.3 % (1/6/2023)      Cholesterol Screening Once every 5 years if you don't have a lipid disorder. May order more often  based on risk factors. Lipid panel: 08/05/2024         Other Preventive Screenings Covered by Medicare:  Abdominal Aortic Aneurysm (AAA) Screening: covered once if your at risk. You're considered to be at risk if you have a family history of AAA or a male between the age of 65-75 who smoking at least 100 cigarettes in your lifetime.  Lung Cancer Screening: covers low dose CT scan once per year if you meet all of the following conditions: (1) Age 55-77; (2) No signs or symptoms of lung cancer; (3) Current smoker or have quit smoking within the last 15 years; (4) You have a tobacco smoking history of at least 20 pack years (packs per day x number of years you smoked); (5) You get a written order from a healthcare provider.  Glaucoma Screening: covered annually if you're considered high risk: (1) You have diabetes OR (2) Family history of glaucoma OR (3)  aged 50 and older OR (4)  American aged 65 and older  Osteoporosis Screening: covered every 2 years if you meet one of the following conditions: (1) Have a vertebral abnormality; (2) On glucocorticoid therapy for more than 3 months; (3) Have primary hyperparathyroidism; (4) On osteoporosis medications and need to assess response to drug therapy.  HIV Screening: covered annually if you're between the age of 15-65. Also covered annually if you are younger than 15 and older than 65 with risk factors for HIV infection. For pregnant patients, it is covered up to 3 times per pregnancy.    Immunizations:  Immunization Recommendations   Influenza Vaccine Annual influenza vaccination during flu season is recommended for all persons aged >= 6 months who do not have contraindications   Pneumococcal Vaccine   * Pneumococcal conjugate vaccine = PCV13 (Prevnar 13), PCV15 (Vaxneuvance), PCV20 (Prevnar 20)  * Pneumococcal polysaccharide vaccine = PPSV23 (Pneumovax) Adults 19-63 yo with certain risk factors or if 65+ yo  If never received any pneumonia vaccine:  recommend Prevnar 20 (PCV20)  Give PCV20 if previously received 1 dose of PCV13 or PPSV23   Hepatitis B Vaccine 3 dose series if at intermediate or high risk (ex: diabetes, end stage renal disease, liver disease)   Respiratory syncytial virus (RSV) Vaccine - COVERED BY MEDICARE PART D  * RSVPreF3 (Arexvy) CDC recommends that adults 60 years of age and older may receive a single dose of RSV vaccine using shared clinical decision-making (SCDM)   Tetanus (Td) Vaccine - COST NOT COVERED BY MEDICARE PART B Following completion of primary series, a booster dose should be given every 10 years to maintain immunity against tetanus. Td may also be given as tetanus wound prophylaxis.   Tdap Vaccine - COST NOT COVERED BY MEDICARE PART B Recommended at least once for all adults. For pregnant patients, recommended with each pregnancy.   Shingles Vaccine (Shingrix) - COST NOT COVERED BY MEDICARE PART B  2 shot series recommended in those 19 years and older who have or will have weakened immune systems or those 50 years and older     Health Maintenance Due:      Topic Date Due   • Hepatitis C Screening  Never done   • Colorectal Cancer Screening  05/15/2027     Immunizations Due:      Topic Date Due   • Pneumococcal Vaccine: 65+ Years (1 of 1 - PCV) Never done   • Influenza Vaccine (1) Never done   • COVID-19 Vaccine (4 - 2024-25 season) 09/01/2024     Advance Directives   What are advance directives?  Advance directives are legal documents that state your wishes and plans for medical care. These plans are made ahead of time in case you lose your ability to make decisions for yourself. Advance directives can apply to any medical decision, such as the treatments you want, and if you want to donate organs.   What are the types of advance directives?  There are many types of advance directives, and each state has rules about how to use them. You may choose a combination of any of the following:  Living will:  This is a written record  of the treatment you want. You can also choose which treatments you do not want, which to limit, and which to stop at a certain time. This includes surgery, medicine, IV fluid, and tube feedings.   Durable power of  for healthcare (DPAHC):  This is a written record that states who you want to make healthcare choices for you when you are unable to make them for yourself. This person, called a proxy, is usually a family member or a friend. You may choose more than 1 proxy.  Do not resuscitate (DNR) order:  A DNR order is used in case your heart stops beating or you stop breathing. It is a request not to have certain forms of treatment, such as CPR. A DNR order may be included in other types of advance directives.  Medical directive:  This covers the care that you want if you are in a coma, near death, or unable to make decisions for yourself. You can list the treatments you want for each condition. Treatment may include pain medicine, surgery, blood transfusions, dialysis, IV or tube feedings, and a ventilator (breathing machine).  Values history:  This document has questions about your views, beliefs, and how you feel and think about life. This information can help others choose the care that you would choose.  Why are advance directives important?  An advance directive helps you control your care. Although spoken wishes may be used, it is better to have your wishes written down. Spoken wishes can be misunderstood, or not followed. Treatments may be given even if you do not want them. An advance directive may make it easier for your family to make difficult choices about your care.   Weight Management   Why it is important to manage your weight:  Being overweight increases your risk of health conditions such as heart disease, high blood pressure, type 2 diabetes, and certain types of cancer. It can also increase your risk for osteoarthritis, sleep apnea, and other respiratory problems. Aim for a slow, steady  weight loss. Even a small amount of weight loss can lower your risk of health problems.  How to lose weight safely:  A safe and healthy way to lose weight is to eat fewer calories and get regular exercise. You can lose up about 1 pound a week by decreasing the number of calories you eat by 500 calories each day.   Healthy meal plan for weight management:  A healthy meal plan includes a variety of foods, contains fewer calories, and helps you stay healthy. A healthy meal plan includes the following:  Eat whole-grain foods more often.  A healthy meal plan should contain fiber. Fiber is the part of grains, fruits, and vegetables that is not broken down by your body. Whole-grain foods are healthy and provide extra fiber in your diet. Some examples of whole-grain foods are whole-wheat breads and pastas, oatmeal, brown rice, and bulgur.  Eat a variety of vegetables every day.  Include dark, leafy greens such as spinach, kale, rajesh greens, and mustard greens. Eat yellow and orange vegetables such as carrots, sweet potatoes, and winter squash.   Eat a variety of fruits every day.  Choose fresh or canned fruit (canned in its own juice or light syrup) instead of juice. Fruit juice has very little or no fiber.  Eat low-fat dairy foods.  Drink fat-free (skim) milk or 1% milk. Eat fat-free yogurt and low-fat cottage cheese. Try low-fat cheeses such as mozzarella and other reduced-fat cheeses.  Choose meat and other protein foods that are low in fat.  Choose beans or other legumes such as split peas or lentils. Choose fish, skinless poultry (chicken or turkey), or lean cuts of red meat (beef or pork). Before you cook meat or poultry, cut off any visible fat.   Use less fat and oil.  Try baking foods instead of frying them. Add less fat, such as margarine, sour cream, regular salad dressing and mayonnaise to foods. Eat fewer high-fat foods. Some examples of high-fat foods include french fries, doughnuts, ice cream, and  cakes.  Eat fewer sweets.  Limit foods and drinks that are high in sugar. This includes candy, cookies, regular soda, and sweetened drinks.  Exercise:  Exercise at least 30 minutes per day on most days of the week. Some examples of exercise include walking, biking, dancing, and swimming. You can also fit in more physical activity by taking the stairs instead of the elevator or parking farther away from stores. Ask your healthcare provider about the best exercise plan for you.      © Copyright [employer] 2018 Information is for End User's use only and may not be sold, redistributed or otherwise used for commercial purposes. All illustrations and images included in CareNotes® are the copyrighted property of [employer].A[employer]M., Inc[employer] or [employer]

## 2025-04-28 NOTE — ASSESSMENT & PLAN NOTE
Previous LDL reviewed controlled    81 continue manage medication as follow    Zocor 40 mg daily with low-fat low-cholesterol diet no side effect previous LFT reviewed  Repeat lipid profile LFT before next visit  Orders:  •  Lipid Panel with Direct LDL reflex; Future  •  Comprehensive metabolic panel; Future

## 2025-04-28 NOTE — ASSESSMENT & PLAN NOTE
Lab Results   Component Value Date    EGFR 71 01/14/2025    EGFR 67 08/05/2024    EGFR 73 02/07/2024    CREATININE 1.06 01/14/2025    CREATININE 1.12 08/05/2024    CREATININE 1.11 02/07/2024     Now resolved GFR 71 creatinine 1.06 stable at baseline avoid nephrotoxic drug will repeat BMP  Orders:  •  Lipid Panel with Direct LDL reflex; Future  •  Comprehensive metabolic panel; Future

## 2025-04-30 ENCOUNTER — RESULTS FOLLOW-UP (OUTPATIENT)
Dept: INTERNAL MEDICINE CLINIC | Facility: CLINIC | Age: 70
End: 2025-04-30

## 2025-04-30 ENCOUNTER — TRANSCRIBE ORDERS (OUTPATIENT)
Dept: LAB | Facility: CLINIC | Age: 70
End: 2025-04-30

## 2025-04-30 ENCOUNTER — APPOINTMENT (OUTPATIENT)
Dept: LAB | Facility: CLINIC | Age: 70
End: 2025-04-30
Attending: INTERNAL MEDICINE
Payer: COMMERCIAL

## 2025-04-30 DIAGNOSIS — N18.31 STAGE 3A CHRONIC KIDNEY DISEASE (HCC): ICD-10-CM

## 2025-04-30 DIAGNOSIS — E78.2 MIXED HYPERLIPIDEMIA: ICD-10-CM

## 2025-04-30 DIAGNOSIS — R55 SYNCOPE, CONVULSIVE: ICD-10-CM

## 2025-04-30 LAB
ALBUMIN SERPL BCG-MCNC: 4.4 G/DL (ref 3.5–5)
ALP SERPL-CCNC: 88 U/L (ref 34–104)
ALT SERPL W P-5'-P-CCNC: 17 U/L (ref 7–52)
ANION GAP SERPL CALCULATED.3IONS-SCNC: 10 MMOL/L (ref 4–13)
AST SERPL W P-5'-P-CCNC: 20 U/L (ref 13–39)
BILIRUB SERPL-MCNC: 0.6 MG/DL (ref 0.2–1)
BUN SERPL-MCNC: 16 MG/DL (ref 5–25)
CALCIUM SERPL-MCNC: 9.2 MG/DL (ref 8.4–10.2)
CHLORIDE SERPL-SCNC: 104 MMOL/L (ref 96–108)
CHOLEST SERPL-MCNC: 185 MG/DL (ref ?–200)
CO2 SERPL-SCNC: 25 MMOL/L (ref 21–32)
CREAT SERPL-MCNC: 1.12 MG/DL (ref 0.6–1.3)
GFR SERPL CREATININE-BSD FRML MDRD: 66 ML/MIN/1.73SQ M
GLUCOSE P FAST SERPL-MCNC: 128 MG/DL (ref 65–99)
HDLC SERPL-MCNC: 78 MG/DL
LDLC SERPL CALC-MCNC: 98 MG/DL (ref 0–100)
POTASSIUM SERPL-SCNC: 4.2 MMOL/L (ref 3.5–5.3)
PROT SERPL-MCNC: 6.9 G/DL (ref 6.4–8.4)
SODIUM SERPL-SCNC: 139 MMOL/L (ref 135–147)
TRIGL SERPL-MCNC: 43 MG/DL (ref ?–150)

## 2025-04-30 PROCEDURE — 80053 COMPREHEN METABOLIC PANEL: CPT

## 2025-04-30 PROCEDURE — 36415 COLL VENOUS BLD VENIPUNCTURE: CPT

## 2025-04-30 PROCEDURE — 80061 LIPID PANEL: CPT

## 2025-05-03 ENCOUNTER — RESULTS FOLLOW-UP (OUTPATIENT)
Dept: INTERNAL MEDICINE CLINIC | Facility: CLINIC | Age: 70
End: 2025-05-03

## 2025-05-28 ENCOUNTER — OFFICE VISIT (OUTPATIENT)
Dept: INTERNAL MEDICINE CLINIC | Facility: CLINIC | Age: 70
End: 2025-05-28
Payer: COMMERCIAL

## 2025-05-28 VITALS
OXYGEN SATURATION: 97 % | WEIGHT: 197.4 LBS | HEIGHT: 71 IN | TEMPERATURE: 98 F | HEART RATE: 73 BPM | BODY MASS INDEX: 27.64 KG/M2 | DIASTOLIC BLOOD PRESSURE: 80 MMHG | SYSTOLIC BLOOD PRESSURE: 124 MMHG

## 2025-05-28 DIAGNOSIS — S86.812A STRAIN OF CALF MUSCLE, LEFT, INITIAL ENCOUNTER: ICD-10-CM

## 2025-05-28 DIAGNOSIS — R05.9 COUGH, UNSPECIFIED TYPE: ICD-10-CM

## 2025-05-28 DIAGNOSIS — M65.221 CALCIFIC TENDINITIS OF RIGHT ELBOW: Primary | ICD-10-CM

## 2025-05-28 PROBLEM — Z00.00 ENCOUNTER FOR SUBSEQUENT ANNUAL WELLNESS VISIT (AWV) IN MEDICARE PATIENT: Status: RESOLVED | Noted: 2023-01-06 | Resolved: 2025-05-28

## 2025-05-28 PROCEDURE — 99213 OFFICE O/P EST LOW 20 MIN: CPT

## 2025-05-28 PROCEDURE — 87811 SARS-COV-2 COVID19 W/OPTIC: CPT

## 2025-05-28 NOTE — PROGRESS NOTES
Name: Shay Aguilar      : 1955      MRN: [id number]  Encounter Provider: Zaira Duncan MD  Encounter Date: 2025   Encounter department: [facility] INTERNAL MEDICINE  :  Assessment & Plan  Calcific tendinitis of right elbow  Likely sustained after moving heavy fences outside his home  Conservative measures with NSAIDs, heat application and brace has been helping  Would likely benefit from PT for continued improvement of pain and range of motion  Orders:  •  Ambulatory Referral to Physical Therapy; Future    Strain of calf muscle, left, initial encounter  Stiffness detected over the past several days --- likely secondary to strain  Requesting PT for pain alleviation  Orders:  •  Ambulatory Referral to Physical Therapy; Future    Cough, unspecified type  Rapid COVID test was negative  Continue supportive measures at home for symptom management  If symptoms persist or worsen over the next week, follow-up for reevaluation  Orders:  •  POCT Rapid Covid Ag      Return for Next scheduled follow up.     History of Present Illness   HPI  70-year-old male presents with multiple complaints including an elbow pain following an injury, leg pain/tightness, and a persistent cough.     The patient reports injuring his right elbow while lifting and carrying some heavy fences without assistance. He suspects the injury was caused by a twisting motion during this activity. The pain is localized to the elbow and is exacerbated when he moves it.  He has been managing the pain with Advil and a heating pad, which he reports as helpful. 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    The patient also complains of pain and tightness in his left calf that has been present for a couple of days. He notes difficulty bending his leg as he used to and is currently using compression socks on both legs. The patient reports relief when stretching his leg. He performs 12-hour shifts on  "Thursdays and Fridays, which may contribute to his symptoms.  He denies any recent change in activity including prolonged sitting.    Additionally, the patient has been experiencing a productive cough with mucus and a runny nose for about a week. He attributes this to possible allergies from working outside with weeds and grass. He has been managing his symptoms with over-the-counter chest congestion relief syrup containing DM, which he finds helpful.     Review of Systems   Constitutional:  Negative for chills and fever.   HENT:  Negative for congestion and sore throat.    Eyes:  Negative for visual disturbance.   Respiratory:  Positive for cough. Negative for shortness of breath.    Cardiovascular:  Negative for chest pain.   Gastrointestinal:  Negative for abdominal pain.   Genitourinary:  Negative for dysuria.   Musculoskeletal:  Positive for myalgias.   Neurological:  Negative for headaches.   Psychiatric/Behavioral:  Negative for confusion.        Objective   /80   Pulse 73   Temp 98 °F (36.7 °C)   Ht 5' 11\" (1.803 m)   Wt 89.5 kg (197 lb 6.4 oz)   SpO2 97%   BMI 27.53 kg/m²      Physical Exam  Vitals and nursing note reviewed.   Constitutional:       General: He is not in acute distress.     Appearance: Normal appearance. He is not ill-appearing or toxic-appearing.   HENT:      Head: Normocephalic.     Eyes:      General:         Right eye: No discharge.         Left eye: No discharge.      Extraocular Movements: Extraocular movements intact.      Conjunctiva/sclera: Conjunctivae normal.       Cardiovascular:      Rate and Rhythm: Normal rate.      Pulses: Normal pulses.   Pulmonary:      Effort: Pulmonary effort is normal. No respiratory distress.     Musculoskeletal:         General: Tenderness present. No swelling. Normal range of motion.      Cervical back: Normal range of motion.     Skin:     General: Skin is warm and dry.     Neurological:      Mental Status: He is alert and oriented to " person, place, and time.      Sensory: No sensory deficit.      Motor: No weakness.      Coordination: Coordination normal.      Gait: Gait normal.     Psychiatric:         Mood and Affect: Mood normal.         Behavior: Behavior normal.

## 2025-05-28 NOTE — ASSESSMENT & PLAN NOTE
Likely sustained after moving heavy fences outside his home  Conservative measures with NSAIDs, heat application and brace has been helping  Would likely benefit from PT for continued improvement of pain and range of motion  Orders:  •  Ambulatory Referral to Physical Therapy; Future

## 2025-05-29 LAB
SARS-COV-2 AG UPPER RESP QL IA: NEGATIVE
VALID CONTROL: NORMAL

## 2025-06-02 ENCOUNTER — EVALUATION (OUTPATIENT)
Dept: OCCUPATIONAL THERAPY | Facility: CLINIC | Age: 70
End: 2025-06-02
Payer: COMMERCIAL

## 2025-06-02 DIAGNOSIS — M65.221 CALCIFIC TENDINITIS OF RIGHT ELBOW: Primary | ICD-10-CM

## 2025-06-02 PROCEDURE — 97110 THERAPEUTIC EXERCISES: CPT

## 2025-06-02 PROCEDURE — 97166 OT EVAL MOD COMPLEX 45 MIN: CPT

## 2025-06-02 NOTE — PROGRESS NOTES
OT Evaluation     Today's date: 2025  Patient name: Shay Aguilar  : 1955  MRN: [id number]  Referring provider: Zaira Duncan MD  Dx:   Encounter Diagnosis     ICD-10-CM    1. Calcific tendinitis of right elbow  M65.221                      Assessment  Impairments: activity intolerance, impaired physical strength, lacks appropriate home exercise program, pain with function and weight-bearing intolerance  Symptom irritability: high  Understanding of Dx/Px/POC: good     Prognosis: good    Goals  Short Term Goals by 2 - 4 weeks:    Establish HEP to increase performance with daily activities.     Patient will increase RUE  strength by at least 10 lbs to assist in completing household chores.    Patient will decrease pain rate of UE by at least 2 points per the VAS scale.             Long Term Goals by discharge:    Establish final home exercise program to enhance maximal functional level with ADLs.                            Achieve functional strength of RUE for full return to high level ADLs.       Plan  Patient would benefit from: skilled occupational therapy, OT eval, custom splinting and orthotics    Planned therapy interventions: activity modification, IASTM, joint mobilization, kinesiology taping, manual therapy, massage, balance/weight bearing training, behavior modification, body mechanics training, neuromuscular re-education, nerve gliding, orthotic fitting/training, orthotic management and training, patient/caregiver education, strengthening, stretching, therapeutic activities, therapeutic exercise, IADL retraining, home exercise program, graded motor, graded exercise, graded activity, functional ROM exercises and flexibility    Frequency: 1-2x week  Duration in weeks: 6  Plan of Care beginning date: 2025  Plan of Care expiration date: 2025  Treatment plan discussed with: patient  Plan details: Focus on HEP, A/AA/PROM, TE, TA, stretching, strengthening, manual therapy, modalities PRN  to improve ability to complete daily activites with ease.  POC 2025 - 2025     Subjective Evaluation    History of Present Illness  Mechanism of injury: Patient is a 70 y.o. male who presents for OT IE and treatment for Calcific tendinitis of right elbow. Patient reports injuring his right elbow while lifting and carrying some heavy fences without assistance. He suspects the injury was caused by a twisting motion during this activity. The pain is localized to the elbow and is exacerbated when he moves it, states tenderness to touch at the lateral epicondyle and has been wearing an elbow strap  for the past 2 weeks which has been helping. Patient saw physician on 2025 regarding elbow pain. Patient referred by Dr. Zaira Duncan to initiate treatment including hand therapy.      Quality of life: good    Patient Goals  Patient goals for therapy: increased strength, independence with ADLs/IADLs, increased motion, decreased pain and return to sport/leisure activities    Pain  Current pain ratin  At worst pain ratin  Quality: burning and sharp  Aggravating factors: lifting (driving, work tasks of typing/paperwork)    Social Support    Employment status: working  Hand dominance: right      Objective     Active Range of Motion     Left Wrist   Normal active range of motion    Right Wrist   Normal active range of motion    Left Thumb   Kapandji score: 10 degrees      Right Thumb   Kapandji score: 10 degrees    Strength/Myotome Testing     Left Wrist/Hand      (2nd hand position)     Trial 1: 105    Comments: Pronated L: 95  Pronated R: 45     Thumb Strength  Key/Lateral Pinch     Trial 1: 17    Right Wrist/Hand      (2nd hand position)     Trial 1: 45    Thumb Strength   Key/Lateral Pinch     Trial 1: 15    Tests     Left Elbow   Negative Cozen's and Maudsley's.     Right Elbow   Positive Cozen's and Maudsley's.              Precautions: Universal      Auth Tracker  Auth Status Total    Visits  Auth start date Expiration date Co-Insurance   Pending                                       Visit Tracker  Date IE  6/2                                                                                      PMHx:   has a past medical history of Cataract, Colon polyp, Dizziness, Ear problems (2020), GERD (gastroesophageal reflux disease), Hyperlipidemia, Lightheadedness, Seasonal allergies, Tinnitus, and Wears glasses.         Manuals HEP 6/2/2025   STM R lateral epi x X5 min                  Ther Ex     Education on HEP and dx  x5min        Lateral epi stretches 4 ways with fist closed and opened, elbow flexed and extended x 3 x 10-15 sec hold   Wrist flexor stretch  x 3 x 10 sec hold   Wrist extensor stretch  x 3 x 10 sec hold                       Ther Act                     Modalities     MHP              Assessment:   Patient tolerated session well. Patient demonstrates decreased  and pinch strength compared to contralateral side as well as increased discomfort at R lateral epicondyle during AROM screen upon assessment today. Provacative testing performed in which patient had positive Cozen's and Maudsley's test with pain over the extensor muscles of the RUE. Patient session focused on patient education on anatomy and physiology concerning current dx, techniques for decreasing deficits through HEP, and appropriate use of modalities. Patient educated on HEP to include wrist extensor stretching, soft tissue massage at lateral epicondyle and extensor wad as well as education regarding proper form when lifting heavier objects with verbal instructions and handouts for patient reference. Patient educated on treatment plan at this time. Patient benefiting from skilled hand therapy OT to reduce deficits to improve independence with daily activities.        Plan:   Focus on HEP, A/AA/PROM, TE, TA, stretching, strengthening, manual therapy, modalities PRN to improve ability to complete daily activites with  ease.  POC 6/2/2025 - 7 /14/2025

## 2025-06-16 ENCOUNTER — RA CDI HCC (OUTPATIENT)
Dept: OTHER | Facility: HOSPITAL | Age: 70
End: 2025-06-16

## 2025-06-18 ENCOUNTER — APPOINTMENT (OUTPATIENT)
Dept: OCCUPATIONAL THERAPY | Facility: CLINIC | Age: 70
End: 2025-06-18
Payer: COMMERCIAL

## 2025-06-23 ENCOUNTER — OFFICE VISIT (OUTPATIENT)
Dept: OCCUPATIONAL THERAPY | Facility: CLINIC | Age: 70
End: 2025-06-23
Payer: COMMERCIAL

## 2025-06-23 ENCOUNTER — OFFICE VISIT (OUTPATIENT)
Dept: INTERNAL MEDICINE CLINIC | Facility: CLINIC | Age: 70
End: 2025-06-23
Payer: COMMERCIAL

## 2025-06-23 VITALS
HEART RATE: 79 BPM | OXYGEN SATURATION: 96 % | DIASTOLIC BLOOD PRESSURE: 80 MMHG | HEIGHT: 71 IN | SYSTOLIC BLOOD PRESSURE: 126 MMHG | BODY MASS INDEX: 27.44 KG/M2 | WEIGHT: 196 LBS

## 2025-06-23 DIAGNOSIS — K21.00 GASTROESOPHAGEAL REFLUX DISEASE WITH ESOPHAGITIS WITHOUT HEMORRHAGE: ICD-10-CM

## 2025-06-23 DIAGNOSIS — E78.2 MIXED HYPERLIPIDEMIA: Primary | ICD-10-CM

## 2025-06-23 DIAGNOSIS — M65.221 CALCIFIC TENDINITIS OF RIGHT ELBOW: Primary | ICD-10-CM

## 2025-06-23 DIAGNOSIS — M65.221 CALCIFIC TENDINITIS OF RIGHT ELBOW: ICD-10-CM

## 2025-06-23 PROCEDURE — 97140 MANUAL THERAPY 1/> REGIONS: CPT

## 2025-06-23 PROCEDURE — 97110 THERAPEUTIC EXERCISES: CPT

## 2025-06-23 PROCEDURE — 99213 OFFICE O/P EST LOW 20 MIN: CPT | Performed by: INTERNAL MEDICINE

## 2025-06-23 NOTE — ASSESSMENT & PLAN NOTE
Symptoms controlled continue Pepcid as needed I did instruct about the diet antireflux measures discussed for now asymptomatic

## 2025-06-23 NOTE — PROGRESS NOTES
Name: Shay Aguilar      : 1955      MRN: [id number]  Encounter Provider: Alber Matthews MD  Encounter Date: 2025   Encounter department: [facility] INTERNAL MEDICINE  :  Assessment & Plan  Calcific tendinitis of right elbow  Slowly improved undergoing physical therapy using over-the-counter ibuprofen as needed seems to be helping continue physical therapy seen by orthopedist       Mixed hyperlipidemia  Previous LDL reviewed controlled LFT normal no side effect agree continue manage medications follow    Previous LDL reviewed controlled    80 continue manage medication as follow    Zocor 40 mg daily with low-fat low-cholesterol diet no side effect previous          Gastroesophageal reflux disease with esophagitis without hemorrhage  Symptoms controlled continue Pepcid as needed I did instruct about the diet antireflux measures discussed for now asymptomatic               History of Present Illness   Came in follow-up for the tendinitis right elbow undergoing physical therapy been using ibuprofen seems to be helping much improved no new symptoms no chest pain difficulty breathing for details refer to assessment plan visit diagnosis      Review of Systems   Constitutional:  Positive for activity change. Negative for appetite change, chills, diaphoresis, fatigue, fever and unexpected weight change.   HENT:  Negative for congestion, dental problem, drooling, ear discharge, ear pain, facial swelling, hearing loss, mouth sores, nosebleeds, postnasal drip, rhinorrhea, sinus pressure, sneezing, sore throat, tinnitus, trouble swallowing and voice change.    Eyes:  Negative for photophobia, pain, discharge, redness, itching and visual disturbance.   Respiratory:  Negative for apnea, cough, choking, chest tightness, shortness of breath, wheezing and stridor.    Cardiovascular:  Negative for chest pain, palpitations and leg swelling.   Gastrointestinal:  Negative for abdominal distention, abdominal  "pain, anal bleeding, blood in stool, constipation, diarrhea, nausea, rectal pain and vomiting.   Endocrine: Negative for cold intolerance, heat intolerance, polydipsia, polyphagia and polyuria.   Genitourinary:  Negative for decreased urine volume, difficulty urinating, dysuria, enuresis, flank pain, frequency, genital sores, hematuria and urgency.   Musculoskeletal:  Positive for arthralgias and myalgias. Negative for back pain, gait problem, joint swelling, neck pain and neck stiffness.   Skin:  Negative for color change, pallor, rash and wound.   Allergic/Immunologic: Negative.  Negative for environmental allergies, food allergies and immunocompromised state.   Neurological:  Negative for dizziness, tremors, seizures, syncope, facial asymmetry, speech difficulty, weakness, light-headedness, numbness and headaches.   Psychiatric/Behavioral:  Negative for agitation, behavioral problems, confusion, decreased concentration, dysphoric mood, hallucinations, self-injury, sleep disturbance and suicidal ideas. The patient is not nervous/anxious and is not hyperactive.        Objective   /80   Pulse 79   Ht 5' 11\" (1.803 m)   Wt 88.9 kg (196 lb)   SpO2 96%   BMI 27.34 kg/m²      Physical Exam  Vitals and nursing note reviewed.   Constitutional:       General: He is not in acute distress.     Appearance: He is well-developed. He is not ill-appearing, toxic-appearing or diaphoretic.   HENT:      Head: Normocephalic and atraumatic.      Right Ear: External ear normal.      Left Ear: External ear normal.      Mouth/Throat:      Pharynx: No oropharyngeal exudate.     Eyes:      General: Lids are normal. Lids are everted, no foreign bodies appreciated. No scleral icterus.        Right eye: No discharge.         Left eye: No discharge.      Conjunctiva/sclera: Conjunctivae normal.      Pupils: Pupils are equal, round, and reactive to light.     Neck:      Thyroid: No thyromegaly.      Vascular: Normal carotid pulses. " No carotid bruit, hepatojugular reflux or JVD.      Trachea: No tracheal tenderness or tracheal deviation.     Cardiovascular:      Rate and Rhythm: Normal rate and regular rhythm.      Pulses: Normal pulses.      Heart sounds: Normal heart sounds. No murmur heard.     No friction rub. No gallop.   Pulmonary:      Effort: Pulmonary effort is normal. No respiratory distress.      Breath sounds: No stridor. No wheezing or rales.   Chest:      Chest wall: No tenderness.   Abdominal:      General: Bowel sounds are normal. There is no distension.      Palpations: Abdomen is soft. There is no mass.      Tenderness: There is no abdominal tenderness. There is no guarding or rebound.     Musculoskeletal:         General: No tenderness or deformity. Normal range of motion.      Cervical back: Normal range of motion and neck supple. No edema, erythema or rigidity. No spinous process tenderness or muscular tenderness. Normal range of motion.   Lymphadenopathy:      Head:      Right side of head: No submental, submandibular, tonsillar, preauricular or posterior auricular adenopathy.      Left side of head: No submental, submandibular, tonsillar, preauricular, posterior auricular or occipital adenopathy.      Cervical: No cervical adenopathy.      Right cervical: No superficial, deep or posterior cervical adenopathy.     Left cervical: No superficial, deep or posterior cervical adenopathy.      Upper Body:      Right upper body: No pectoral adenopathy.      Left upper body: No pectoral adenopathy.     Skin:     General: Skin is warm and dry.      Coloration: Skin is not pale.      Findings: No erythema or rash.     Neurological:      General: No focal deficit present.      Mental Status: He is alert and oriented to person, place, and time.      Cranial Nerves: No cranial nerve deficit.      Sensory: No sensory deficit.      Motor: No tremor, abnormal muscle tone or seizure activity.      Coordination: Coordination normal.       Gait: Gait normal.      Deep Tendon Reflexes: Reflexes are normal and symmetric. Reflexes normal.     Psychiatric:         Behavior: Behavior normal.         Thought Content: Thought content normal.         Judgment: Judgment normal.

## 2025-06-23 NOTE — ASSESSMENT & PLAN NOTE
Previous LDL reviewed controlled LFT normal no side effect agree continue manage medications follow    Previous LDL reviewed controlled    80 continue manage medication as follow    Zocor 40 mg daily with low-fat low-cholesterol diet no side effect previous

## 2025-06-23 NOTE — PROGRESS NOTES
"Daily Note     Today's date: 2025  Patient name: Shay Aguilar  : 1955  MRN: [id number]  Referring provider: Zaira Duncan MD  Dx:   Encounter Diagnosis     ICD-10-CM    1. Calcific tendinitis of right elbow  M65.221                      Subjective: Patient reports, \"I had to cut the grass, cleaned out 2 dehumidifiers and put in 3 window air conditioners I'm getting stronger but I have things to do they have to get done. I take advil as needed not all the time and that seems to help and I've been doing the stretches you gave me\".         Objective: See treatment diary below       Precautions: Universal      Auth Tracker  Auth Status Total   Visits  Auth start date Expiration date Co-Insurance   Approved 25                                    Visit Tracker  Date IE               X                                                                        PMHx:   has a past medical history of Cataract, Colon polyp, Dizziness, Ear problems (), GERD (gastroesophageal reflux disease), Hyperlipidemia, Lightheadedness, Seasonal allergies, Tinnitus, and Wears glasses.         Manuals HEP 2025   STM R lateral epi x X8 min                  Ther Ex     Education on HEP and dx  x5min   Lateral epi stretches 4 ways with fist closed and opened, elbow flexed and extended x 3 x 10-15 sec hold   Wrist flexor stretch  x 3 x 10 sec hold   Wrist extensor stretch  x 3 x 10 sec hold   Flexbar twist  X10 green    Eccentric wrist PRE's  X10 #2   Tabletop stretch   3 x 10 sec hold    Towel crawls with wrist ext   X5                   Ther Act                     Modalities     MHP  X5 min    Kinesiotape R lateral epicondyle  x2 min        Assessment:   Patient tolerated session well, able to perform all TE with good activity tolerance and minimal complaints of soreness at the lateral epicondyle. Patient session focused on patient education on anatomy and physiology concerning current dx, techniques " for decreasing deficits through HEP, and appropriate use of modalities. Therapist reviewed HEP to include wrist extensor stretching, soft tissue massage at lateral epicondyle and extensor wad as well as education regarding proper form when lifting heavier objects with verbal instructions for patient reference. Patient educated on treatment plan at this time. Kinesio tape was placed at the lateral epicondyle of the RUE for support, positioning and protection to facilitate improvement in symptoms. Reviewed with patient about potential adverse effects of the Kinesiotape.  Patient benefiting from skilled hand therapy OT to reduce deficits to improve independence with daily activities.        Plan:   Focus on HEP, A/AA/PROM, TE, TA, stretching, strengthening, manual therapy, modalities PRN to improve ability to complete daily activites with ease.  POC 6/2/2025 - 7 /14/2025

## 2025-06-23 NOTE — ASSESSMENT & PLAN NOTE
Slowly improved undergoing physical therapy using over-the-counter ibuprofen as needed seems to be helping continue physical therapy seen by orthopedist

## 2025-06-25 ENCOUNTER — OFFICE VISIT (OUTPATIENT)
Dept: OCCUPATIONAL THERAPY | Facility: CLINIC | Age: 70
End: 2025-06-25
Payer: COMMERCIAL

## 2025-06-25 DIAGNOSIS — M65.221 CALCIFIC TENDINITIS OF RIGHT ELBOW: Primary | ICD-10-CM

## 2025-06-25 PROCEDURE — 97110 THERAPEUTIC EXERCISES: CPT

## 2025-06-25 PROCEDURE — 97140 MANUAL THERAPY 1/> REGIONS: CPT

## 2025-06-25 NOTE — PROGRESS NOTES
"Daily Note     Today's date: 2025  Patient name: Shay Aguilar  : 1955  MRN: [id number]  Referring provider: Zaira Duncan MD  Dx:   Encounter Diagnosis     ICD-10-CM    1. Calcific tendinitis of right elbow  M65.221                    Subjective: Patient reports, \"My elbow is feeling better now, the tape was so helpful but I took it off it was bothering me like peeling up but I haven't been lifting anything too heavy like AC units\".        Objective: See treatment diary below       Precautions: Universal      Auth Tracker  Auth Status Total   Visits  Auth start date Expiration date Co-Insurance   Approved 25                                    Visit Tracker  Date IE              X                                                                        PMHx:   has a past medical history of Cataract, Colon polyp, Dizziness, Ear problems (), GERD (gastroesophageal reflux disease), Hyperlipidemia, Lightheadedness, Seasonal allergies, Tinnitus, and Wears glasses.         Manuals HEP 2025   STM R lateral epi x X8 min                  Ther Ex     Education on HEP and dx  x5min   Lateral epi stretches 4 ways with fist closed and opened, elbow flexed and extended x 3 x 10-15 sec hold   Wrist flexor stretch  x 3 x 10 sec hold   Wrist extensor stretch  x 3 x 10 sec hold   Flexbar twist  X10 green    Eccentric wrist PRE's  X10 #2   Tabletop stretch   3 x 10 sec hold    Towel crawls with wrist ext   X5    Supination/pronation hammer neutral to pronation   X10 #2             Ther Act                     Modalities     MHP  X5 min    Kinesiotape R lateral epicondyle  x2 min        Assessment:   Patient tolerated session well, able to perform all TE with good activity tolerance and minimal complaints of soreness at the lateral epicondyle. Patient session focused on patient education on anatomy and physiology concerning current dx, techniques for decreasing deficits through HEP, and " appropriate use of modalities.  Patient educated on treatment plan at this time. Kinesio tape was placed at the lateral epicondyle of the RUE for support, positioning and protection to facilitate improvement in symptoms. Reviewed with patient about potential adverse effects of the Kinesiotape. Patient benefiting from skilled hand therapy OT to reduce deficits to improve independence with daily activities.        Plan:   Focus on HEP, A/AA/PROM, TE, TA, stretching, strengthening, manual therapy, modalities PRN to improve ability to complete daily activites with ease.  POC 6/2/2025 - 7 /14/2025

## 2025-06-30 ENCOUNTER — APPOINTMENT (OUTPATIENT)
Dept: OCCUPATIONAL THERAPY | Facility: CLINIC | Age: 70
End: 2025-06-30
Payer: COMMERCIAL

## 2025-07-01 ENCOUNTER — OFFICE VISIT (OUTPATIENT)
Dept: OCCUPATIONAL THERAPY | Facility: CLINIC | Age: 70
End: 2025-07-01
Payer: COMMERCIAL

## 2025-07-01 DIAGNOSIS — M65.221 CALCIFIC TENDINITIS OF RIGHT ELBOW: Primary | ICD-10-CM

## 2025-07-01 PROCEDURE — 97110 THERAPEUTIC EXERCISES: CPT

## 2025-07-01 PROCEDURE — 97140 MANUAL THERAPY 1/> REGIONS: CPT

## 2025-07-01 NOTE — PROGRESS NOTES
"Daily Note     Today's date: 2025  Patient name: Shay Aguilar  : 1955  MRN: [id number]  Referring provider: Zaira Duncan MD  Dx:   Encounter Diagnosis     ICD-10-CM    1. Calcific tendinitis of right elbow  M65.221                    Subjective: Patient reports, \"My elbow is feeling better okay I'm wearing the elbow compression sleeve\".        Objective: See treatment diary below       Precautions: Universal      Auth Tracker  Auth Status Total   Visits  Auth start date Expiration date Co-Insurance   Approved 12 25                                    Visit Tracker  Date IE             X                                                                        PMHx:   has a past medical history of Cataract, Colon polyp, Dizziness, Ear problems (), GERD (gastroesophageal reflux disease), Hyperlipidemia, Lightheadedness, Seasonal allergies, Tinnitus, and Wears glasses.         Manuals HEP 2025   STM R lateral epi x X8 min                  Ther Ex     Education on HEP and dx  x5min   Lateral epi stretches 4 ways with fist closed and opened, elbow flexed and extended x 3 x 10-15 sec hold   Wrist flexor stretch  x 3 x 10 sec hold   Wrist extensor stretch  x 3 x 10 sec hold   Flexbar twist  X10 green    Eccentric wrist PRE's  X10 #3 (upgraded this session)   Tabletop stretch   3 x 10 sec hold    Towel crawls with wrist ext   X5    Supination/pronation hammer neutral to pronation   X10 #2             Ther Act                     Modalities     MHP  X5 min    Kinesiotape R lateral epicondyle  x2 min        Assessment:   Patient tolerated session well, able to perform upgrades in TE with good activity tolerance and minimal complaints of soreness at the lateral epicondyle. Patient session focused on patient education on anatomy and physiology concerning current dx, techniques for decreasing deficits through HEP, and appropriate use of modalities. Kinesio tape was placed at the " lateral epicondyle of the RUE for support, positioning and protection to facilitate improvement in symptoms. Reviewed with patient about potential adverse effects of the Kinesiotape. Patient benefiting from skilled hand therapy OT to reduce deficits to improve independence with daily activities.          Plan:   Focus on HEP, A/AA/PROM, TE, TA, stretching, strengthening, manual therapy, modalities PRN to improve ability to complete daily activites with ease.  POC 6/2/2025 - 7 /14/2025

## 2025-07-08 ENCOUNTER — OFFICE VISIT (OUTPATIENT)
Age: 70
End: 2025-07-08
Payer: COMMERCIAL

## 2025-07-08 VITALS
HEART RATE: 77 BPM | OXYGEN SATURATION: 97 % | DIASTOLIC BLOOD PRESSURE: 76 MMHG | HEIGHT: 71 IN | SYSTOLIC BLOOD PRESSURE: 122 MMHG | BODY MASS INDEX: 27.54 KG/M2 | WEIGHT: 196.7 LBS

## 2025-07-08 DIAGNOSIS — M65.221 CALCIFIC TENDINITIS OF RIGHT ELBOW: ICD-10-CM

## 2025-07-08 DIAGNOSIS — L03.116 CELLULITIS OF LEFT LOWER EXTREMITY: Primary | ICD-10-CM

## 2025-07-08 PROCEDURE — 99213 OFFICE O/P EST LOW 20 MIN: CPT | Performed by: INTERNAL MEDICINE

## 2025-07-08 RX ORDER — CLINDAMYCIN HYDROCHLORIDE 300 MG/1
300 CAPSULE ORAL 3 TIMES DAILY
Qty: 21 CAPSULE | Refills: 0 | Status: SHIPPED | OUTPATIENT
Start: 2025-07-08 | End: 2025-07-15

## 2025-07-08 NOTE — ASSESSMENT & PLAN NOTE
As mentioned in the history of present illness patient developed redness left leg medial aspect not sure if there is an insect bite.  Will start the patient on clindamycin patient allergic to penicillin.  Patient should report in case this redness is increasing or any other associated symptoms develop including fever or he should go to the emergency room.  Side effects with clindamycin explained to the patient including diarrhea    Orders:  •  clindamycin (CLEOCIN) 300 MG capsule; Take 1 capsule (300 mg total) by mouth 3 (three) times a day for 7 days

## 2025-07-08 NOTE — PROGRESS NOTES
Name: Shay Aguilar      : 1955      MRN: [id number]  Encounter Provider: Oumou Burch MD  Encounter Date: 2025   Encounter department: [facility]  :  Assessment & Plan  Cellulitis of left lower extremity  As mentioned in the history of present illness patient developed redness left leg medial aspect not sure if there is an insect bite.  Will start the patient on clindamycin patient allergic to penicillin.  Patient should report in case this redness is increasing or any other associated symptoms develop including fever or he should go to the emergency room.  Side effects with clindamycin explained to the patient including diarrhea    Orders:  •  clindamycin (CLEOCIN) 300 MG capsule; Take 1 capsule (300 mg total) by mouth 3 (three) times a day for 7 days    Calcific tendinitis of right elbow  Slow improvement noted              History of Present Illness   Patient is coming here for evaluation regarding redness he has noted on the medial aspect of the left leg and around the knee area started a couple of days ago he was wearing tight pants stockings when he was working outside does not recall any insect bite in that area.  Patient also felt slightly feverish last night but not today.  No itching in the beginning he has felt slightly but not now.    Patient is allergic to penicillin.    Rash  This is a new problem. The current episode started in the past 7 days. The problem has been gradually worsening since onset. The affected locations include the left upper leg. The problem is moderate. The rash is characterized by redness. It is unknown if there was an exposure to a precipitant. Pertinent negatives include no cough, fever, shortness of breath, sore throat or vomiting. Past treatments include nothing.     Review of Systems   Constitutional:  Negative for chills and fever.   HENT:  Negative for ear pain and sore throat.    Eyes:  Negative for pain and visual disturbance.  "  Respiratory:  Negative for cough and shortness of breath.    Cardiovascular:  Negative for chest pain and palpitations.   Gastrointestinal:  Negative for abdominal pain and vomiting.   Genitourinary:  Negative for dysuria and hematuria.   Musculoskeletal:  Negative for arthralgias and back pain.   Skin:  Positive for rash. Negative for color change.   Neurological:  Negative for seizures and syncope.   All other systems reviewed and are negative.      Objective   /76 (BP Location: Right arm, Patient Position: Sitting, Cuff Size: Standard)   Pulse 77   Ht 5' 11\" (1.803 m)   Wt 89.2 kg (196 lb 11.2 oz)   SpO2 97%   BMI 27.43 kg/m²      Physical Exam  Vitals and nursing note reviewed.   Constitutional:       General: He is not in acute distress.     Appearance: He is well-developed.   HENT:      Head: Normocephalic and atraumatic.     Eyes:      Conjunctiva/sclera: Conjunctivae normal.       Cardiovascular:      Rate and Rhythm: Normal rate and regular rhythm.      Heart sounds: No murmur heard.  Pulmonary:      Effort: Pulmonary effort is normal. No respiratory distress.      Breath sounds: Normal breath sounds.   Abdominal:      Palpations: Abdomen is soft.      Tenderness: There is no abdominal tenderness.     Musculoskeletal:         General: No swelling.      Cervical back: Neck supple.     Skin:     General: Skin is warm and dry.      Capillary Refill: Capillary refill takes less than 2 seconds.      Comments: Redness noted on the medial aspect of the left leg extending into the knee area warm to touch     Neurological:      Mental Status: He is alert.     Psychiatric:         Mood and Affect: Mood normal.         "

## 2025-08-21 DIAGNOSIS — K21.9 GASTROESOPHAGEAL REFLUX DISEASE WITHOUT ESOPHAGITIS: ICD-10-CM

## 2025-08-22 RX ORDER — FAMOTIDINE 40 MG/1
40 TABLET, FILM COATED ORAL DAILY
Qty: 90 TABLET | Refills: 1 | Status: SHIPPED | OUTPATIENT
Start: 2025-08-22